# Patient Record
Sex: FEMALE | Race: WHITE | HISPANIC OR LATINO | Employment: UNEMPLOYED | URBAN - METROPOLITAN AREA
[De-identification: names, ages, dates, MRNs, and addresses within clinical notes are randomized per-mention and may not be internally consistent; named-entity substitution may affect disease eponyms.]

---

## 2019-07-17 ENCOUNTER — OFFICE VISIT (OUTPATIENT)
Dept: OBSTETRICS AND GYNECOLOGY | Facility: CLINIC | Age: 19
End: 2019-07-17

## 2019-07-17 VITALS
HEIGHT: 61 IN | WEIGHT: 121.7 LBS | BODY MASS INDEX: 22.98 KG/M2 | SYSTOLIC BLOOD PRESSURE: 100 MMHG | DIASTOLIC BLOOD PRESSURE: 74 MMHG

## 2019-07-17 DIAGNOSIS — Z98.891 PREVIOUS CESAREAN SECTION: ICD-10-CM

## 2019-07-17 DIAGNOSIS — N91.2 ABSENCE OF MENSTRUATION: Primary | ICD-10-CM

## 2019-07-17 DIAGNOSIS — Z78.9 USES SPANISH AS PRIMARY SPOKEN LANGUAGE: ICD-10-CM

## 2019-07-17 LAB
B-HCG UR QL: POSITIVE
INTERNAL NEGATIVE CONTROL: NEGATIVE
INTERNAL POSITIVE CONTROL: POSITIVE
Lab: ABNORMAL

## 2019-07-17 PROCEDURE — 99204 OFFICE O/P NEW MOD 45 MIN: CPT | Performed by: OBSTETRICS & GYNECOLOGY

## 2019-07-17 PROCEDURE — 81025 URINE PREGNANCY TEST: CPT | Performed by: OBSTETRICS & GYNECOLOGY

## 2019-07-17 RX ORDER — CEPHALEXIN 500 MG/1
CAPSULE ORAL
Refills: 0 | COMMUNITY
Start: 2019-07-04 | End: 2019-08-28

## 2019-07-17 RX ORDER — ONDANSETRON 4 MG/1
4 TABLET, FILM COATED ORAL DAILY PRN
Qty: 30 TABLET | Refills: 1 | Status: SHIPPED | OUTPATIENT
Start: 2019-07-17 | End: 2020-02-07 | Stop reason: HOSPADM

## 2019-07-17 NOTE — PROGRESS NOTES
OB CONFIRMATION APPOINTMENT    CC- Pt has had a menstrual irregularity    Chief Complaint   Patient presents with   • Amenorrhea        HISTORY OF PRESENT ILLNESS:    Amenorrhea   This is a new problem. The current episode started more than 1 month ago. The problem occurs constantly. The problem has been unchanged. Associated symptoms include nausea. Pertinent negatives include no abdominal pain or fever. Nothing aggravates the symptoms. She has tried nothing for the symptoms.       Angela Clemente is being seen today to confirm she is pregnant.    She is a 19 y.o.  Patient's last menstrual period was 2019 (exact date)..  EDC= 20.  Gestational age is 11wks 1d     Current obstetric complaints : none     Prior obstetric issues, potential pregnancy concerns: previous IOL @ 33 weeks for severe preeclampsia.    Family history of genetic issues (includes FOB): none    OFFERED GENETIC TESTING:   CfDNA: yes  Cystic fibrosis: yes  Spinal muscular atrophy: yes  Fragile X syndrome: yes    Prior infections concerning in pregnancy (Rash, fever in last 2 weeks): none    Varicella or vaccine Hx -unknown    Prior testing for Cystic Fibrosis Carrier or Sickle Cell Trait- unknown    HISTORY REVIEWED:      History reviewed. No pertinent past medical history.    History reviewed. No pertinent surgical history.      Current Outpatient Medications:   •  cephalexin (KEFLEX) 500 MG capsule, TAKE ONE CAPSULE BY MOUTH EVERY 12 HOURS FOR 7 DAYS, Disp: , Rfl: 0  •  ondansetron (ZOFRAN) 4 MG tablet, Take 1 tablet by mouth Daily As Needed for Nausea or Vomiting., Disp: 30 tablet, Rfl: 1    No Known Allergies    Social History     Socioeconomic History   • Marital status:      Spouse name: Not on file   • Number of children: Not on file   • Years of education: Not on file   • Highest education level: Not on file       History reviewed. No pertinent family history.    REVIEW OF SYSTEMS:     Review of Systems   Constitutional:  "Negative.  Negative for fever.   HENT: Negative.    Eyes: Negative.    Respiratory: Negative.    Cardiovascular: Negative.    Gastrointestinal: Positive for nausea. Negative for abdominal pain.   Endocrine: Negative.    Genitourinary: Positive for menstrual problem.   Musculoskeletal: Negative.    Skin: Negative.    Allergic/Immunologic: Negative.    Neurological: Negative.    Hematological: Negative.    Psychiatric/Behavioral: Negative.        Physical Exam     Physical Exam   Constitutional: She is oriented to person, place, and time. She appears well-developed and well-nourished.   HENT:   Head: Normocephalic and atraumatic.   Eyes: EOM are normal.   Neck: Normal range of motion.   Pulmonary/Chest: Effort normal.   Abdominal: Soft. Bowel sounds are normal. She exhibits no distension and no mass. There is no tenderness. There is no rebound and no guarding.   Musculoskeletal: Normal range of motion.   Neurological: She is alert and oriented to person, place, and time.   Skin: Skin is warm and dry.   Psychiatric: She has a normal mood and affect. Her behavior is normal. Judgment and thought content normal.   Nursing note and vitals reviewed.          Objective    /74   Ht 154.9 cm (61\")   Wt 55.2 kg (121 lb 11.2 oz)   LMP 2019 (Exact Date)   Breastfeeding? No   BMI 23.00 kg/m²     Counseling given: Not Answered          Assessment    1) Pregnancy at Unknown   Angela was seen today for amenorrhea.    Diagnoses and all orders for this visit:    Absence of menstruation  -     POC Pregnancy, Urine    Uses Togolese as primary spoken language    Previous  section:  induction of labor for preeclampsia    Other orders  -     ondansetron (ZOFRAN) 4 MG tablet; Take 1 tablet by mouth Daily As Needed for Nausea or Vomiting.         Plan    Patient is on Prenatal vitamins  Problem list reviewed and updated.  Reviewed routine prenatal care with the patient  Dave (travel restrictions/ok to use insect " repellant), not to changing cat litter, food restrictions, avoidance of alcohol, tobacco and drugs and saunas/hot tubs.   All questions answered.     Counseling was given to patient and family for the following topics: diagnostic results, instructions for management, risk factor reductions, prognosis, patient and family education, impressions, risks and benefits of treatment options, importance of treatment compliance and previous C/S  . Total time of the encounter was 45 minutes face to face and 30 minutes was spent counseling.      RTO Return in about 2 weeks (around 7/31/2019) for ob phys w/ pap.    Tomi Cooney MD    7/17/2019  9:58 AM

## 2019-07-31 ENCOUNTER — RESULTS ENCOUNTER (OUTPATIENT)
Dept: OBSTETRICS AND GYNECOLOGY | Facility: CLINIC | Age: 19
End: 2019-07-31

## 2019-07-31 ENCOUNTER — PROCEDURE VISIT (OUTPATIENT)
Dept: OBSTETRICS AND GYNECOLOGY | Facility: CLINIC | Age: 19
End: 2019-07-31

## 2019-07-31 ENCOUNTER — INITIAL PRENATAL (OUTPATIENT)
Dept: OBSTETRICS AND GYNECOLOGY | Facility: CLINIC | Age: 19
End: 2019-07-31

## 2019-07-31 VITALS — WEIGHT: 122 LBS | DIASTOLIC BLOOD PRESSURE: 76 MMHG | SYSTOLIC BLOOD PRESSURE: 120 MMHG | BODY MASS INDEX: 23.05 KG/M2

## 2019-07-31 DIAGNOSIS — Z98.891 PREVIOUS CESAREAN SECTION: ICD-10-CM

## 2019-07-31 DIAGNOSIS — Z36.87 UNSURE OF LMP (LAST MENSTRUAL PERIOD) AS REASON FOR ULTRASOUND SCAN: Primary | ICD-10-CM

## 2019-07-31 DIAGNOSIS — Z36.9 ENCOUNTER FOR ANTENATAL SCREENING, UNSPECIFIED: ICD-10-CM

## 2019-07-31 DIAGNOSIS — O09.32 INITIAL OBSTETRIC VISIT IN SECOND TRIMESTER: Primary | ICD-10-CM

## 2019-07-31 DIAGNOSIS — Z78.9 USES SPANISH AS PRIMARY SPOKEN LANGUAGE: ICD-10-CM

## 2019-07-31 LAB
C TRACH RRNA SPEC DONR QL NAA+PROBE: NEGATIVE
EXTERNAL AMPHETAMINE SCREEN URINE: NEGATIVE
EXTERNAL CYSTIC FIBROSIS: NEGATIVE
N GONORRHOEA DNA SPEC QL NAA+PROBE: NEGATIVE

## 2019-07-31 PROCEDURE — 76801 OB US < 14 WKS SINGLE FETUS: CPT | Performed by: NURSE PRACTITIONER

## 2019-07-31 PROCEDURE — 99214 OFFICE O/P EST MOD 30 MIN: CPT | Performed by: NURSE PRACTITIONER

## 2019-08-05 LAB
BACTERIA UR CULT: NO GROWTH
BACTERIA UR CULT: NORMAL
DRUGS UR: NORMAL

## 2019-08-06 PROBLEM — R87.610 ASCUS WITH POSITIVE HIGH RISK HPV CERVICAL: Status: ACTIVE | Noted: 2019-08-06

## 2019-08-06 PROBLEM — R87.810 ASCUS WITH POSITIVE HIGH RISK HPV CERVICAL: Status: ACTIVE | Noted: 2019-08-06

## 2019-08-06 LAB
C TRACH RRNA CVX QL NAA+PROBE: NEGATIVE
CONV .: ABNORMAL
CYTOLOGIST CVX/VAG CYTO: ABNORMAL
CYTOLOGY CVX/VAG DOC CYTO: ABNORMAL
CYTOLOGY CVX/VAG DOC THIN PREP: ABNORMAL
DX ICD CODE: ABNORMAL
DX ICD CODE: ABNORMAL
HIV 1 & 2 AB SER-IMP: ABNORMAL
HPV I/H RISK 1 DNA CVX QL PROBE+SIG AMP: POSITIVE
N GONORRHOEA RRNA CVX QL NAA+PROBE: NEGATIVE
OTHER STN SPEC: ABNORMAL
PATHOLOGIST CVX/VAG CYTO: ABNORMAL
STAT OF ADQ CVX/VAG CYTO-IMP: ABNORMAL
T VAGINALIS RRNA SPEC QL NAA+PROBE: NEGATIVE

## 2019-08-07 LAB
ABO GROUP BLD: (no result)
BASOPHILS # BLD AUTO: 0 X10E3/UL (ref 0–0.2)
BASOPHILS NFR BLD AUTO: 0 %
BLD GP AB SCN SERPL QL: NEGATIVE
CYSTIC FIBROSIS MUTATION 97: NORMAL
EOSINOPHIL # BLD AUTO: 0.1 X10E3/UL (ref 0–0.4)
EOSINOPHIL NFR BLD AUTO: 1 %
ERYTHROCYTE [DISTWIDTH] IN BLOOD BY AUTOMATED COUNT: 13.6 % (ref 12.3–15.4)
GENE DIS ANL CARRIER INTERP-IMP: NORMAL
HBA1C MFR BLD: 4.6 % (ref 4.8–5.6)
HBV SURFACE AG SERPL QL IA: NEGATIVE
HCT VFR BLD AUTO: 38.9 % (ref 34–46.6)
HCV AB S/CO SERPL IA: <0.1 S/CO RATIO (ref 0–0.9)
HGB A MFR BLD: 97.8 % (ref 96.4–98.8)
HGB A2 MFR BLD COLUMN CHROM: 2.2 % (ref 1.8–3.2)
HGB BLD-MCNC: 13.5 G/DL (ref 11.1–15.9)
HGB C MFR BLD: 0 %
HGB F MFR BLD: 0 % (ref 0–2)
HGB FRACT BLD-IMP: NORMAL
HGB S BLD QL SOLY: NEGATIVE
HGB S MFR BLD: 0 %
HIV 1+2 AB+HIV1 P24 AG SERPL QL IA: NON REACTIVE
IMM GRANULOCYTES # BLD AUTO: 0 X10E3/UL (ref 0–0.1)
IMM GRANULOCYTES NFR BLD AUTO: 0 %
LYMPHOCYTES # BLD AUTO: 1.5 X10E3/UL (ref 0.7–3.1)
LYMPHOCYTES NFR BLD AUTO: 21 %
MCH RBC QN AUTO: 30.7 PG (ref 26.6–33)
MCHC RBC AUTO-ENTMCNC: 34.7 G/DL (ref 31.5–35.7)
MCV RBC AUTO: 88 FL (ref 79–97)
MONOCYTES # BLD AUTO: 0.5 X10E3/UL (ref 0.1–0.9)
MONOCYTES NFR BLD AUTO: 7 %
NEUTROPHILS # BLD AUTO: 4.9 X10E3/UL (ref 1.4–7)
NEUTROPHILS NFR BLD AUTO: 71 %
PLATELET # BLD AUTO: 254 X10E3/UL (ref 150–450)
RBC # BLD AUTO: 4.4 X10E6/UL (ref 3.77–5.28)
RH BLD: POSITIVE
RPR SER QL: NON REACTIVE
RUBV IGG SERPL IA-ACNC: 1.85 INDEX
WBC # BLD AUTO: 7 X10E3/UL (ref 3.4–10.8)

## 2019-08-28 ENCOUNTER — ROUTINE PRENATAL (OUTPATIENT)
Dept: OBSTETRICS AND GYNECOLOGY | Facility: CLINIC | Age: 19
End: 2019-08-28

## 2019-08-28 VITALS — BODY MASS INDEX: 23.24 KG/M2 | DIASTOLIC BLOOD PRESSURE: 70 MMHG | SYSTOLIC BLOOD PRESSURE: 102 MMHG | WEIGHT: 123 LBS

## 2019-08-28 DIAGNOSIS — Z98.891 PREVIOUS CESAREAN SECTION: Primary | ICD-10-CM

## 2019-08-28 DIAGNOSIS — K21.9 GASTROESOPHAGEAL REFLUX DISEASE WITHOUT ESOPHAGITIS: ICD-10-CM

## 2019-08-28 DIAGNOSIS — Z78.9 USES SPANISH AS PRIMARY SPOKEN LANGUAGE: ICD-10-CM

## 2019-08-28 PROCEDURE — 99213 OFFICE O/P EST LOW 20 MIN: CPT | Performed by: OBSTETRICS & GYNECOLOGY

## 2019-08-28 RX ORDER — RANITIDINE 150 MG/1
150 CAPSULE ORAL 2 TIMES DAILY
Qty: 60 CAPSULE | Refills: 3 | Status: SHIPPED | OUTPATIENT
Start: 2019-08-28 | End: 2019-11-11

## 2019-08-28 NOTE — PROGRESS NOTES
OB follow up     Angela Clemente is a 19 y.o.  17w1d being seen today for her obstetrical visit.  Patient reports no bleeding, no contractions and no leaking. Fetal movement: normal.  Patient complains of new onset GERD.  Occurs daily.    Review of Systems  No bleeding, No cramping/contractions     /70   Wt 55.8 kg (123 lb)   LMP 2019 (Exact Date)   BMI 23.24 kg/m²     FHT: present BPM   Uterine Size: 17 cm       Assessment/Plan:    1) 19 y.o.  -pregnancy at 17w1d    2)   Encounter Diagnoses   Name Primary?   • Previous  section:  induction of labor for preeclampsia Yes   • Gastroesophageal reflux disease without esophagitis    • Uses Citizen of the [place] as primary spoken language    New prescription for Zantac sent in.  Plan 3-week ultrasound for anatomy and OB follow-up.    3) Reviewed this stage of pregnancy  4) Problem list updated     Return in about 3 weeks (around 2019) for US, Anatomy, OB Tummy.      George Hanson MD    2019  9:25 AM

## 2019-09-16 ENCOUNTER — ROUTINE PRENATAL (OUTPATIENT)
Dept: OBSTETRICS AND GYNECOLOGY | Facility: CLINIC | Age: 19
End: 2019-09-16

## 2019-09-16 ENCOUNTER — PROCEDURE VISIT (OUTPATIENT)
Dept: OBSTETRICS AND GYNECOLOGY | Facility: CLINIC | Age: 19
End: 2019-09-16

## 2019-09-16 VITALS — SYSTOLIC BLOOD PRESSURE: 102 MMHG | DIASTOLIC BLOOD PRESSURE: 68 MMHG | BODY MASS INDEX: 23.62 KG/M2 | WEIGHT: 125 LBS

## 2019-09-16 DIAGNOSIS — Z36.89 ENCOUNTER FOR FETAL ANATOMIC SURVEY: Primary | ICD-10-CM

## 2019-09-16 DIAGNOSIS — Z23 NEEDS FLU SHOT: ICD-10-CM

## 2019-09-16 DIAGNOSIS — Z34.90 PREGNANCY, UNSPECIFIED GESTATIONAL AGE: ICD-10-CM

## 2019-09-16 DIAGNOSIS — Z98.891 PREVIOUS CESAREAN SECTION: ICD-10-CM

## 2019-09-16 DIAGNOSIS — Z86.69 HISTORY OF MIGRAINE: ICD-10-CM

## 2019-09-16 DIAGNOSIS — Z36.9 ENCOUNTER FOR ANTENATAL SCREENING, UNSPECIFIED: Primary | ICD-10-CM

## 2019-09-16 DIAGNOSIS — O09.219 PREVIOUS PRETERM DELIVERY, ANTEPARTUM: ICD-10-CM

## 2019-09-16 DIAGNOSIS — Z78.9 USES SPANISH AS PRIMARY SPOKEN LANGUAGE: ICD-10-CM

## 2019-09-16 DIAGNOSIS — R87.610 ASCUS WITH POSITIVE HIGH RISK HPV CERVICAL: ICD-10-CM

## 2019-09-16 DIAGNOSIS — R87.810 ASCUS WITH POSITIVE HIGH RISK HPV CERVICAL: ICD-10-CM

## 2019-09-16 DIAGNOSIS — O09.299 HX OF PREECLAMPSIA, PRIOR PREGNANCY, CURRENTLY PREGNANT: ICD-10-CM

## 2019-09-16 PROCEDURE — 76805 OB US >/= 14 WKS SNGL FETUS: CPT | Performed by: OBSTETRICS & GYNECOLOGY

## 2019-09-16 PROCEDURE — 90471 IMMUNIZATION ADMIN: CPT | Performed by: OBSTETRICS & GYNECOLOGY

## 2019-09-16 PROCEDURE — 90686 IIV4 VACC NO PRSV 0.5 ML IM: CPT | Performed by: OBSTETRICS & GYNECOLOGY

## 2019-09-16 PROCEDURE — 99214 OFFICE O/P EST MOD 30 MIN: CPT | Performed by: OBSTETRICS & GYNECOLOGY

## 2019-09-16 NOTE — PROGRESS NOTES
OB follow up     Angela Clemente is a 19 y.o.  19w6d being seen today for her obstetrical visit.  Patient reports headache. Fetal movement: normal. She has a h/o migraines and is taking tylenol. We also discussed magnesium supplements.     Review of Systems  No bleeding, No cramping/contractions     /68   Wt 56.7 kg (125 lb)   LMP 2019 (Exact Date)   BMI 23.62 kg/m²     FHT: 146 BPM   Uterine Size: 20  cm       Assessment/Plan:    1) 19 y.o.  -pregnancy at 19w6d- normal anatomy scan within the limits of US. Anterior placenta. Good interval growth since US on 7/3/19.    2) NIPT was not done. Check Quad screen.     3) H/O severe preeclampsia at 33.4 weeks- 78 page medical records reviewed. Check CMP, CBC and baseline 24 hour urine. Not taking ASA 81mg, importance of use to prevent preeclampsia d/w pt and Rx called in.     4) Previous C/S at 33.4 weeks- IOL for severe preeclampsia and failure to progress. Plan RLTCS at 39 weeks. Last delivery was 2018.     5) ASCUS + HPV pap- pt is 18 yo, repeat pap pp    6)Patient advised to have flu vaccination to help prevent serious flu related complications for her and her unborn child.  The importance of antibodies for  immunity also discussed with patient and she does agree to vaccination today. All household contacts were advised to be vaccinated if they are of age. The patient was also encouraged to call for Tamiflu for positive exposures.     7)HA- nl BP. D/w pt reasons to come in for HA ( visual changes, prolonged HA, weakness, etc). Try Mag supplements.     8)Problem list updated     9) RTO 4 weeks OBT.       Enriqueta Ybarra MD    2019  9:19 AM

## 2019-09-17 ENCOUNTER — LAB (OUTPATIENT)
Dept: OBSTETRICS AND GYNECOLOGY | Facility: CLINIC | Age: 19
End: 2019-09-17

## 2019-09-17 DIAGNOSIS — O09.299 HX OF PREECLAMPSIA, PRIOR PREGNANCY, CURRENTLY PREGNANT: Primary | ICD-10-CM

## 2019-09-17 LAB
PROT 24H UR-MRATE: 156 MG/24HOURS (ref 0–150)
PROT UR-MCNC: 13 MG/DL

## 2019-09-18 LAB
2ND TRIMESTER 4 SCREEN SERPL-IMP: NORMAL
2ND TRIMESTER 4 SCREEN SERPL-IMP: NORMAL
AFP ADJ MOM SERPL: 1.11
AFP SERPL-MCNC: 68.6 NG/ML
AGE AT DELIVERY: 21.6 YR
ALBUMIN SERPL-MCNC: 4.1 G/DL (ref 3.5–5.2)
ALBUMIN/GLOB SERPL: 1.7 G/DL
ALP SERPL-CCNC: 72 U/L (ref 39–117)
ALT SERPL-CCNC: 9 U/L (ref 1–33)
AST SERPL-CCNC: 9 U/L (ref 1–32)
BASOPHILS # BLD AUTO: 0.02 10*3/MM3 (ref 0–0.2)
BASOPHILS NFR BLD AUTO: 0.3 % (ref 0–1.5)
BILIRUB SERPL-MCNC: 0.2 MG/DL (ref 0.2–1.2)
BUN SERPL-MCNC: 7 MG/DL (ref 6–20)
BUN/CREAT SERPL: 17.5 (ref 7–25)
CALCIUM SERPL-MCNC: 11.3 MG/DL (ref 8.6–10.5)
CHLORIDE SERPL-SCNC: 102 MMOL/L (ref 98–107)
CO2 SERPL-SCNC: 25.7 MMOL/L (ref 22–29)
CREAT SERPL-MCNC: 0.4 MG/DL (ref 0.57–1)
EOSINOPHIL # BLD AUTO: 0.06 10*3/MM3 (ref 0–0.4)
EOSINOPHIL NFR BLD AUTO: 1 % (ref 0.3–6.2)
ERYTHROCYTE [DISTWIDTH] IN BLOOD BY AUTOMATED COUNT: 12.9 % (ref 12.3–15.4)
FET TS 18 RISK FROM MAT AGE: NORMAL
FET TS 21 RISK FROM MAT AGE: 1137
GA METHOD: NORMAL
GA: 19.9 WEEKS
GLOBULIN SER CALC-MCNC: 2.4 GM/DL
GLUCOSE SERPL-MCNC: 76 MG/DL (ref 65–99)
HCG ADJ MOM SERPL: 1.52
HCG SERPL-ACNC: NORMAL MIU/ML
HCT VFR BLD AUTO: 40.3 % (ref 34–46.6)
HGB BLD-MCNC: 13.3 G/DL (ref 12–15.9)
IDDM PATIENT QL: NO
IMM GRANULOCYTES # BLD AUTO: 0.02 10*3/MM3 (ref 0–0.05)
IMM GRANULOCYTES NFR BLD AUTO: 0.3 % (ref 0–0.5)
INHIBIN A ADJ MOM SERPL: 0.98
INHIBIN A SERPL-MCNC: 219.65 PG/ML
LABORATORY COMMENT REPORT: NORMAL
LYMPHOCYTES # BLD AUTO: 1.27 10*3/MM3 (ref 0.7–3.1)
LYMPHOCYTES NFR BLD AUTO: 22.1 % (ref 19.6–45.3)
MCH RBC QN AUTO: 30.6 PG (ref 26.6–33)
MCHC RBC AUTO-ENTMCNC: 33 G/DL (ref 31.5–35.7)
MCV RBC AUTO: 92.9 FL (ref 79–97)
MONOCYTES # BLD AUTO: 0.4 10*3/MM3 (ref 0.1–0.9)
MONOCYTES NFR BLD AUTO: 7 % (ref 5–12)
MULTIPLE PREGNANCY: NO
NEURAL TUBE DEFECT RISK FETUS: 8647 %
NEUTROPHILS # BLD AUTO: 3.98 10*3/MM3 (ref 1.7–7)
NEUTROPHILS NFR BLD AUTO: 69.3 % (ref 42.7–76)
NRBC BLD AUTO-RTO: 0 /100 WBC (ref 0–0.2)
PLATELET # BLD AUTO: 223 10*3/MM3 (ref 140–450)
POTASSIUM SERPL-SCNC: 4.9 MMOL/L (ref 3.5–5.2)
PROT SERPL-MCNC: 6.5 G/DL (ref 6–8.5)
RBC # BLD AUTO: 4.34 10*6/MM3 (ref 3.77–5.28)
RESULT: NORMAL
SODIUM SERPL-SCNC: 138 MMOL/L (ref 136–145)
TS 18 RISK FETUS: NORMAL
TS 21 RISK FETUS: NORMAL
U ESTRIOL ADJ MOM SERPL: 1.36
U ESTRIOL SERPL-MCNC: 2.8 NG/ML
WBC # BLD AUTO: 5.75 10*3/MM3 (ref 3.4–10.8)

## 2019-09-18 NOTE — PROGRESS NOTES
PIP= baseline 24 hour urine is normal at 156 mg. Can we please ask the lab to list the total volume of each 24 hour urine so we know if it is an adequate specimen? thanks

## 2019-10-14 ENCOUNTER — ROUTINE PRENATAL (OUTPATIENT)
Dept: OBSTETRICS AND GYNECOLOGY | Facility: CLINIC | Age: 19
End: 2019-10-14

## 2019-10-14 VITALS — BODY MASS INDEX: 25.13 KG/M2 | WEIGHT: 133 LBS | DIASTOLIC BLOOD PRESSURE: 60 MMHG | SYSTOLIC BLOOD PRESSURE: 104 MMHG

## 2019-10-14 DIAGNOSIS — O23.42 URINARY TRACT INFECTION IN MOTHER DURING SECOND TRIMESTER OF PREGNANCY: ICD-10-CM

## 2019-10-14 DIAGNOSIS — O23.40 URINARY TRACT INFECTION IN MOTHER DURING PREGNANCY, ANTEPARTUM: Primary | ICD-10-CM

## 2019-10-14 PROBLEM — O09.32 INITIAL OBSTETRIC VISIT IN SECOND TRIMESTER: Status: RESOLVED | Noted: 2019-07-31 | Resolved: 2019-10-14

## 2019-10-14 PROBLEM — Z98.891 PREVIOUS CESAREAN SECTION: Status: RESOLVED | Noted: 2019-07-17 | Resolved: 2019-10-14

## 2019-10-14 PROCEDURE — 99214 OFFICE O/P EST MOD 30 MIN: CPT | Performed by: OBSTETRICS & GYNECOLOGY

## 2019-10-14 RX ORDER — NITROFURANTOIN 25; 75 MG/1; MG/1
100 CAPSULE ORAL 2 TIMES DAILY
Qty: 14 CAPSULE | Refills: 0 | Status: SHIPPED | OUTPATIENT
Start: 2019-10-14 | End: 2019-11-11

## 2019-10-14 RX ORDER — GUAIFENESIN, PSEUDOEPHEDRINE HYDROCHLORIDE 600; 60 MG/1; MG/1
1 TABLET, EXTENDED RELEASE ORAL EVERY 12 HOURS
Qty: 14 TABLET | Refills: 0 | Status: SHIPPED | OUTPATIENT
Start: 2019-10-14 | End: 2020-02-07 | Stop reason: HOSPADM

## 2019-10-14 NOTE — PROGRESS NOTES
OB follow up     Chief Complaint: PNC FU    Angela Clemente is a 19 y.o.  23w6d being seen today for her obstetrical visit.  Patient reports cough, HA, congestion.. Fetal movement: normal. This is my first time seeing this pt so all problems are new to me. Pt reports she has started her ASA daily. She has turned in her 24hr urine for baseline. Pt has nirtites on UA. Reports frequency. No dysuria.    Review of Systems  No bleeding, No cramping/contractions     /60   Wt 60.3 kg (133 lb)   LMP 2019 (Exact Date)   BMI 25.13 kg/m²     FHT: present   Uterine Size: 24cm       Assessment/Plan: Low risk pregnancy    1) pregnancy at 23w6d: 2hr GTT next visit    2) Quad screen neg     3) H/O severe preeclampsia at 33.4 weeks. Baseline 24hr urine 156mg protein. Pt is taking ASA daily     4) Previous C/S at 33.4 weeks- IOL for severe preeclampsia and failure to progress. Plan RLTCS at 39 weeks. Last delivery was 2018.      5) ASCUS + HPV pap- pt is 18 yo, repeat pap pp     6) s/p flu vaccine. Needs tdap next vsit    7) Congestion, cough, HA: Rx Mucinex D    8) UTI: Rx Macrobid. Check UCx.            Reviewed this stage of pregnancy  Problem list updated   No Follow-up on file.      Riana Murray DO    10/14/2019  9:10 AM

## 2019-10-16 LAB
BACTERIA UR CULT: NORMAL
BACTERIA UR CULT: NORMAL

## 2019-11-11 ENCOUNTER — ROUTINE PRENATAL (OUTPATIENT)
Dept: OBSTETRICS AND GYNECOLOGY | Facility: CLINIC | Age: 19
End: 2019-11-11

## 2019-11-11 VITALS — WEIGHT: 137 LBS | BODY MASS INDEX: 25.89 KG/M2 | SYSTOLIC BLOOD PRESSURE: 102 MMHG | DIASTOLIC BLOOD PRESSURE: 60 MMHG

## 2019-11-11 DIAGNOSIS — Z3A.27 27 WEEKS GESTATION OF PREGNANCY: Primary | ICD-10-CM

## 2019-11-11 DIAGNOSIS — Z36.9 ENCOUNTER FOR ANTENATAL SCREENING, UNSPECIFIED: ICD-10-CM

## 2019-11-11 DIAGNOSIS — O09.219 PREVIOUS PRETERM DELIVERY, ANTEPARTUM: ICD-10-CM

## 2019-11-11 DIAGNOSIS — Z98.891 HISTORY OF CESAREAN SECTION: ICD-10-CM

## 2019-11-11 LAB
GLUCOSE 1H P 75 G GLC PO SERPL-MCNC: 86 MG/DL (ref 65–179)
GLUCOSE 2H P 75 G GLC PO SERPL-MCNC: 75 MG/DL (ref 65–154)
GLUCOSE P FAST SERPL-MCNC: 64 MG/DL (ref 65–94)
HCT VFR BLD AUTO: 31.6 % (ref 34–46.6)
HGB BLD-MCNC: 10.7 G/DL (ref 12–15.9)

## 2019-11-11 PROCEDURE — 90715 TDAP VACCINE 7 YRS/> IM: CPT | Performed by: OBSTETRICS & GYNECOLOGY

## 2019-11-11 PROCEDURE — 99214 OFFICE O/P EST MOD 30 MIN: CPT | Performed by: OBSTETRICS & GYNECOLOGY

## 2019-11-11 PROCEDURE — 90471 IMMUNIZATION ADMIN: CPT | Performed by: OBSTETRICS & GYNECOLOGY

## 2019-11-11 NOTE — PROGRESS NOTES
OB follow up     Chief Complaint: C FU    Angela Clemente is a 19 y.o.  27w6d being seen today for her obstetrical visit.  Patient reports no complaints. Fetal movement: normal.  This is the first time I am seeing this patient in this pregnancy.  Patient reporting today that she may want to consider doing a .  She had a history of a prior  section at 34 weeks for severe preeclampsia.  No operative note is seen in media.  Her last  section was in 2018.  Patient has a keloid from her prior  section.    Review of Systems  No bleeding, No cramping/contractions     /60   Wt 62.1 kg (137 lb)   LMP 2019 (Exact Date)   BMI 25.89 kg/m²     FHT: present   Uterine Size: 30cm       Assessment/Plan: Low risk pregnancy    1) pregnancy at 27w6d: 2hr GTT today. Rh positive    2) Quad screen neg     3) H/O severe preeclampsia at 33.4 weeks. Baseline 24hr urine 156mg protein. Pt is taking ASA daily     4) Previous C/S at 33.4 weeks- IOL for severe preeclampsia and failure to progress. Plan RLTCS at 39 weeks. Last delivery was 2018. Pt asking today about possible .  Records in epic reviewed but no operative report seen.  Unsure if patient is even a candidate for .  Will request operative report and discussed with the patient at the next visit.  Patient has a keloid from her prior  section is apprehensive about getting another  section due to the keloid.     5) ASCUS + HPV pap- pt is 20 yo, repeat pap pp      6) s/p flu vaccine. tDap today.    7) Size greater than dates: check US at next visit.                Reviewed this stage of pregnancy  Problem list updated   No Follow-up on file.      Riana Murray DO    2019  9:17 AM

## 2019-11-13 ENCOUNTER — TELEPHONE (OUTPATIENT)
Dept: OBSTETRICS AND GYNECOLOGY | Facility: CLINIC | Age: 19
End: 2019-11-13

## 2019-11-13 PROBLEM — O99.019 ANTEPARTUM ANEMIA: Status: ACTIVE | Noted: 2019-11-13

## 2019-11-13 RX ORDER — FERROUS SULFATE 325(65) MG
325 TABLET ORAL
Qty: 90 TABLET | Refills: 1 | Status: SHIPPED | OUTPATIENT
Start: 2019-11-13 | End: 2019-12-27 | Stop reason: SDUPTHER

## 2019-11-13 NOTE — TELEPHONE ENCOUNTER
Received a fax from pharmacy patients ranitidine is on back order. Can you please send something else to pharmacy

## 2019-11-25 ENCOUNTER — PROCEDURE VISIT (OUTPATIENT)
Dept: OBSTETRICS AND GYNECOLOGY | Facility: CLINIC | Age: 19
End: 2019-11-25

## 2019-11-25 ENCOUNTER — ROUTINE PRENATAL (OUTPATIENT)
Dept: OBSTETRICS AND GYNECOLOGY | Facility: CLINIC | Age: 19
End: 2019-11-25

## 2019-11-25 VITALS — SYSTOLIC BLOOD PRESSURE: 104 MMHG | BODY MASS INDEX: 26.06 KG/M2 | DIASTOLIC BLOOD PRESSURE: 64 MMHG | WEIGHT: 137.9 LBS

## 2019-11-25 DIAGNOSIS — Z3A.30 30 WEEKS GESTATION OF PREGNANCY: Primary | ICD-10-CM

## 2019-11-25 DIAGNOSIS — O09.299 HX OF PREECLAMPSIA, PRIOR PREGNANCY, CURRENTLY PREGNANT: ICD-10-CM

## 2019-11-25 DIAGNOSIS — O26.849 UTERINE SIZE DATE DISCREPANCY PREGNANCY: Primary | ICD-10-CM

## 2019-11-25 DIAGNOSIS — O09.219 PREVIOUS PRETERM DELIVERY, ANTEPARTUM: ICD-10-CM

## 2019-11-25 PROCEDURE — 99213 OFFICE O/P EST LOW 20 MIN: CPT | Performed by: OBSTETRICS & GYNECOLOGY

## 2019-11-25 PROCEDURE — 76816 OB US FOLLOW-UP PER FETUS: CPT | Performed by: OBSTETRICS & GYNECOLOGY

## 2019-11-25 RX ORDER — PNV NO.95/FERROUS FUM/FOLIC AC 28MG-0.8MG
TABLET ORAL
COMMUNITY
End: 2020-02-24

## 2019-11-25 NOTE — PROGRESS NOTES
OB follow up     Chief Complaint: C FU    Angela Clemente is a 19 y.o.  29w6d being seen today for her obstetrical visit.  Patient reports no complaints. Fetal movement: normal. Pt asking again about  today.    Review of Systems  No bleeding, No cramping/contractions     /64   Wt 62.6 kg (137 lb 14.4 oz)   LMP 2019 (Exact Date)   BMI 26.06 kg/m²     FHT: present   Uterine Size:           Assessment/Plan: Low risk pregnancy    1) pregnancy at 29w6d: 2hr GTT normal.    2) Anemia of pregnancy: on iron daily. Check H/H at 36 weeks.    3) ) H/O severe preeclampsia at 33.4 weeks. Baseline 24hr urine 156mg protein. Pt is taking ASA daily     4) Previous C/S at 33.4 weeks- IOL for severe preeclampsia and failure to progress. Plan RLTCS at 39 weeks. Last delivery was 2018. Pt asking about possible .  Records in epic reviewed but no operative report seen.  Unsure if patient is even a candidate for . Requested operative report but have not gotten it yet. Will request operative report again.     5) ASCUS + HPV pap- pt is 18 yo, repeat pap pp      6) s/p flu vaccine. s/p tdap.     7) Size greater than Dates: US today reveals EFW 66% and SILVANO 15cm          Reviewed this stage of pregnancy  Problem list updated   No Follow-up on file.      Riana Murray DO    2019  1:03 PM

## 2019-12-09 ENCOUNTER — HOSPITAL ENCOUNTER (OUTPATIENT)
Facility: HOSPITAL | Age: 19
Discharge: HOME | End: 2019-12-09
Attending: OBSTETRICS & GYNECOLOGY | Admitting: OBSTETRICS & GYNECOLOGY

## 2019-12-09 VITALS
RESPIRATION RATE: 16 BRPM | TEMPERATURE: 98.3 F | HEART RATE: 60 BPM | DIASTOLIC BLOOD PRESSURE: 84 MMHG | SYSTOLIC BLOOD PRESSURE: 126 MMHG

## 2019-12-09 LAB
AMPHET+METHAMPHET UR QL: NEGATIVE
AMPHETAMINES UR QL: NEGATIVE
BARBITURATES UR QL SCN: NEGATIVE
BENZODIAZ UR QL SCN: NEGATIVE
BUPRENORPHINE SERPL-MCNC: NEGATIVE NG/ML
CANNABINOIDS SERPL QL: NEGATIVE
COCAINE UR QL: NEGATIVE
METHADONE UR QL SCN: NEGATIVE
OPIATES UR QL: NEGATIVE
OXYCODONE UR QL SCN: NEGATIVE
PCP UR QL SCN: NEGATIVE
PROPOXYPH UR QL: NEGATIVE
TRICYCLICS UR QL SCN: NEGATIVE

## 2019-12-09 PROCEDURE — G0463 HOSPITAL OUTPT CLINIC VISIT: HCPCS

## 2019-12-09 PROCEDURE — 80307 DRUG TEST PRSMV CHEM ANLYZR: CPT | Performed by: OBSTETRICS & GYNECOLOGY

## 2019-12-09 PROCEDURE — 59025 FETAL NON-STRESS TEST: CPT

## 2019-12-09 PROCEDURE — 59025 FETAL NON-STRESS TEST: CPT | Performed by: OBSTETRICS & GYNECOLOGY

## 2019-12-09 RX ORDER — ONDANSETRON 4 MG/1
8 TABLET, FILM COATED ORAL ONCE
Status: COMPLETED | OUTPATIENT
Start: 2019-12-09 | End: 2019-12-09

## 2019-12-09 RX ORDER — FAMOTIDINE 20 MG/1
TABLET, FILM COATED ORAL
Status: COMPLETED
Start: 2019-12-09 | End: 2019-12-09

## 2019-12-09 RX ORDER — FAMOTIDINE 20 MG/1
20 TABLET, FILM COATED ORAL 2 TIMES DAILY
COMMUNITY
End: 2020-02-07 | Stop reason: HOSPADM

## 2019-12-09 RX ORDER — ONDANSETRON 4 MG/1
TABLET, FILM COATED ORAL
Status: COMPLETED
Start: 2019-12-09 | End: 2019-12-09

## 2019-12-09 RX ORDER — FAMOTIDINE 20 MG/1
20 TABLET, FILM COATED ORAL ONCE
Status: COMPLETED | OUTPATIENT
Start: 2019-12-09 | End: 2019-12-09

## 2019-12-09 RX ADMIN — ONDANSETRON 8 MG: 4 TABLET, FILM COATED ORAL at 20:49

## 2019-12-09 RX ADMIN — ONDANSETRON HYDROCHLORIDE 8 MG: 4 TABLET, FILM COATED ORAL at 20:49

## 2019-12-09 RX ADMIN — FAMOTIDINE 20 MG: 20 TABLET ORAL at 20:49

## 2019-12-09 RX ADMIN — FAMOTIDINE 20 MG: 20 TABLET, FILM COATED ORAL at 20:49

## 2019-12-10 NOTE — DISCHARGE INSTRUCTIONS
Keep follow up appointment with [facility] 12/12. Call MD with concerns regarding self and/or fetus such as pain, contractions, vaginal bleeding, decreased/absent fetal movement, and or leaking/rupture of membranes.

## 2019-12-10 NOTE — NURSING NOTE
[id number] Fco used for discharge instructions. Pt and spouse denies questions/concerns. Left unit in no signs of distress denies pain nausea resolved.

## 2019-12-10 NOTE — NON STRESS TEST
Angela Clemente, a  at 31w6d with an CHEYENNE of 2020, by Last Menstrual Period, was seen at [facility] for a nonstress test.    Chief Complaint   Patient presents with   • Abdominal Pain       Patient Active Problem List   Diagnosis   • Uses Yi as primary spoken language   • ASCUS with positive high risk HPV cervical= Repeat pap in 1 year (Due )   • Gastroesophageal reflux disease without esophagitis   • Hx of preeclampsia, prior pregnancy, currently pregnant, on ASA 81 mg, baseline 24 hour urine= 156mg   • History of migraine   • Pregnancy   • Previous  delivery, antepartum- IOL at 33.4 weeks , severe preeclampsia   • Urinary tract infection in mother during second trimester of pregnancy   • History of  section   • Antepartum anemia       Start Time:   Stop Time:     Interpretation A  Nonstress Test Interpretation A: Reactive (19 : Zaria Kenney RN)

## 2019-12-12 ENCOUNTER — ROUTINE PRENATAL (OUTPATIENT)
Dept: OBSTETRICS AND GYNECOLOGY | Facility: CLINIC | Age: 19
End: 2019-12-12

## 2019-12-12 VITALS — SYSTOLIC BLOOD PRESSURE: 110 MMHG | WEIGHT: 138.2 LBS | BODY MASS INDEX: 26.11 KG/M2 | DIASTOLIC BLOOD PRESSURE: 64 MMHG

## 2019-12-12 DIAGNOSIS — Z3A.32 32 WEEKS GESTATION OF PREGNANCY: Primary | ICD-10-CM

## 2019-12-12 DIAGNOSIS — O09.219 PREVIOUS PRETERM DELIVERY, ANTEPARTUM: ICD-10-CM

## 2019-12-12 DIAGNOSIS — O34.219 VBAC (VAGINAL BIRTH AFTER CESAREAN): ICD-10-CM

## 2019-12-12 LAB
GLUCOSE UR STRIP-MCNC: NEGATIVE MG/DL
PROT UR STRIP-MCNC: NEGATIVE MG/DL

## 2019-12-12 PROCEDURE — 99214 OFFICE O/P EST MOD 30 MIN: CPT | Performed by: OBSTETRICS & GYNECOLOGY

## 2019-12-12 NOTE — PROGRESS NOTES
OB follow up     Chief Complaint: PNC FU    Angela Clemente is a 19 y.o.  32w2d being seen today for her obstetrical visit.  Patient reports That she still desires a .  Patient's operative report has been obtained from her prior delivery in [place].. Fetal movement: normal.    Review of Systems  No bleeding, No cramping/contractions     /64   Wt 62.7 kg (138 lb 3.2 oz)   LMP 2019 (Exact Date)   BMI 26.11 kg/m²     FHT: present   Uterine Size: 33cm       Assessment/Plan: Low risk pregnancy    1) pregnancy at 32w2d: 2hr GTT normal.     2) Anemia of pregnancy: on iron daily. Check H/H at 36 weeks.     3) ) H/O severe preeclampsia at 33.4 weeks. Baseline 24hr urine 156mg protein. Pt is taking ASA daily     4) Previous C/S at 33.4 weeks- IOL for severe preeclampsia. Patient was induced and advanced to 6 cm dilated per the patient.  The operative report was obtained and patient had a low transverse  section.  Per the operative note, the decision was made to proceed with the primary  section due to worsening preeclampsia and remote from delivery.  A  was used to review the risks of a  with the patient.  Risk of uterine rupture was reviewed with the patient.  Patient's last delivery was in 2018.  Patient will be 18 months from her last delivery at the time of the  if she makes it to term.  Reviewed with patient that a  will be determined at the end of her pregnancy based on her cervical dilation.  Patient voiced her understanding about the risks and voiced her flexibility in making the decision of a  versus a repeat  at the end of her pregnancy.    5) ASCUS + HPV pap- pt is 20 yo, repeat pap pp      6) s/p flu vaccine. s/p tdap.             Reviewed this stage of pregnancy  Problem list updated   Return in about 2 weeks (around 2019).      Riana Murray DO    2019  11:07 AM

## 2019-12-18 PROBLEM — O34.219 VBAC (VAGINAL BIRTH AFTER CESAREAN): Status: ACTIVE | Noted: 2019-12-18

## 2019-12-27 ENCOUNTER — ROUTINE PRENATAL (OUTPATIENT)
Dept: OBSTETRICS AND GYNECOLOGY | Facility: CLINIC | Age: 19
End: 2019-12-27

## 2019-12-27 VITALS — SYSTOLIC BLOOD PRESSURE: 110 MMHG | BODY MASS INDEX: 26.96 KG/M2 | WEIGHT: 142.7 LBS | DIASTOLIC BLOOD PRESSURE: 62 MMHG

## 2019-12-27 DIAGNOSIS — Z78.9 USES SPANISH AS PRIMARY SPOKEN LANGUAGE: ICD-10-CM

## 2019-12-27 DIAGNOSIS — Z34.90 PREGNANCY, UNSPECIFIED GESTATIONAL AGE: ICD-10-CM

## 2019-12-27 DIAGNOSIS — Z98.891 HISTORY OF CESAREAN SECTION: ICD-10-CM

## 2019-12-27 DIAGNOSIS — O99.019 ANTEPARTUM ANEMIA: Primary | ICD-10-CM

## 2019-12-27 DIAGNOSIS — O09.299 HX OF PREECLAMPSIA, PRIOR PREGNANCY, CURRENTLY PREGNANT: ICD-10-CM

## 2019-12-27 LAB
GLUCOSE UR STRIP-MCNC: NEGATIVE MG/DL
PROT UR STRIP-MCNC: NEGATIVE MG/DL

## 2019-12-27 PROCEDURE — 99213 OFFICE O/P EST LOW 20 MIN: CPT | Performed by: OBSTETRICS & GYNECOLOGY

## 2019-12-27 RX ORDER — LORATADINE 10 MG
TABLET ORAL
COMMUNITY
Start: 2019-12-07 | End: 2020-02-07 | Stop reason: HOSPADM

## 2019-12-27 RX ORDER — FERROUS SULFATE 325(65) MG
325 TABLET ORAL
Qty: 90 TABLET | Refills: 1 | Status: SHIPPED | OUTPATIENT
Start: 2019-12-27 | End: 2020-02-07 | Stop reason: HOSPADM

## 2019-12-27 NOTE — PROGRESS NOTES
OB follow up     Angela Clemente is a 19 y.o.  34w3d being seen today for her obstetrical visit.  Patient reports no complaints. Fetal movement: normal.    Review of Systems  No bleeding, No cramping/contractions     /62   Wt 64.7 kg (142 lb 11.2 oz)   LMP 2019 (Exact Date)   BMI 26.96 kg/m²     FHT: 145 BPM   Uterine Size: 34  cm       Assessment/Plan:    1) 19 y.o.  -pregnancy at 34w3d    2) Anemia of pregnancy: not taking iron daily. Rx called in. Check H/H in 2 weeks     3) ) H/O severe preeclampsia at 33.4 weeks. Baseline 24hr urine 156mg protein. Pt is taking ASA daily     4) Previous C/S at 33.4 weeks- IOL for severe preeclampsia. Patient was induced and advanced to 6 cm dilated per the patient.  The operative report was obtained and patient had a low transverse  section.  Per the operative note, the decision was made to proceed with the primary  section due to worsening preeclampsia and remote from delivery.  A  was used to review the risks of a  with the patient.  Risk of uterine rupture was reviewed with the patient.  Patient's last delivery was in 2018.  Patient will be 18 months from her last delivery at the time of the  if she makes it to term.  Reviewed with patient that a  will be determined at the end of her pregnancy based on her cervical dilation.  Patient voiced her understanding about the risks and voiced her flexibility in making the decision of a  versus a repeat  at the end of her pregnancy.     5) ASCUS + HPV pap- pt is 18 yo, repeat pap pp      6) s/p flu vaccine. s/p tdap.     7)Reviewed this stage of pregnancy    8)Problem list updated     9) RTO 2 weeks OB int, GBS and H&H. Schedule GB at 37 weeks      Enriqueta Ybarra MD    2019  10:16 AM

## 2020-01-11 ENCOUNTER — HOSPITAL ENCOUNTER (OUTPATIENT)
Facility: HOSPITAL | Age: 20
Discharge: HOME | End: 2020-01-11
Attending: OBSTETRICS & GYNECOLOGY | Admitting: OBSTETRICS & GYNECOLOGY

## 2020-01-11 VITALS
BODY MASS INDEX: 26.81 KG/M2 | WEIGHT: 142 LBS | SYSTOLIC BLOOD PRESSURE: 121 MMHG | DIASTOLIC BLOOD PRESSURE: 83 MMHG | HEIGHT: 61 IN | RESPIRATION RATE: 18 BRPM | TEMPERATURE: 98.8 F | HEART RATE: 81 BPM

## 2020-01-11 LAB
AMPHET+METHAMPHET UR QL: NEGATIVE
AMPHETAMINES UR QL: NEGATIVE
BACTERIA UR QL AUTO: ABNORMAL /HPF
BARBITURATES UR QL SCN: NEGATIVE
BENZODIAZ UR QL SCN: NEGATIVE
BILIRUB UR QL STRIP: NEGATIVE
BUPRENORPHINE SERPL-MCNC: NEGATIVE NG/ML
CANNABINOIDS SERPL QL: NEGATIVE
CLARITY UR: ABNORMAL
COCAINE UR QL: NEGATIVE
COLOR UR: YELLOW
GLUCOSE UR STRIP-MCNC: NEGATIVE MG/DL
HGB UR QL STRIP.AUTO: ABNORMAL
HYALINE CASTS UR QL AUTO: ABNORMAL /LPF
KETONES UR QL STRIP: NEGATIVE
LEUKOCYTE ESTERASE UR QL STRIP.AUTO: ABNORMAL
METHADONE UR QL SCN: NEGATIVE
NITRITE UR QL STRIP: NEGATIVE
OPIATES UR QL: NEGATIVE
OXYCODONE UR QL SCN: NEGATIVE
PCP UR QL SCN: NEGATIVE
PH UR STRIP.AUTO: 7 [PH] (ref 4.5–8)
PROPOXYPH UR QL: NEGATIVE
PROT UR QL STRIP: NEGATIVE
RBC # UR: ABNORMAL /HPF
REF LAB TEST METHOD: ABNORMAL
SP GR UR STRIP: 1.02 (ref 1–1.03)
SQUAMOUS #/AREA URNS HPF: ABNORMAL /HPF
TRICYCLICS UR QL SCN: NEGATIVE
UROBILINOGEN UR QL STRIP: ABNORMAL
WBC UR QL AUTO: ABNORMAL /HPF

## 2020-01-11 PROCEDURE — 80306 DRUG TEST PRSMV INSTRMNT: CPT | Performed by: OBSTETRICS & GYNECOLOGY

## 2020-01-11 PROCEDURE — 59025 FETAL NON-STRESS TEST: CPT

## 2020-01-11 PROCEDURE — 59025 FETAL NON-STRESS TEST: CPT | Performed by: OBSTETRICS & GYNECOLOGY

## 2020-01-11 PROCEDURE — G0463 HOSPITAL OUTPT CLINIC VISIT: HCPCS

## 2020-01-11 PROCEDURE — 81001 URINALYSIS AUTO W/SCOPE: CPT | Performed by: OBSTETRICS & GYNECOLOGY

## 2020-01-12 NOTE — SIGNIFICANT NOTE
Call to Dr Ybarra, informed of pt history  at 36 4/7 weeks gestation with complaints of occasional lower abdominal cramping, informed of history of c/s, pre-eclampsia with previous pregnancy, pt desire to , cervical status, contraction status, fetal heart rate status, reactive nst, pt denies vaginal leaking or bleeding, abdomen soft non tender to palpation, + fetal kicking and moving, orders recd to discharge home, keep scheduled follow up appointment.

## 2020-01-12 NOTE — NON STRESS TEST
Angela Clemente, a  at 36w4d with an CHEYENNE of 2020, by Last Menstrual Period, was seen at [facility] for a nonstress test.    Chief Complaint   Patient presents with   • Abdominal Pain       Patient Active Problem List   Diagnosis   • Uses Frisian as primary spoken language   • ASCUS with positive high risk HPV cervical= Repeat pap in 1 year (Due )   • Gastroesophageal reflux disease without esophagitis   • Hx of preeclampsia, prior pregnancy, currently pregnant, on ASA 81 mg, baseline 24 hour urine= 156mg   • History of migraine   • Pregnancy   • Previous  delivery, antepartum- IOL at 33.4 weeks , severe preeclampsia   • Urinary tract infection in mother during second trimester of pregnancy   • History of  section   • Antepartum anemia   •  (vaginal birth after )       Start Time:    Stop Time:     Interpretation A  Nonstress Test Interpretation A: Reactive (20 : Raya Triplett RN)

## 2020-01-12 NOTE — SIGNIFICANT NOTE
Discharge instructions given to pt via [facility]  Yoana #[id number], pt verbalized understanding of instructions, discharged via ambulation with family at side, condition stable.

## 2020-01-15 ENCOUNTER — ROUTINE PRENATAL (OUTPATIENT)
Dept: OBSTETRICS AND GYNECOLOGY | Facility: CLINIC | Age: 20
End: 2020-01-15

## 2020-01-15 VITALS — SYSTOLIC BLOOD PRESSURE: 120 MMHG | WEIGHT: 147.9 LBS | BODY MASS INDEX: 27.95 KG/M2 | DIASTOLIC BLOOD PRESSURE: 74 MMHG

## 2020-01-15 DIAGNOSIS — Z36.85 ANTENATAL SCREENING FOR STREPTOCOCCUS B: ICD-10-CM

## 2020-01-15 DIAGNOSIS — O99.019 ANTEPARTUM ANEMIA: ICD-10-CM

## 2020-01-15 DIAGNOSIS — O09.219 PREVIOUS PRETERM DELIVERY, ANTEPARTUM: ICD-10-CM

## 2020-01-15 DIAGNOSIS — O09.299 HX OF PREECLAMPSIA, PRIOR PREGNANCY, CURRENTLY PREGNANT: ICD-10-CM

## 2020-01-15 DIAGNOSIS — Z34.93 PRENATAL CARE IN THIRD TRIMESTER: Primary | ICD-10-CM

## 2020-01-15 LAB
GLUCOSE UR STRIP-MCNC: NEGATIVE MG/DL
PROT UR STRIP-MCNC: NEGATIVE MG/DL

## 2020-01-15 PROCEDURE — 99213 OFFICE O/P EST LOW 20 MIN: CPT | Performed by: NURSE PRACTITIONER

## 2020-01-15 NOTE — PROGRESS NOTES
OB follow up > 20 weeks    Chief Complaint   Patient presents with   • Routine Prenatal Visit     pain in lower abd, having bad headaches       Angela Clemente is a 19 y.o.  37w1d being seen today for her obstetrical visit.  Patient reports she is having occas contractions. [facility] she was 1 cm dilated.  Taking prenatal vitamins: Yes      Review of Systems  Constitutional: neg fatigue  Cardiovascular: neg edema  Gastrointestinal: neg n/v  Genitourinary: Negative for vaginal bleeding, or SROM.  + contractions  Fetal movement: normal    /74   Wt 67.1 kg (147 lb 14.4 oz)   LMP 2019 (Exact Date)   BMI 27.95 kg/m²     FHT: present BPM   Uterine Size: size greater than dates       Assessment    1) pregnancy at 37w1d     2) GBS collected today     3) S>D- Check growth US.     4) Anemia- Taking iron. Check CBC today.     5) H/O severe preeclampsia at 33.4 weeks. Baseline 24hr urine 156mg protein. Pt is taking ASA daily    6) Previous C/S at 33.4 weeks- IOL for severe preeclampsia. Patient was induced and advanced to 6 cm dilated per the patient.  The operative report was obtained and patient had a low transverse  section.  Per the operative note, the decision was made to proceed with the primary  section due to worsening preeclampsia and remote from delivery.  A  was used to review the risks of a  with the patient.  Risk of uterine rupture was reviewed with the patient.  Patient's last delivery was in 2018.  Patient will be 18 months from her last delivery at the time of the  if she makes it to term.  Reviewed with patient that a  will be determined at the end of her pregnancy based on her cervical dilation.  Patient voiced her understanding about the risks and voiced her flexibility in making the decision of a  versus a repeat  at the end of her pregnancy.    7) S/P flu and tdap    8) ASCUS + HPV pap- pt  is 18 yo, repeat pap pp          Plan    Continue prenatal vitamins  Reviewed this stage of pregnancy  Problem list updated   Follow up in 1 weeks    María Smith, SP  1/15/2020  4:56 PM

## 2020-01-16 LAB
BASOPHILS # BLD AUTO: 0.02 10*3/MM3 (ref 0–0.2)
BASOPHILS NFR BLD AUTO: 0.3 % (ref 0–1.5)
EOSINOPHIL # BLD AUTO: 0.04 10*3/MM3 (ref 0–0.4)
EOSINOPHIL NFR BLD AUTO: 0.6 % (ref 0.3–6.2)
ERYTHROCYTE [DISTWIDTH] IN BLOOD BY AUTOMATED COUNT: 12.3 % (ref 12.3–15.4)
HCT VFR BLD AUTO: 36 % (ref 34–46.6)
HGB BLD-MCNC: 12.3 G/DL (ref 12–15.9)
IMM GRANULOCYTES # BLD AUTO: 0.04 10*3/MM3 (ref 0–0.05)
IMM GRANULOCYTES NFR BLD AUTO: 0.6 % (ref 0–0.5)
LYMPHOCYTES # BLD AUTO: 1.48 10*3/MM3 (ref 0.7–3.1)
LYMPHOCYTES NFR BLD AUTO: 22.3 % (ref 19.6–45.3)
MCH RBC QN AUTO: 31.8 PG (ref 26.6–33)
MCHC RBC AUTO-ENTMCNC: 34.2 G/DL (ref 31.5–35.7)
MCV RBC AUTO: 93 FL (ref 79–97)
MONOCYTES # BLD AUTO: 0.49 10*3/MM3 (ref 0.1–0.9)
MONOCYTES NFR BLD AUTO: 7.4 % (ref 5–12)
NEUTROPHILS # BLD AUTO: 4.56 10*3/MM3 (ref 1.7–7)
NEUTROPHILS NFR BLD AUTO: 68.8 % (ref 42.7–76)
NRBC BLD AUTO-RTO: 0 /100 WBC (ref 0–0.2)
PLATELET # BLD AUTO: 138 10*3/MM3 (ref 140–450)
RBC # BLD AUTO: 3.87 10*6/MM3 (ref 3.77–5.28)
WBC # BLD AUTO: 6.63 10*3/MM3 (ref 3.4–10.8)

## 2020-01-19 LAB — B-HEM STREP SPEC QL CULT: NEGATIVE

## 2020-01-23 ENCOUNTER — PROCEDURE VISIT (OUTPATIENT)
Dept: OBSTETRICS AND GYNECOLOGY | Facility: CLINIC | Age: 20
End: 2020-01-23

## 2020-01-23 ENCOUNTER — ROUTINE PRENATAL (OUTPATIENT)
Dept: OBSTETRICS AND GYNECOLOGY | Facility: CLINIC | Age: 20
End: 2020-01-23

## 2020-01-23 VITALS — DIASTOLIC BLOOD PRESSURE: 78 MMHG | SYSTOLIC BLOOD PRESSURE: 120 MMHG | BODY MASS INDEX: 28.29 KG/M2 | WEIGHT: 149.7 LBS

## 2020-01-23 DIAGNOSIS — O99.019 ANTEPARTUM ANEMIA: ICD-10-CM

## 2020-01-23 DIAGNOSIS — Z34.93 PRENATAL CARE IN THIRD TRIMESTER: Primary | ICD-10-CM

## 2020-01-23 DIAGNOSIS — O09.299 HX OF PREECLAMPSIA, PRIOR PREGNANCY, CURRENTLY PREGNANT: ICD-10-CM

## 2020-01-23 DIAGNOSIS — O26.849 UTERINE SIZE DATE DISCREPANCY PREGNANCY: Primary | ICD-10-CM

## 2020-01-23 DIAGNOSIS — O09.219 PREVIOUS PRETERM DELIVERY, ANTEPARTUM: ICD-10-CM

## 2020-01-23 DIAGNOSIS — O34.219 VBAC (VAGINAL BIRTH AFTER CESAREAN): ICD-10-CM

## 2020-01-23 LAB
GLUCOSE UR STRIP-MCNC: NEGATIVE MG/DL
PROT UR STRIP-MCNC: NEGATIVE MG/DL

## 2020-01-23 PROCEDURE — 76816 OB US FOLLOW-UP PER FETUS: CPT | Performed by: OBSTETRICS & GYNECOLOGY

## 2020-01-23 PROCEDURE — 99214 OFFICE O/P EST MOD 30 MIN: CPT | Performed by: OBSTETRICS & GYNECOLOGY

## 2020-01-23 NOTE — PROGRESS NOTES
OB follow up     Chief Complaint: PNC FU    Angela Clemente is a 19 y.o.  38w2d being seen today for her obstetrical visit.  Patient reports occasional contractions. Fetal movement: normal. Pt reporting increased pelvic pressure and contractions. She has decided to proceed with . She has been counseled as to risk in previous visit. Pt had a growth US today revealing EFW 50%.    Review of Systems  No bleeding, No cramping/contractions     /78   Wt 67.9 kg (149 lb 11.2 oz)   LMP 2019 (Exact Date)   BMI 28.29 kg/m²     FHT: present   Uterine Size:           Assessment/Plan: Low risk pregnancy    1) pregnancy at 38w2d: GBBS negative. US today reveals EFW 50% and SILVANO 16cm    2) ASCUS + HPV pap- pt is 18 yo, repeat pap pp     3) s.p flu and tDap vaccine     4) Anemia- Taking iron. Hgb 12.3    5) H/O severe preeclampsia at 33.4 weeks. Baseline 24hr urine 156mg protein. Pt is taking ASA daily     6) Previous C/S at 33.4 weeks- IOL for severe preeclampsia. Patient was induced and advanced to 6 cm dilated per the patient.  The operative report was obtained and patient had a low transverse  section.  Per the operative note, the decision was made to proceed with the primary  section due to worsening preeclampsia and remote from delivery.  A  was used to review the risks of a  with the patient at previous visit. SVE is favorable today at 3cm dilated. Will plan for IOL next week           Reviewed this stage of pregnancy  Problem list updated   No follow-ups on file.      Riana Murray DO    2020  11:36 AM

## 2020-01-26 ENCOUNTER — HOSPITAL ENCOUNTER (OUTPATIENT)
Facility: HOSPITAL | Age: 20
Discharge: HOME | End: 2020-01-27
Attending: OBSTETRICS & GYNECOLOGY | Admitting: OBSTETRICS & GYNECOLOGY

## 2020-01-26 PROCEDURE — G0463 HOSPITAL OUTPT CLINIC VISIT: HCPCS

## 2020-01-26 PROCEDURE — 80306 DRUG TEST PRSMV INSTRMNT: CPT | Performed by: OBSTETRICS & GYNECOLOGY

## 2020-01-27 VITALS
HEART RATE: 65 BPM | TEMPERATURE: 98.2 F | DIASTOLIC BLOOD PRESSURE: 78 MMHG | RESPIRATION RATE: 18 BRPM | SYSTOLIC BLOOD PRESSURE: 125 MMHG

## 2020-01-27 PROCEDURE — 59025 FETAL NON-STRESS TEST: CPT

## 2020-01-27 NOTE — DISCHARGE INSTRUCTIONS
Keep scheduled appointment with Bee BHATTI. Call the office to speak with the on-call doctor any time, day or night about any question, concerns, or if you think you are in labor by dialing [phone], then press 8 when prompted to reach the on-call doctor. Please drink at least 64 ounces of water every day.

## 2020-01-27 NOTE — NON STRESS TEST
Angela Clemente, a  at 38w6d with an CHEYENNE of 2020, by Last Menstrual Period, was seen at [facility] for a nonstress test.    Chief Complaint   Patient presents with   • Contractions     pt states she started feeling ctx around 1100 2020       Patient Active Problem List   Diagnosis   • Uses Rwandan as primary spoken language   • ASCUS with positive high risk HPV cervical= Repeat pap in 1 year (Due )   • Gastroesophageal reflux disease without esophagitis   • Hx of preeclampsia, prior pregnancy, currently pregnant, on ASA 81 mg, baseline 24 hour urine= 156mg   • History of migraine   • Pregnancy   • Previous  delivery, antepartum- IOL at 33.4 weeks , severe preeclampsia   • Urinary tract infection in mother during second trimester of pregnancy   • History of  section   • Antepartum anemia   •  (vaginal birth after )   • Prenatal care in third trimester       Start Time:    Stop Time: 302    Interpretation A  Nonstress Test Interpretation A: Reactive (20 : Raya Triplett RN)

## 2020-01-28 ENCOUNTER — TELEPHONE (OUTPATIENT)
Dept: OBSTETRICS AND GYNECOLOGY | Facility: CLINIC | Age: 20
End: 2020-01-28

## 2020-01-29 ENCOUNTER — ANESTHESIA (OUTPATIENT)
Dept: OBSTETRICS AND GYNECOLOGY | Facility: HOSPITAL | Age: 20
End: 2020-01-29

## 2020-01-29 ENCOUNTER — ANESTHESIA EVENT (OUTPATIENT)
Dept: PERIOP | Facility: HOSPITAL | Age: 20
End: 2020-01-29

## 2020-01-29 ENCOUNTER — HOSPITAL ENCOUNTER (INPATIENT)
Facility: HOSPITAL | Age: 20
LOS: 9 days | Discharge: HOME | End: 2020-02-07
Attending: OBSTETRICS & GYNECOLOGY | Admitting: OBSTETRICS & GYNECOLOGY

## 2020-01-29 ENCOUNTER — PREP FOR SURGERY (OUTPATIENT)
Dept: OTHER | Facility: HOSPITAL | Age: 20
End: 2020-01-29

## 2020-01-29 ENCOUNTER — ANESTHESIA EVENT (OUTPATIENT)
Dept: OBSTETRICS AND GYNECOLOGY | Facility: HOSPITAL | Age: 20
End: 2020-01-29

## 2020-01-29 ENCOUNTER — HOSPITAL ENCOUNTER (INPATIENT)
Dept: LABOR AND DELIVERY | Facility: HOSPITAL | Age: 20
Discharge: HOME | End: 2020-01-29

## 2020-01-29 ENCOUNTER — ANESTHESIA (OUTPATIENT)
Dept: PERIOP | Facility: HOSPITAL | Age: 20
End: 2020-01-29

## 2020-01-29 DIAGNOSIS — Z90.711 S/P ABDOMINAL SUPRACERVICAL SUBTOTAL HYSTERECTOMY: Primary | ICD-10-CM

## 2020-01-29 DIAGNOSIS — T14.8XXA HEMATOMA: ICD-10-CM

## 2020-01-29 PROBLEM — Z34.93 PRENATAL CARE IN THIRD TRIMESTER: Status: RESOLVED | Noted: 2020-01-15 | Resolved: 2020-01-29

## 2020-01-29 PROBLEM — O34.219 DESIRES VBAC (VAGINAL BIRTH AFTER CESAREAN) TRIAL: Status: ACTIVE | Noted: 2020-01-29

## 2020-01-29 LAB
ABO GROUP BLD: NORMAL
ABO GROUP BLD: NORMAL
AMPHET+METHAMPHET UR QL: NEGATIVE
AMPHETAMINES UR QL: NEGATIVE
BARBITURATES UR QL SCN: NEGATIVE
BENZODIAZ UR QL SCN: NEGATIVE
BLD GP AB SCN SERPL QL: NEGATIVE
BUPRENORPHINE SERPL-MCNC: NEGATIVE NG/ML
CANNABINOIDS SERPL QL: NEGATIVE
COCAINE UR QL: NEGATIVE
DEPRECATED RDW RBC AUTO: 44.2 FL (ref 37–54)
DEPRECATED RDW RBC AUTO: 44.3 FL (ref 37–54)
ERYTHROCYTE [DISTWIDTH] IN BLOOD BY AUTOMATED COUNT: 12.7 % (ref 12.3–15.4)
ERYTHROCYTE [DISTWIDTH] IN BLOOD BY AUTOMATED COUNT: 13.4 % (ref 12.3–15.4)
HCT VFR BLD AUTO: 25.6 % (ref 34–46.6)
HCT VFR BLD AUTO: 27.6 % (ref 34–46.6)
HCT VFR BLD AUTO: 36.2 % (ref 34–46.6)
HGB BLD-MCNC: 12.3 G/DL (ref 12–15.9)
HGB BLD-MCNC: 8.9 G/DL (ref 12–15.9)
HGB BLD-MCNC: 9.5 G/DL (ref 12–15.9)
MCH RBC QN AUTO: 31.4 PG (ref 26.6–33)
MCH RBC QN AUTO: 32.2 PG (ref 26.6–33)
MCHC RBC AUTO-ENTMCNC: 34 G/DL (ref 31.5–35.7)
MCHC RBC AUTO-ENTMCNC: 34.8 G/DL (ref 31.5–35.7)
MCV RBC AUTO: 90.5 FL (ref 79–97)
MCV RBC AUTO: 94.8 FL (ref 79–97)
METHADONE UR QL SCN: NEGATIVE
OPIATES UR QL: NEGATIVE
OXYCODONE UR QL SCN: NEGATIVE
PCP UR QL SCN: NEGATIVE
PLATELET # BLD AUTO: 102 10*3/MM3 (ref 140–450)
PLATELET # BLD AUTO: 138 10*3/MM3 (ref 140–450)
PMV BLD AUTO: 12.4 FL (ref 6–12)
PMV BLD AUTO: 12.6 FL (ref 6–12)
PROPOXYPH UR QL: NEGATIVE
RBC # BLD AUTO: 2.83 10*6/MM3 (ref 3.77–5.28)
RBC # BLD AUTO: 3.82 10*6/MM3 (ref 3.77–5.28)
RH BLD: POSITIVE
RH BLD: POSITIVE
T&S EXPIRATION DATE: NORMAL
TRICYCLICS UR QL SCN: NEGATIVE
WBC NRBC COR # BLD: 12.36 10*3/MM3 (ref 3.4–10.8)
WBC NRBC COR # BLD: 6.07 10*3/MM3 (ref 3.4–10.8)

## 2020-01-29 PROCEDURE — 86927 PLASMA FRESH FROZEN: CPT

## 2020-01-29 PROCEDURE — 25010000002 PHENYLEPHRINE PER 1 ML: Performed by: NURSE ANESTHETIST, CERTIFIED REGISTERED

## 2020-01-29 PROCEDURE — 25010000002 KETOROLAC TROMETHAMINE PER 15 MG: Performed by: NURSE ANESTHETIST, CERTIFIED REGISTERED

## 2020-01-29 PROCEDURE — 86923 COMPATIBILITY TEST ELECTRIC: CPT

## 2020-01-29 PROCEDURE — 58180 PARTIAL HYSTERECTOMY: CPT | Performed by: OBSTETRICS & GYNECOLOGY

## 2020-01-29 PROCEDURE — 25010000003 MORPHINE PER 10 MG: Performed by: NURSE ANESTHETIST, CERTIFIED REGISTERED

## 2020-01-29 PROCEDURE — 25010000002 HYDROMORPHONE PER 4 MG: Performed by: NURSE ANESTHETIST, CERTIFIED REGISTERED

## 2020-01-29 PROCEDURE — 0UC97ZZ EXTIRPATION OF MATTER FROM UTERUS, VIA NATURAL OR ARTIFICIAL OPENING: ICD-10-PCS | Performed by: OBSTETRICS & GYNECOLOGY

## 2020-01-29 PROCEDURE — 25010000002 SUCCINYLCHOLINE PER 20 MG: Performed by: NURSE ANESTHETIST, CERTIFIED REGISTERED

## 2020-01-29 PROCEDURE — 86900 BLOOD TYPING SEROLOGIC ABO: CPT

## 2020-01-29 PROCEDURE — S0260 H&P FOR SURGERY: HCPCS | Performed by: OBSTETRICS & GYNECOLOGY

## 2020-01-29 PROCEDURE — 85027 COMPLETE CBC AUTOMATED: CPT | Performed by: OBSTETRICS & GYNECOLOGY

## 2020-01-29 PROCEDURE — 25010000002 FENTANYL CITRATE (PF) 100 MCG/2ML SOLUTION: Performed by: NURSE ANESTHETIST, CERTIFIED REGISTERED

## 2020-01-29 PROCEDURE — C1755 CATHETER, INTRASPINAL: HCPCS | Performed by: NURSE ANESTHETIST, CERTIFIED REGISTERED

## 2020-01-29 PROCEDURE — 88307 TISSUE EXAM BY PATHOLOGIST: CPT | Performed by: OBSTETRICS & GYNECOLOGY

## 2020-01-29 PROCEDURE — 0HB7XZZ EXCISION OF ABDOMEN SKIN, EXTERNAL APPROACH: ICD-10-PCS | Performed by: OBSTETRICS & GYNECOLOGY

## 2020-01-29 PROCEDURE — 25010000002 METHYLERGONOVINE MALEATE PER 0.2 MG

## 2020-01-29 PROCEDURE — 0KQM0ZZ REPAIR PERINEUM MUSCLE, OPEN APPROACH: ICD-10-PCS | Performed by: OBSTETRICS & GYNECOLOGY

## 2020-01-29 PROCEDURE — 25010000002 ONDANSETRON PER 1 MG: Performed by: NURSE ANESTHETIST, CERTIFIED REGISTERED

## 2020-01-29 PROCEDURE — 0UT90ZL RESECTION OF UTERUS, SUPRACERVICAL, OPEN APPROACH: ICD-10-PCS | Performed by: OBSTETRICS & GYNECOLOGY

## 2020-01-29 PROCEDURE — 85014 HEMATOCRIT: CPT | Performed by: OBSTETRICS & GYNECOLOGY

## 2020-01-29 PROCEDURE — 25010000002 NEOSTIGMINE 10 MG/10ML SOLUTION 10 ML VIAL: Performed by: NURSE ANESTHETIST, CERTIFIED REGISTERED

## 2020-01-29 PROCEDURE — 10907ZC DRAINAGE OF AMNIOTIC FLUID, THERAPEUTIC FROM PRODUCTS OF CONCEPTION, VIA NATURAL OR ARTIFICIAL OPENING: ICD-10-PCS | Performed by: OBSTETRICS & GYNECOLOGY

## 2020-01-29 PROCEDURE — 85018 HEMOGLOBIN: CPT | Performed by: OBSTETRICS & GYNECOLOGY

## 2020-01-29 PROCEDURE — 86901 BLOOD TYPING SEROLOGIC RH(D): CPT | Performed by: OBSTETRICS & GYNECOLOGY

## 2020-01-29 PROCEDURE — 3E033VJ INTRODUCTION OF OTHER HORMONE INTO PERIPHERAL VEIN, PERCUTANEOUS APPROACH: ICD-10-PCS | Performed by: OBSTETRICS & GYNECOLOGY

## 2020-01-29 PROCEDURE — P9016 RBC LEUKOCYTES REDUCED: HCPCS

## 2020-01-29 PROCEDURE — 25010000002 PROPOFOL 10 MG/ML EMULSION: Performed by: NURSE ANESTHETIST, CERTIFIED REGISTERED

## 2020-01-29 PROCEDURE — 86850 RBC ANTIBODY SCREEN: CPT | Performed by: OBSTETRICS & GYNECOLOGY

## 2020-01-29 PROCEDURE — 25010000003 CEFAZOLIN SODIUM-DEXTROSE 2-3 GM-%(50ML) RECONSTITUTED SOLUTION: Performed by: NURSE ANESTHETIST, CERTIFIED REGISTERED

## 2020-01-29 PROCEDURE — 36430 TRANSFUSION BLD/BLD COMPNT: CPT

## 2020-01-29 PROCEDURE — P9017 PLASMA 1 DONOR FRZ W/IN 8 HR: HCPCS

## 2020-01-29 PROCEDURE — 86900 BLOOD TYPING SEROLOGIC ABO: CPT | Performed by: OBSTETRICS & GYNECOLOGY

## 2020-01-29 PROCEDURE — 86901 BLOOD TYPING SEROLOGIC RH(D): CPT

## 2020-01-29 PROCEDURE — 80306 DRUG TEST PRSMV INSTRMNT: CPT | Performed by: OBSTETRICS & GYNECOLOGY

## 2020-01-29 RX ORDER — HYDROCODONE BITARTRATE AND ACETAMINOPHEN 7.5; 325 MG/1; MG/1
1 TABLET ORAL EVERY 4 HOURS PRN
Status: DISPENSED | OUTPATIENT
Start: 2020-01-29 | End: 2020-01-30

## 2020-01-29 RX ORDER — MAGNESIUM HYDROXIDE 1200 MG/15ML
LIQUID ORAL AS NEEDED
Status: DISCONTINUED | OUTPATIENT
Start: 2020-01-29 | End: 2020-01-29 | Stop reason: HOSPADM

## 2020-01-29 RX ORDER — OXYTOCIN/0.9 % SODIUM CHLORIDE 30/500 ML
PLASTIC BAG, INJECTION (ML) INTRAVENOUS
Status: COMPLETED
Start: 2020-01-29 | End: 2020-01-29

## 2020-01-29 RX ORDER — MINERAL OIL
OIL (ML) MISCELLANEOUS AS NEEDED
Status: DISCONTINUED | OUTPATIENT
Start: 2020-01-29 | End: 2020-01-29 | Stop reason: HOSPADM

## 2020-01-29 RX ORDER — FAMOTIDINE 20 MG/1
20 TABLET, FILM COATED ORAL 2 TIMES DAILY
Status: DISCONTINUED | OUTPATIENT
Start: 2020-01-29 | End: 2020-02-05

## 2020-01-29 RX ORDER — MISOPROSTOL 200 UG/1
800 TABLET ORAL AS NEEDED
Status: DISCONTINUED | OUTPATIENT
Start: 2020-01-29 | End: 2020-01-29 | Stop reason: HOSPADM

## 2020-01-29 RX ORDER — ONDANSETRON 2 MG/ML
4 INJECTION INTRAMUSCULAR; INTRAVENOUS ONCE AS NEEDED
Status: DISCONTINUED | OUTPATIENT
Start: 2020-01-29 | End: 2020-01-29 | Stop reason: HOSPADM

## 2020-01-29 RX ORDER — LIDOCAINE HYDROCHLORIDE AND EPINEPHRINE 15; 5 MG/ML; UG/ML
INJECTION, SOLUTION EPIDURAL AS NEEDED
Status: DISCONTINUED | OUTPATIENT
Start: 2020-01-29 | End: 2020-01-29 | Stop reason: SURG

## 2020-01-29 RX ORDER — SODIUM CHLORIDE 0.9 % (FLUSH) 0.9 %
10 SYRINGE (ML) INJECTION AS NEEDED
Status: DISCONTINUED | OUTPATIENT
Start: 2020-01-29 | End: 2020-01-29 | Stop reason: HOSPADM

## 2020-01-29 RX ORDER — KETOROLAC TROMETHAMINE 30 MG/ML
30 INJECTION, SOLUTION INTRAMUSCULAR; INTRAVENOUS EVERY 6 HOURS
Status: DISPENSED | OUTPATIENT
Start: 2020-01-30 | End: 2020-01-30

## 2020-01-29 RX ORDER — ROCURONIUM BROMIDE 10 MG/ML
INJECTION, SOLUTION INTRAVENOUS AS NEEDED
Status: DISCONTINUED | OUTPATIENT
Start: 2020-01-29 | End: 2020-01-29 | Stop reason: SURG

## 2020-01-29 RX ORDER — SODIUM CHLORIDE 9 MG/ML
40 INJECTION, SOLUTION INTRAVENOUS AS NEEDED
Status: DISCONTINUED | OUTPATIENT
Start: 2020-01-29 | End: 2020-01-29 | Stop reason: HOSPADM

## 2020-01-29 RX ORDER — ONDANSETRON 2 MG/ML
4 INJECTION INTRAMUSCULAR; INTRAVENOUS ONCE AS NEEDED
Status: DISPENSED | OUTPATIENT
Start: 2020-01-29 | End: 2020-01-30

## 2020-01-29 RX ORDER — SODIUM CHLORIDE, SODIUM LACTATE, POTASSIUM CHLORIDE, CALCIUM CHLORIDE 600; 310; 30; 20 MG/100ML; MG/100ML; MG/100ML; MG/100ML
150 INJECTION, SOLUTION INTRAVENOUS CONTINUOUS
Status: DISCONTINUED | OUTPATIENT
Start: 2020-01-29 | End: 2020-02-02

## 2020-01-29 RX ORDER — SUFENTANIL CITRATE 50 UG/ML
INJECTION EPIDURAL; INTRAVENOUS
Status: DISPENSED
Start: 2020-01-29 | End: 2020-01-29

## 2020-01-29 RX ORDER — GLYCOPYRROLATE 0.2 MG/ML
INJECTION INTRAMUSCULAR; INTRAVENOUS AS NEEDED
Status: DISCONTINUED | OUTPATIENT
Start: 2020-01-29 | End: 2020-01-29 | Stop reason: SURG

## 2020-01-29 RX ORDER — SUCCINYLCHOLINE CHLORIDE 20 MG/ML
INJECTION INTRAMUSCULAR; INTRAVENOUS AS NEEDED
Status: DISCONTINUED | OUTPATIENT
Start: 2020-01-29 | End: 2020-01-29 | Stop reason: SURG

## 2020-01-29 RX ORDER — DIPHENHYDRAMINE HCL 25 MG
25 CAPSULE ORAL EVERY 4 HOURS PRN
Status: DISCONTINUED | OUTPATIENT
Start: 2020-01-29 | End: 2020-02-07 | Stop reason: HOSPADM

## 2020-01-29 RX ORDER — KETOROLAC TROMETHAMINE 30 MG/ML
INJECTION, SOLUTION INTRAMUSCULAR; INTRAVENOUS AS NEEDED
Status: DISCONTINUED | OUTPATIENT
Start: 2020-01-29 | End: 2020-01-29 | Stop reason: SURG

## 2020-01-29 RX ORDER — SODIUM CHLORIDE 0.9 % (FLUSH) 0.9 %
1-10 SYRINGE (ML) INJECTION AS NEEDED
Status: DISCONTINUED | OUTPATIENT
Start: 2020-01-29 | End: 2020-01-29 | Stop reason: HOSPADM

## 2020-01-29 RX ORDER — SIMETHICONE 80 MG
80 TABLET,CHEWABLE ORAL 4 TIMES DAILY PRN
Status: DISCONTINUED | OUTPATIENT
Start: 2020-01-29 | End: 2020-02-07 | Stop reason: HOSPADM

## 2020-01-29 RX ORDER — ONDANSETRON 4 MG/1
4 TABLET, FILM COATED ORAL EVERY 6 HOURS PRN
Status: DISCONTINUED | OUTPATIENT
Start: 2020-01-29 | End: 2020-01-29 | Stop reason: HOSPADM

## 2020-01-29 RX ORDER — METHYLERGONOVINE MALEATE 0.2 MG/ML
INJECTION INTRAVENOUS
Status: COMPLETED
Start: 2020-01-29 | End: 2020-01-29

## 2020-01-29 RX ORDER — CEFAZOLIN SODIUM 2 G/50ML
2 SOLUTION INTRAVENOUS ONCE
Status: CANCELLED | OUTPATIENT
Start: 2020-01-29 | End: 2020-01-29

## 2020-01-29 RX ORDER — NEOSTIGMINE METHYLSULFATE 1 MG/ML
INJECTION, SOLUTION INTRAVENOUS AS NEEDED
Status: DISCONTINUED | OUTPATIENT
Start: 2020-01-29 | End: 2020-01-29 | Stop reason: SURG

## 2020-01-29 RX ORDER — OXYCODONE HYDROCHLORIDE AND ACETAMINOPHEN 5; 325 MG/1; MG/1
1 TABLET ORAL EVERY 4 HOURS PRN
Status: DISPENSED | OUTPATIENT
Start: 2020-01-29 | End: 2020-01-30

## 2020-01-29 RX ORDER — EPHEDRINE SULFATE 50 MG/ML
5 INJECTION, SOLUTION INTRAVENOUS
Status: DISCONTINUED | OUTPATIENT
Start: 2020-01-29 | End: 2020-01-29 | Stop reason: HOSPADM

## 2020-01-29 RX ORDER — SODIUM CHLORIDE 0.9 % (FLUSH) 0.9 %
1-10 SYRINGE (ML) INJECTION AS NEEDED
Status: CANCELLED | OUTPATIENT
Start: 2020-01-29

## 2020-01-29 RX ORDER — SODIUM CHLORIDE, SODIUM LACTATE, POTASSIUM CHLORIDE, CALCIUM CHLORIDE 600; 310; 30; 20 MG/100ML; MG/100ML; MG/100ML; MG/100ML
100 INJECTION, SOLUTION INTRAVENOUS CONTINUOUS
Status: DISCONTINUED | OUTPATIENT
Start: 2020-01-29 | End: 2020-01-29

## 2020-01-29 RX ORDER — HYDROMORPHONE HYDROCHLORIDE 1 MG/ML
INJECTION, SOLUTION INTRAMUSCULAR; INTRAVENOUS; SUBCUTANEOUS AS NEEDED
Status: DISCONTINUED | OUTPATIENT
Start: 2020-01-29 | End: 2020-01-29 | Stop reason: SURG

## 2020-01-29 RX ORDER — FERROUS GLUCONATE 324(37.5)
324 TABLET ORAL
Status: DISCONTINUED | OUTPATIENT
Start: 2020-01-30 | End: 2020-02-07 | Stop reason: HOSPADM

## 2020-01-29 RX ORDER — HYDROMORPHONE HYDROCHLORIDE 1 MG/ML
0.5 INJECTION, SOLUTION INTRAMUSCULAR; INTRAVENOUS; SUBCUTANEOUS
Status: DISPENSED | OUTPATIENT
Start: 2020-01-29 | End: 2020-01-30

## 2020-01-29 RX ORDER — CEFAZOLIN SODIUM 2 G/50ML
SOLUTION INTRAVENOUS AS NEEDED
Status: DISCONTINUED | OUTPATIENT
Start: 2020-01-29 | End: 2020-01-29 | Stop reason: SURG

## 2020-01-29 RX ORDER — FENTANYL CITRATE 50 UG/ML
INJECTION, SOLUTION INTRAMUSCULAR; INTRAVENOUS AS NEEDED
Status: DISCONTINUED | OUTPATIENT
Start: 2020-01-29 | End: 2020-01-29 | Stop reason: SURG

## 2020-01-29 RX ORDER — CARBOPROST TROMETHAMINE 250 UG/ML
250 INJECTION, SOLUTION INTRAMUSCULAR AS NEEDED
Status: DISCONTINUED | OUTPATIENT
Start: 2020-01-29 | End: 2020-01-29 | Stop reason: HOSPADM

## 2020-01-29 RX ORDER — LIDOCAINE HYDROCHLORIDE 20 MG/ML
INJECTION, SOLUTION INFILTRATION; PERINEURAL AS NEEDED
Status: DISCONTINUED | OUTPATIENT
Start: 2020-01-29 | End: 2020-01-29 | Stop reason: SURG

## 2020-01-29 RX ORDER — OXYTOCIN/0.9 % SODIUM CHLORIDE 30/500 ML
650 PLASTIC BAG, INJECTION (ML) INTRAVENOUS ONCE
Status: COMPLETED | OUTPATIENT
Start: 2020-01-29 | End: 2020-01-29

## 2020-01-29 RX ORDER — METHYLERGONOVINE MALEATE 0.2 MG/ML
200 INJECTION INTRAVENOUS ONCE AS NEEDED
Status: DISCONTINUED | OUTPATIENT
Start: 2020-01-29 | End: 2020-01-29 | Stop reason: HOSPADM

## 2020-01-29 RX ORDER — LIDOCAINE HYDROCHLORIDE 10 MG/ML
INJECTION, SOLUTION EPIDURAL; INFILTRATION; INTRACAUDAL; PERINEURAL
Status: COMPLETED
Start: 2020-01-29 | End: 2020-01-29

## 2020-01-29 RX ORDER — DIPHENHYDRAMINE HYDROCHLORIDE 50 MG/ML
25 INJECTION INTRAMUSCULAR; INTRAVENOUS EVERY 4 HOURS PRN
Status: DISCONTINUED | OUTPATIENT
Start: 2020-01-29 | End: 2020-02-07 | Stop reason: HOSPADM

## 2020-01-29 RX ORDER — OXYTOCIN/0.9 % SODIUM CHLORIDE 30/500 ML
85 PLASTIC BAG, INJECTION (ML) INTRAVENOUS ONCE
Status: DISCONTINUED | OUTPATIENT
Start: 2020-01-29 | End: 2020-01-29 | Stop reason: HOSPADM

## 2020-01-29 RX ORDER — OXYCODONE AND ACETAMINOPHEN 10; 325 MG/1; MG/1
1 TABLET ORAL EVERY 4 HOURS PRN
Status: DISCONTINUED | OUTPATIENT
Start: 2020-01-29 | End: 2020-02-07 | Stop reason: HOSPADM

## 2020-01-29 RX ORDER — OXYTOCIN/0.9 % SODIUM CHLORIDE 30/500 ML
PLASTIC BAG, INJECTION (ML) INTRAVENOUS
Status: DISCONTINUED
Start: 2020-01-29 | End: 2020-01-30 | Stop reason: HOSPADM

## 2020-01-29 RX ORDER — ONDANSETRON 2 MG/ML
4 INJECTION INTRAMUSCULAR; INTRAVENOUS EVERY 6 HOURS PRN
Status: DISCONTINUED | OUTPATIENT
Start: 2020-01-29 | End: 2020-01-29 | Stop reason: HOSPADM

## 2020-01-29 RX ORDER — SODIUM CHLORIDE 0.9 % (FLUSH) 0.9 %
3 SYRINGE (ML) INJECTION EVERY 12 HOURS SCHEDULED
Status: CANCELLED | OUTPATIENT
Start: 2020-01-29

## 2020-01-29 RX ORDER — OXYCODONE HYDROCHLORIDE AND ACETAMINOPHEN 5; 325 MG/1; MG/1
1 TABLET ORAL EVERY 4 HOURS PRN
Status: DISCONTINUED | OUTPATIENT
Start: 2020-01-29 | End: 2020-02-07 | Stop reason: HOSPADM

## 2020-01-29 RX ORDER — SODIUM CHLORIDE 0.9 % (FLUSH) 0.9 %
3 SYRINGE (ML) INJECTION EVERY 12 HOURS SCHEDULED
Status: DISCONTINUED | OUTPATIENT
Start: 2020-01-29 | End: 2020-01-29 | Stop reason: HOSPADM

## 2020-01-29 RX ORDER — HYDROMORPHONE HYDROCHLORIDE 1 MG/ML
0.5 INJECTION, SOLUTION INTRAMUSCULAR; INTRAVENOUS; SUBCUTANEOUS AS NEEDED
Status: DISCONTINUED | OUTPATIENT
Start: 2020-01-29 | End: 2020-01-29 | Stop reason: HOSPADM

## 2020-01-29 RX ORDER — IBUPROFEN 800 MG/1
800 TABLET ORAL 3 TIMES DAILY PRN
Status: DISCONTINUED | OUTPATIENT
Start: 2020-01-29 | End: 2020-02-01

## 2020-01-29 RX ORDER — DOCUSATE SODIUM 100 MG/1
100 CAPSULE, LIQUID FILLED ORAL 2 TIMES DAILY PRN
Status: DISCONTINUED | OUTPATIENT
Start: 2020-01-29 | End: 2020-02-07 | Stop reason: HOSPADM

## 2020-01-29 RX ORDER — PROPOFOL 10 MG/ML
VIAL (ML) INTRAVENOUS AS NEEDED
Status: DISCONTINUED | OUTPATIENT
Start: 2020-01-29 | End: 2020-01-29 | Stop reason: SURG

## 2020-01-29 RX ORDER — OXYTOCIN/0.9 % SODIUM CHLORIDE 30/500 ML
2-20 PLASTIC BAG, INJECTION (ML) INTRAVENOUS
Status: DISCONTINUED | OUTPATIENT
Start: 2020-01-29 | End: 2020-01-29

## 2020-01-29 RX ORDER — MORPHINE SULFATE 0.5 MG/ML
INJECTION, SOLUTION EPIDURAL; INTRATHECAL; INTRAVENOUS
Status: COMPLETED
Start: 2020-01-29 | End: 2020-01-29

## 2020-01-29 RX ORDER — OXYTOCIN/0.9 % SODIUM CHLORIDE 30/500 ML
85 PLASTIC BAG, INJECTION (ML) INTRAVENOUS ONCE
Status: COMPLETED | OUTPATIENT
Start: 2020-01-29 | End: 2020-01-29

## 2020-01-29 RX ORDER — CEFAZOLIN SODIUM 2 G/50ML
2 SOLUTION INTRAVENOUS ONCE
Status: DISCONTINUED | OUTPATIENT
Start: 2020-01-29 | End: 2020-01-29 | Stop reason: HOSPADM

## 2020-01-29 RX ORDER — SODIUM CHLORIDE, SODIUM LACTATE, POTASSIUM CHLORIDE, CALCIUM CHLORIDE 600; 310; 30; 20 MG/100ML; MG/100ML; MG/100ML; MG/100ML
125 INJECTION, SOLUTION INTRAVENOUS CONTINUOUS
Status: DISCONTINUED | OUTPATIENT
Start: 2020-01-29 | End: 2020-01-29

## 2020-01-29 RX ORDER — EPHEDRINE SULFATE 50 MG/ML
INJECTION, SOLUTION INTRAVENOUS AS NEEDED
Status: DISCONTINUED | OUTPATIENT
Start: 2020-01-29 | End: 2020-01-29 | Stop reason: SURG

## 2020-01-29 RX ORDER — MORPHINE SULFATE 0.5 MG/ML
INJECTION, SOLUTION EPIDURAL; INTRATHECAL; INTRAVENOUS AS NEEDED
Status: DISCONTINUED | OUTPATIENT
Start: 2020-01-29 | End: 2020-01-29 | Stop reason: SURG

## 2020-01-29 RX ORDER — SODIUM CHLORIDE 9 MG/ML
40 INJECTION, SOLUTION INTRAVENOUS AS NEEDED
Status: CANCELLED | OUTPATIENT
Start: 2020-01-29

## 2020-01-29 RX ORDER — OXYTOCIN/0.9 % SODIUM CHLORIDE 30/500 ML
650 PLASTIC BAG, INJECTION (ML) INTRAVENOUS ONCE
Status: DISCONTINUED | OUTPATIENT
Start: 2020-01-29 | End: 2020-01-29 | Stop reason: HOSPADM

## 2020-01-29 RX ADMIN — OXYTOCIN-SODIUM CHLORIDE 0.9% IV SOLN 30 UNIT/500ML 650 ML/HR: 30-0.9/5 SOLUTION at 15:09

## 2020-01-29 RX ADMIN — HYDROMORPHONE HYDROCHLORIDE 0.5 MG: 1 INJECTION, SOLUTION INTRAMUSCULAR; INTRAVENOUS; SUBCUTANEOUS at 18:20

## 2020-01-29 RX ADMIN — MORPHINE SULFATE 3 MG: 0.5 INJECTION EPIDURAL; INTRATHECAL; INTRAVENOUS at 17:54

## 2020-01-29 RX ADMIN — GLYCOPYRROLATE 0.4 MG: 0.2 INJECTION INTRAMUSCULAR; INTRAVENOUS at 17:32

## 2020-01-29 RX ADMIN — HYDROMORPHONE HYDROCHLORIDE 0.5 MG: 1 INJECTION, SOLUTION INTRAMUSCULAR; INTRAVENOUS; SUBCUTANEOUS at 22:53

## 2020-01-29 RX ADMIN — MORPHINE SULFATE 0.2 MG: 0.5 INJECTION EPIDURAL; INTRATHECAL; INTRAVENOUS at 17:43

## 2020-01-29 RX ADMIN — SUGAMMADEX 136 MG: 100 INJECTION, SOLUTION INTRAVENOUS at 17:41

## 2020-01-29 RX ADMIN — KETOROLAC TROMETHAMINE 30 MG: 30 INJECTION INTRAMUSCULAR; INTRAVENOUS at 17:56

## 2020-01-29 RX ADMIN — SODIUM CHLORIDE, POTASSIUM CHLORIDE, SODIUM LACTATE AND CALCIUM CHLORIDE 125 ML/HR: 600; 310; 30; 20 INJECTION, SOLUTION INTRAVENOUS at 05:45

## 2020-01-29 RX ADMIN — FENTANYL CITRATE 50 MCG: 50 INJECTION, SOLUTION INTRAMUSCULAR; INTRAVENOUS at 17:20

## 2020-01-29 RX ADMIN — CEFAZOLIN SODIUM 2 G: 2 SOLUTION INTRAVENOUS at 16:23

## 2020-01-29 RX ADMIN — OXYTOCIN-SODIUM CHLORIDE 0.9% IV SOLN 30 UNIT/500ML 2 MILLI-UNITS/MIN: 30-0.9/5 SOLUTION at 06:07

## 2020-01-29 RX ADMIN — ROCURONIUM BROMIDE 30 MG: 10 INJECTION, SOLUTION INTRAVENOUS at 16:25

## 2020-01-29 RX ADMIN — LIDOCAINE HYDROCHLORIDE AND EPINEPHRINE 3 ML: 15; 5 INJECTION, SOLUTION EPIDURAL at 08:51

## 2020-01-29 RX ADMIN — KETOROLAC TROMETHAMINE 30 MG: 30 INJECTION, SOLUTION INTRAMUSCULAR at 23:57

## 2020-01-29 RX ADMIN — ONDANSETRON 4 MG: 2 INJECTION, SOLUTION INTRAMUSCULAR; INTRAVENOUS at 20:32

## 2020-01-29 RX ADMIN — LIDOCAINE HYDROCHLORIDE: 10 INJECTION, SOLUTION EPIDURAL; INFILTRATION; INTRACAUDAL; PERINEURAL at 15:22

## 2020-01-29 RX ADMIN — SODIUM CHLORIDE, POTASSIUM CHLORIDE, SODIUM LACTATE AND CALCIUM CHLORIDE 125 ML/HR: 600; 310; 30; 20 INJECTION, SOLUTION INTRAVENOUS at 20:35

## 2020-01-29 RX ADMIN — LIDOCAINE HYDROCHLORIDE 100 MG: 20 INJECTION, SOLUTION INFILTRATION; PERINEURAL at 16:22

## 2020-01-29 RX ADMIN — METHYLERGONOVINE MALEATE 200 MCG: 0.2 INJECTION, SOLUTION INTRAMUSCULAR; INTRAVENOUS at 15:52

## 2020-01-29 RX ADMIN — SUCCINYLCHOLINE CHLORIDE 100 MG: 20 INJECTION, SOLUTION INTRAMUSCULAR; INTRAVENOUS at 16:22

## 2020-01-29 RX ADMIN — SUFENTANIL CITRATE 2 ML/HR: 50 INJECTION EPIDURAL; INTRAVENOUS at 08:59

## 2020-01-29 RX ADMIN — PHENYLEPHRINE HYDROCHLORIDE 100 MCG: 10 INJECTION INTRAVENOUS at 16:53

## 2020-01-29 RX ADMIN — FENTANYL CITRATE 50 MCG: 50 INJECTION, SOLUTION INTRAMUSCULAR; INTRAVENOUS at 16:36

## 2020-01-29 RX ADMIN — PROPOFOL 120 MG: 10 INJECTION, EMULSION INTRAVENOUS at 16:22

## 2020-01-29 RX ADMIN — HYDROMORPHONE HYDROCHLORIDE 0.5 MG: 1 INJECTION, SOLUTION INTRAMUSCULAR; INTRAVENOUS; SUBCUTANEOUS at 17:58

## 2020-01-29 RX ADMIN — HYDROMORPHONE HYDROCHLORIDE 0.5 MG: 1 INJECTION, SOLUTION INTRAMUSCULAR; INTRAVENOUS; SUBCUTANEOUS at 18:08

## 2020-01-29 RX ADMIN — SODIUM CHLORIDE, POTASSIUM CHLORIDE, SODIUM LACTATE AND CALCIUM CHLORIDE 113 ML/HR: 600; 310; 30; 20 INJECTION, SOLUTION INTRAVENOUS at 10:11

## 2020-01-29 RX ADMIN — EPHEDRINE SULFATE 10 MG: 50 INJECTION, SOLUTION INTRAVENOUS at 09:02

## 2020-01-29 RX ADMIN — NEOSTIGMINE METHYLSULFATE 4 MG: 1 INJECTION INTRAVENOUS at 17:32

## 2020-01-29 RX ADMIN — FENTANYL CITRATE 50 MCG: 50 INJECTION, SOLUTION INTRAMUSCULAR; INTRAVENOUS at 17:45

## 2020-01-29 RX ADMIN — OXYTOCIN-SODIUM CHLORIDE 0.9% IV SOLN 30 UNIT/500ML 85 ML/HR: 30-0.9/5 SOLUTION at 15:41

## 2020-01-29 RX ADMIN — FENTANYL CITRATE 50 MCG: 50 INJECTION, SOLUTION INTRAMUSCULAR; INTRAVENOUS at 16:43

## 2020-01-29 RX ADMIN — OXYTOCIN-SODIUM CHLORIDE 0.9% IV SOLN 30 UNIT/500ML 2 UNITS: 30-0.9/5 SOLUTION at 06:07

## 2020-01-29 NOTE — ANESTHESIA PREPROCEDURE EVALUATION
Anesthesia Evaluation                  Airway   Mallampati: II  TM distance: >3 FB  Neck ROM: full  No difficulty expected  Dental - normal exam     Pulmonary     breath sounds clear to auscultation  Cardiovascular   Exercise tolerance: good (4-7 METS)    Rhythm: regular  Rate: normal    (+) hypertension,       Neuro/Psych  GI/Hepatic/Renal/Endo    (+)  GERD,      Musculoskeletal     Abdominal    Substance History      OB/GYN    (+) Pregnant,         Other                Anesthesia Evaluation     Patient summary reviewed and Nursing notes reviewed   no history of anesthetic complications:  NPO Solid Status: > 8 hours  NPO Liquid Status: > 8 hours           Airway   Mallampati: II  TM distance: >3 FB  Neck ROM: full  Possible difficult intubation  Dental - normal exam     Pulmonary - negative pulmonary ROS and normal exam   Cardiovascular - normal exam    Rhythm: regular  Rate: normal    (+) hypertension well controlled,       Neuro/Psych- negative ROS  GI/Hepatic/Renal/Endo    (+)  GERD,      Musculoskeletal     Abdominal   (+) obese,    Substance History - negative use     OB/GYN    (+) Pregnant, Preeclampsia,         Other - negative ROS                       Anesthesia Plan    ASA 3     epidural       Anesthetic plan, all risks, benefits, and alternatives have been provided, discussed and informed consent has been obtained with: patient.  Use of blood products discussed with patient  Consented to blood products.    Anesthesia Evaluation     Patient summary reviewed and Nursing notes reviewed   no history of anesthetic complications:  NPO Solid Status: > 8 hours  NPO Liquid Status: > 8 hours           Airway   Mallampati: II  TM distance: >3 FB  Neck ROM: full  Possible difficult intubation  Dental - normal exam     Pulmonary - negative pulmonary ROS and normal exam   Cardiovascular - normal exam    Rhythm: regular  Rate: normal    (+) hypertension well controlled,       Neuro/Psych- negative ROS   GI/Hepatic/Renal/Endo    (+)  GERD,      Musculoskeletal     Abdominal   (+) obese,    Substance History - negative use     OB/GYN    (+) Pregnant, Preeclampsia,         Other - negative ROS                       Anesthesia Plan    ASA 3     epidural       Anesthetic plan, all risks, benefits, and alternatives have been provided, discussed and informed consent has been obtained with: patient.  Use of blood products discussed with patient  Consented to blood products.            Anesthesia Plan    ASA 2 - emergent     general   (STAT hysterectomy for uterine rupture after .   Preop anesthesia eval. Reviewed from when epidural was placed.)  intravenous induction     Anesthetic plan, all risks, benefits, and alternatives have been provided, discussed and informed consent has been obtained with: patient.  Use of blood products discussed with patient  Consented to blood products.   Plan discussed with CRNA.

## 2020-01-29 NOTE — ANESTHESIA PROCEDURE NOTES
Labor Epidural      Patient location during procedure: OB  Performed By  CRNA: Wong Hopkins CRNA  Preanesthetic Checklist  Completed: patient identified, surgical consent, pre-op evaluation, timeout performed, IV checked, risks and benefits discussed and monitors and equipment checked  Additional Notes  No csf back through epidural needle but+ aspiration after catheter threaded. Adm. 3cc 1.5% lido with epi and achieved t-10 level. Decided to leave in place and use intrathecal catheter for labor pain. Plan to inj. PF NS after delivery before epidural catheter removal.   Prep:  Pt Position:sitting  Sterile Tech:cap, gloves, mask and gown  Prep:povidone-iodine 7.5% surgical scrub  Monitoring:blood pressure monitoring, continuous pulse oximetry and EKG  Epidural Block Procedure:  Approach:midline  Guidance:landmark technique and palpation technique  Location:L3-L4  Needle Type:Tuohy  Needle Gauge:17  Loss of Resistance Medium: saline  Loss of Resistance: 6cm  Cath Depth at skin:5 cm  Paresthesia: none  Aspiration:positive  Test Dose:positive  Number of Attempts: 1  Post Assessment:  Dressing:occlusive dressing applied  Complications:yes

## 2020-01-29 NOTE — ANESTHESIA PREPROCEDURE EVALUATION
Anesthesia Evaluation     Patient summary reviewed and Nursing notes reviewed   no history of anesthetic complications:  NPO Solid Status: > 8 hours  NPO Liquid Status: > 8 hours           Airway   Mallampati: II  TM distance: >3 FB  Neck ROM: full  Possible difficult intubation  Dental - normal exam     Pulmonary - negative pulmonary ROS and normal exam   Cardiovascular - normal exam    Rhythm: regular  Rate: normal    (+) hypertension well controlled,       Neuro/Psych- negative ROS  GI/Hepatic/Renal/Endo    (+)  GERD,      Musculoskeletal     Abdominal   (+) obese,    Substance History - negative use     OB/GYN    (+) Pregnant, Preeclampsia,         Other - negative ROS                       Anesthesia Plan    ASA 3     epidural       Anesthetic plan, all risks, benefits, and alternatives have been provided, discussed and informed consent has been obtained with: patient.  Use of blood products discussed with patient  Consented to blood products.

## 2020-01-30 LAB
ABO + RH BLD: NORMAL
ALBUMIN SERPL-MCNC: 2.3 G/DL (ref 3.5–5.2)
ALBUMIN/GLOB SERPL: 1.3 G/DL
ALP SERPL-CCNC: 65 U/L (ref 39–117)
ALT SERPL W P-5'-P-CCNC: 10 U/L (ref 1–33)
ANION GAP SERPL CALCULATED.3IONS-SCNC: 5.3 MMOL/L (ref 5–15)
AST SERPL-CCNC: 26 U/L (ref 1–32)
BASOPHILS # BLD AUTO: 0.01 10*3/MM3 (ref 0–0.2)
BASOPHILS NFR BLD AUTO: 0.1 % (ref 0–1.5)
BH BB BLOOD EXPIRATION DATE: NORMAL
BH BB BLOOD TYPE BARCODE: 5100
BH BB BLOOD TYPE BARCODE: 9500
BH BB BLOOD TYPE BARCODE: 9500
BH BB DISPENSE STATUS: NORMAL
BH BB PRODUCT CODE: NORMAL
BH BB UNIT NUMBER: NORMAL
BILIRUB SERPL-MCNC: 0.4 MG/DL (ref 0.2–1.2)
BILIRUB UR QL STRIP: NEGATIVE
BUN BLD-MCNC: 10 MG/DL (ref 6–20)
BUN/CREAT SERPL: 20 (ref 7–25)
CALCIUM SPEC-SCNC: 8.7 MG/DL (ref 8.6–10.5)
CHLORIDE SERPL-SCNC: 100 MMOL/L (ref 98–107)
CLARITY UR: CLEAR
CO2 SERPL-SCNC: 24.7 MMOL/L (ref 22–29)
COLOR UR: YELLOW
CREAT BLD-MCNC: 0.5 MG/DL (ref 0.57–1)
DEPRECATED RDW RBC AUTO: 46.3 FL (ref 37–54)
DEPRECATED RDW RBC AUTO: 46.8 FL (ref 37–54)
EOSINOPHIL # BLD AUTO: 0.03 10*3/MM3 (ref 0–0.4)
EOSINOPHIL NFR BLD AUTO: 0.3 % (ref 0.3–6.2)
ERYTHROCYTE [DISTWIDTH] IN BLOOD BY AUTOMATED COUNT: 13.8 % (ref 12.3–15.4)
ERYTHROCYTE [DISTWIDTH] IN BLOOD BY AUTOMATED COUNT: 14.3 % (ref 12.3–15.4)
GFR SERPL CREATININE-BSD FRML MDRD: >150 ML/MIN/1.73
GLOBULIN UR ELPH-MCNC: 1.8 GM/DL
GLUCOSE BLD-MCNC: 82 MG/DL (ref 65–99)
GLUCOSE UR STRIP-MCNC: NEGATIVE MG/DL
HCT VFR BLD AUTO: 18.9 % (ref 34–46.6)
HCT VFR BLD AUTO: 22.9 % (ref 34–46.6)
HGB BLD-MCNC: 6.3 G/DL (ref 12–15.9)
HGB BLD-MCNC: 8 G/DL (ref 12–15.9)
HGB UR QL STRIP.AUTO: NEGATIVE
IMM GRANULOCYTES # BLD AUTO: 0.07 10*3/MM3 (ref 0–0.05)
IMM GRANULOCYTES NFR BLD AUTO: 0.7 % (ref 0–0.5)
KETONES UR QL STRIP: NEGATIVE
LEUKOCYTE ESTERASE UR QL STRIP.AUTO: NEGATIVE
LYMPHOCYTES # BLD AUTO: 1.55 10*3/MM3 (ref 0.7–3.1)
LYMPHOCYTES NFR BLD AUTO: 16.2 % (ref 19.6–45.3)
MCH RBC QN AUTO: 30.7 PG (ref 26.6–33)
MCH RBC QN AUTO: 31.3 PG (ref 26.6–33)
MCHC RBC AUTO-ENTMCNC: 33.3 G/DL (ref 31.5–35.7)
MCHC RBC AUTO-ENTMCNC: 34.9 G/DL (ref 31.5–35.7)
MCV RBC AUTO: 89.5 FL (ref 79–97)
MCV RBC AUTO: 92.2 FL (ref 79–97)
MONOCYTES # BLD AUTO: 0.7 10*3/MM3 (ref 0.1–0.9)
MONOCYTES NFR BLD AUTO: 7.3 % (ref 5–12)
NEUTROPHILS # BLD AUTO: 7.22 10*3/MM3 (ref 1.7–7)
NEUTROPHILS NFR BLD AUTO: 75.4 % (ref 42.7–76)
NITRITE UR QL STRIP: NEGATIVE
NRBC BLD AUTO-RTO: 0 /100 WBC (ref 0–0.2)
PH UR STRIP.AUTO: 6 [PH] (ref 4.5–8)
PLATELET # BLD AUTO: 86 10*3/MM3 (ref 140–450)
PLATELET # BLD AUTO: 94 10*3/MM3 (ref 140–450)
PMV BLD AUTO: 11.7 FL (ref 6–12)
PMV BLD AUTO: 12.3 FL (ref 6–12)
POTASSIUM BLD-SCNC: 4.2 MMOL/L (ref 3.5–5.2)
PROT SERPL-MCNC: 4.1 G/DL (ref 6–8.5)
PROT UR QL STRIP: NEGATIVE
RBC # BLD AUTO: 2.05 10*6/MM3 (ref 3.77–5.28)
RBC # BLD AUTO: 2.56 10*6/MM3 (ref 3.77–5.28)
SODIUM BLD-SCNC: 130 MMOL/L (ref 136–145)
SP GR UR STRIP: 1.01 (ref 1–1.03)
UNIT  ABO: NORMAL
UNIT  RH: NORMAL
UROBILINOGEN UR QL STRIP: NORMAL
WBC NRBC COR # BLD: 9.58 10*3/MM3 (ref 3.4–10.8)
WBC NRBC COR # BLD: 9.62 10*3/MM3 (ref 3.4–10.8)

## 2020-01-30 PROCEDURE — P9016 RBC LEUKOCYTES REDUCED: HCPCS

## 2020-01-30 PROCEDURE — 80053 COMPREHEN METABOLIC PANEL: CPT | Performed by: OBSTETRICS & GYNECOLOGY

## 2020-01-30 PROCEDURE — 25010000002 HYDROMORPHONE PER 4 MG: Performed by: NURSE ANESTHETIST, CERTIFIED REGISTERED

## 2020-01-30 PROCEDURE — 25010000002 KETOROLAC TROMETHAMINE PER 15 MG: Performed by: NURSE ANESTHETIST, CERTIFIED REGISTERED

## 2020-01-30 PROCEDURE — 86900 BLOOD TYPING SEROLOGIC ABO: CPT

## 2020-01-30 PROCEDURE — 81003 URINALYSIS AUTO W/O SCOPE: CPT | Performed by: OBSTETRICS & GYNECOLOGY

## 2020-01-30 PROCEDURE — P9017 PLASMA 1 DONOR FRZ W/IN 8 HR: HCPCS

## 2020-01-30 PROCEDURE — 36430 TRANSFUSION BLD/BLD COMPNT: CPT

## 2020-01-30 PROCEDURE — 99024 POSTOP FOLLOW-UP VISIT: CPT | Performed by: NURSE PRACTITIONER

## 2020-01-30 PROCEDURE — 85025 COMPLETE CBC W/AUTO DIFF WBC: CPT | Performed by: OBSTETRICS & GYNECOLOGY

## 2020-01-30 PROCEDURE — 85027 COMPLETE CBC AUTOMATED: CPT | Performed by: OBSTETRICS & GYNECOLOGY

## 2020-01-30 PROCEDURE — 86927 PLASMA FRESH FROZEN: CPT

## 2020-01-30 RX ORDER — SODIUM CHLORIDE 9 MG/ML
INJECTION, SOLUTION INTRAVENOUS
Status: DISPENSED
Start: 2020-01-30 | End: 2020-01-30

## 2020-01-30 RX ORDER — SODIUM CHLORIDE 9 MG/ML
50 INJECTION, SOLUTION INTRAVENOUS CONTINUOUS
Status: DISCONTINUED | OUTPATIENT
Start: 2020-01-30 | End: 2020-02-05

## 2020-01-30 RX ADMIN — KETOROLAC TROMETHAMINE 30 MG: 30 INJECTION, SOLUTION INTRAMUSCULAR at 05:59

## 2020-01-30 RX ADMIN — DOCUSATE SODIUM 100 MG: 100 CAPSULE, LIQUID FILLED ORAL at 09:58

## 2020-01-30 RX ADMIN — SODIUM CHLORIDE, POTASSIUM CHLORIDE, SODIUM LACTATE AND CALCIUM CHLORIDE 150 ML/HR: 600; 310; 30; 20 INJECTION, SOLUTION INTRAVENOUS at 03:50

## 2020-01-30 RX ADMIN — OXYCODONE HYDROCHLORIDE AND ACETAMINOPHEN 1 TABLET: 10; 325 TABLET ORAL at 21:37

## 2020-01-30 RX ADMIN — IBUPROFEN 800 MG: 800 TABLET ORAL at 20:30

## 2020-01-30 RX ADMIN — BENZOCAINE AND LEVOMENTHOL: 200; 5 SPRAY TOPICAL at 06:17

## 2020-01-30 RX ADMIN — FAMOTIDINE 20 MG: 20 TABLET ORAL at 20:30

## 2020-01-30 RX ADMIN — FERROUS GLUCONATE TAB 324 MG (37.5 MG ELEMENTAL IRON) 324 MG: 324 (37.5 FE) TAB at 09:57

## 2020-01-30 RX ADMIN — SIMETHICONE CHEW TAB 80 MG 80 MG: 80 TABLET ORAL at 06:16

## 2020-01-30 RX ADMIN — HYDROMORPHONE HYDROCHLORIDE 0.5 MG: 1 INJECTION, SOLUTION INTRAMUSCULAR; INTRAVENOUS; SUBCUTANEOUS at 01:47

## 2020-01-30 RX ADMIN — OXYCODONE HYDROCHLORIDE AND ACETAMINOPHEN 1 TABLET: 5; 325 TABLET ORAL at 10:42

## 2020-01-30 RX ADMIN — KETOROLAC TROMETHAMINE 30 MG: 30 INJECTION, SOLUTION INTRAMUSCULAR at 13:22

## 2020-01-30 RX ADMIN — HYDROCODONE BITARTRATE AND ACETAMINOPHEN 1 TABLET: 7.5; 325 TABLET ORAL at 03:57

## 2020-01-30 RX ADMIN — SIMETHICONE CHEW TAB 80 MG 80 MG: 80 TABLET ORAL at 21:50

## 2020-01-30 RX ADMIN — FAMOTIDINE 20 MG: 20 TABLET ORAL at 09:58

## 2020-01-30 RX ADMIN — OXYCODONE HYDROCHLORIDE AND ACETAMINOPHEN 1 TABLET: 5; 325 TABLET ORAL at 17:38

## 2020-01-30 NOTE — ANESTHESIA POSTPROCEDURE EVALUATION
Patient: Angela Clemente    Procedure Summary     Date:  01/29/20 Room / Location:   [facility] /  [facility]    Anesthesia Start:  1605 Anesthesia Stop:  1759    Procedure:  DIAGNOSTIC LAPAROSCOPY WITH TOTAL ABDOMINAL HYSTERECTOMY (N/A Abdomen) Diagnosis:       Postpartum hemorrhage, unspecified type      (Postpartum hemorrhage, unspecified type [O72.1])    Surgeon:  Riana Murray DO Provider:  Lizzy Villalobos CRNA    Anesthesia Type:  general ASA Status:  2 - Emergent          Anesthesia Type: No value filed.    Vitals  Vitals Value Taken Time   /90 1/29/2020  6:50 PM   Temp 98 °F (36.7 °C) 1/29/2020  5:53 PM   Pulse 69 1/29/2020  6:53 PM   Resp 18 1/29/2020  6:40 PM   SpO2 100 % 1/29/2020  6:53 PM   Vitals shown include unvalidated device data.        Post Anesthesia Care and Evaluation    Patient location during evaluation: bedside  Patient participation: complete - patient participated  Level of consciousness: awake  Pain score: 1  Pain management: adequate  Airway patency: patent  Anesthetic complications: No anesthetic complications  PONV Status: none  Cardiovascular status: acceptable  Respiratory status: acceptable  Hydration status: acceptable      
Patient: Angela Clemente    Procedure Summary     Date:  01/29/20 Room / Location:   [facility] /  [facility]    Anesthesia Start:  1605 Anesthesia Stop:  1759    Procedure:  DIAGNOSTIC LAPAROSCOPY WITH TOTAL ABDOMINAL HYSTERECTOMY (N/A Abdomen) Diagnosis:       Postpartum hemorrhage, unspecified type      (Postpartum hemorrhage, unspecified type [O72.1])    Surgeon:  Riana Murray DO Provider:  Lizzy Villalobos CRNA    Anesthesia Type:  general ASA Status:  2 - Emergent          Anesthesia Type: general    Vitals  Vitals Value Taken Time   /90 1/29/2020  6:50 PM   Temp 98 °F (36.7 °C) 1/29/2020  5:53 PM   Pulse 69 1/29/2020  6:53 PM   Resp 18 1/29/2020  6:40 PM   SpO2 100 % 1/29/2020  6:53 PM   Vitals shown include unvalidated device data.        Post Anesthesia Care and Evaluation    Patient location during evaluation: bedside  Patient participation: complete - patient participated  Level of consciousness: awake  Pain score: 1  Pain management: adequate  Airway patency: patent  Anesthetic complications: No anesthetic complications  PONV Status: none  Cardiovascular status: acceptable  Respiratory status: acceptable  Hydration status: acceptable      
no

## 2020-01-31 LAB
ABO + RH BLD: NORMAL
ALBUMIN SERPL-MCNC: 2.6 G/DL (ref 3.5–5.2)
ALBUMIN/GLOB SERPL: 1 G/DL
ALP SERPL-CCNC: 82 U/L (ref 39–117)
ALT SERPL W P-5'-P-CCNC: 11 U/L (ref 1–33)
ANION GAP SERPL CALCULATED.3IONS-SCNC: 5.6 MMOL/L (ref 5–15)
AST SERPL-CCNC: 25 U/L (ref 1–32)
BASOPHILS # BLD AUTO: 0.02 10*3/MM3 (ref 0–0.2)
BASOPHILS NFR BLD AUTO: 0.2 % (ref 0–1.5)
BH BB BLOOD EXPIRATION DATE: NORMAL
BH BB BLOOD TYPE BARCODE: 5100
BH BB BLOOD TYPE BARCODE: 5100
BH BB BLOOD TYPE BARCODE: 600
BH BB BLOOD TYPE BARCODE: 6200
BH BB DISPENSE STATUS: NORMAL
BH BB PRODUCT CODE: NORMAL
BH BB UNIT NUMBER: NORMAL
BILIRUB SERPL-MCNC: 0.3 MG/DL (ref 0.2–1.2)
BUN BLD-MCNC: 5 MG/DL (ref 6–20)
BUN/CREAT SERPL: 13.2 (ref 7–25)
CALCIUM SPEC-SCNC: 9.7 MG/DL (ref 8.6–10.5)
CHLORIDE SERPL-SCNC: 108 MMOL/L (ref 98–107)
CO2 SERPL-SCNC: 24.4 MMOL/L (ref 22–29)
CREAT BLD-MCNC: 0.38 MG/DL (ref 0.57–1)
CYTO UR: NORMAL
DEPRECATED RDW RBC AUTO: 46.3 FL (ref 37–54)
EOSINOPHIL # BLD AUTO: 0.04 10*3/MM3 (ref 0–0.4)
EOSINOPHIL NFR BLD AUTO: 0.4 % (ref 0.3–6.2)
ERYTHROCYTE [DISTWIDTH] IN BLOOD BY AUTOMATED COUNT: 14.4 % (ref 12.3–15.4)
GFR SERPL CREATININE-BSD FRML MDRD: >150 ML/MIN/1.73
GLOBULIN UR ELPH-MCNC: 2.5 GM/DL
GLUCOSE BLD-MCNC: 87 MG/DL (ref 65–99)
HCT VFR BLD AUTO: 23.2 % (ref 34–46.6)
HGB BLD-MCNC: 8.1 G/DL (ref 12–15.9)
IMM GRANULOCYTES # BLD AUTO: 0.07 10*3/MM3 (ref 0–0.05)
IMM GRANULOCYTES NFR BLD AUTO: 0.7 % (ref 0–0.5)
LAB AP CASE REPORT: NORMAL
LYMPHOCYTES # BLD AUTO: 0.83 10*3/MM3 (ref 0.7–3.1)
LYMPHOCYTES NFR BLD AUTO: 8.7 % (ref 19.6–45.3)
MCH RBC QN AUTO: 31.3 PG (ref 26.6–33)
MCHC RBC AUTO-ENTMCNC: 34.9 G/DL (ref 31.5–35.7)
MCV RBC AUTO: 89.6 FL (ref 79–97)
MONOCYTES # BLD AUTO: 0.62 10*3/MM3 (ref 0.1–0.9)
MONOCYTES NFR BLD AUTO: 6.5 % (ref 5–12)
NEUTROPHILS # BLD AUTO: 7.95 10*3/MM3 (ref 1.7–7)
NEUTROPHILS NFR BLD AUTO: 83.5 % (ref 42.7–76)
NRBC BLD AUTO-RTO: 0 /100 WBC (ref 0–0.2)
PATH REPORT.FINAL DX SPEC: NORMAL
PATH REPORT.GROSS SPEC: NORMAL
PLATELET # BLD AUTO: 84 10*3/MM3 (ref 140–450)
PMV BLD AUTO: 11.7 FL (ref 6–12)
POTASSIUM BLD-SCNC: 3.6 MMOL/L (ref 3.5–5.2)
PROT SERPL-MCNC: 5.1 G/DL (ref 6–8.5)
RBC # BLD AUTO: 2.59 10*6/MM3 (ref 3.77–5.28)
SODIUM BLD-SCNC: 138 MMOL/L (ref 136–145)
UNIT  ABO: NORMAL
UNIT  RH: NORMAL
WBC NRBC COR # BLD: 9.53 10*3/MM3 (ref 3.4–10.8)

## 2020-01-31 PROCEDURE — 99024 POSTOP FOLLOW-UP VISIT: CPT | Performed by: OBSTETRICS & GYNECOLOGY

## 2020-01-31 PROCEDURE — 80053 COMPREHEN METABOLIC PANEL: CPT | Performed by: NURSE PRACTITIONER

## 2020-01-31 PROCEDURE — 85025 COMPLETE CBC W/AUTO DIFF WBC: CPT | Performed by: NURSE PRACTITIONER

## 2020-01-31 RX ADMIN — FAMOTIDINE 20 MG: 20 TABLET ORAL at 20:56

## 2020-01-31 RX ADMIN — DOCUSATE SODIUM 100 MG: 100 CAPSULE, LIQUID FILLED ORAL at 08:30

## 2020-01-31 RX ADMIN — SIMETHICONE CHEW TAB 80 MG 80 MG: 80 TABLET ORAL at 15:53

## 2020-01-31 RX ADMIN — FERROUS GLUCONATE TAB 324 MG (37.5 MG ELEMENTAL IRON) 324 MG: 324 (37.5 FE) TAB at 08:30

## 2020-01-31 RX ADMIN — FAMOTIDINE 20 MG: 20 TABLET ORAL at 08:30

## 2020-01-31 RX ADMIN — OXYCODONE HYDROCHLORIDE AND ACETAMINOPHEN 1 TABLET: 10; 325 TABLET ORAL at 03:53

## 2020-01-31 RX ADMIN — OXYCODONE HYDROCHLORIDE AND ACETAMINOPHEN 1 TABLET: 10; 325 TABLET ORAL at 15:17

## 2020-01-31 RX ADMIN — OXYCODONE HYDROCHLORIDE AND ACETAMINOPHEN 1 TABLET: 10; 325 TABLET ORAL at 08:35

## 2020-01-31 RX ADMIN — IBUPROFEN 800 MG: 800 TABLET ORAL at 15:53

## 2020-01-31 RX ADMIN — OXYCODONE HYDROCHLORIDE AND ACETAMINOPHEN 1 TABLET: 10; 325 TABLET ORAL at 20:56

## 2020-01-31 RX ADMIN — IBUPROFEN 800 MG: 800 TABLET ORAL at 06:37

## 2020-01-31 RX ADMIN — SIMETHICONE CHEW TAB 80 MG 80 MG: 80 TABLET ORAL at 03:56

## 2020-02-01 ENCOUNTER — APPOINTMENT (OUTPATIENT)
Dept: CT IMAGING | Facility: HOSPITAL | Age: 20
End: 2020-02-01

## 2020-02-01 LAB
ALBUMIN SERPL-MCNC: 2.4 G/DL (ref 3.5–5.2)
ALBUMIN/GLOB SERPL: 0.9 G/DL
ALP SERPL-CCNC: 85 U/L (ref 39–117)
ALT SERPL W P-5'-P-CCNC: 10 U/L (ref 1–33)
ANION GAP SERPL CALCULATED.3IONS-SCNC: 8.1 MMOL/L (ref 5–15)
APTT PPP: 27 SECONDS (ref 24.3–38.1)
AST SERPL-CCNC: 18 U/L (ref 1–32)
BASOPHILS # BLD AUTO: 0.02 10*3/MM3 (ref 0–0.2)
BASOPHILS NFR BLD AUTO: 0.2 % (ref 0–1.5)
BILIRUB SERPL-MCNC: 0.2 MG/DL (ref 0.2–1.2)
BUN BLD-MCNC: 5 MG/DL (ref 6–20)
BUN/CREAT SERPL: 12.5 (ref 7–25)
CALCIUM SPEC-SCNC: 10.1 MG/DL (ref 8.6–10.5)
CHLORIDE SERPL-SCNC: 107 MMOL/L (ref 98–107)
CO2 SERPL-SCNC: 24.9 MMOL/L (ref 22–29)
CREAT BLD-MCNC: 0.4 MG/DL (ref 0.57–1)
DEPRECATED RDW RBC AUTO: 44.2 FL (ref 37–54)
DEPRECATED RDW RBC AUTO: 44.7 FL (ref 37–54)
DEPRECATED RDW RBC AUTO: 45.5 FL (ref 37–54)
EOSINOPHIL # BLD AUTO: 0.07 10*3/MM3 (ref 0–0.4)
EOSINOPHIL NFR BLD AUTO: 0.8 % (ref 0.3–6.2)
ERYTHROCYTE [DISTWIDTH] IN BLOOD BY AUTOMATED COUNT: 13.9 % (ref 12.3–15.4)
ERYTHROCYTE [DISTWIDTH] IN BLOOD BY AUTOMATED COUNT: 14 % (ref 12.3–15.4)
ERYTHROCYTE [DISTWIDTH] IN BLOOD BY AUTOMATED COUNT: 14 % (ref 12.3–15.4)
FIBRINOGEN PPP-MCNC: 600 MG/DL (ref 200–400)
GFR SERPL CREATININE-BSD FRML MDRD: >150 ML/MIN/1.73
GLOBULIN UR ELPH-MCNC: 2.6 GM/DL
GLUCOSE BLD-MCNC: 89 MG/DL (ref 65–99)
HCT VFR BLD AUTO: 22 % (ref 34–46.6)
HCT VFR BLD AUTO: 25.1 % (ref 34–46.6)
HCT VFR BLD AUTO: 26.4 % (ref 34–46.6)
HCT VFR BLD AUTO: 26.4 % (ref 34–46.6)
HGB BLD-MCNC: 7.7 G/DL (ref 12–15.9)
HGB BLD-MCNC: 8.7 G/DL (ref 12–15.9)
HGB BLD-MCNC: 9 G/DL (ref 12–15.9)
HGB BLD-MCNC: 9 G/DL (ref 12–15.9)
IMM GRANULOCYTES # BLD AUTO: 0.06 10*3/MM3 (ref 0–0.05)
IMM GRANULOCYTES NFR BLD AUTO: 0.7 % (ref 0–0.5)
INR PPP: 0.95 (ref 0.9–1.1)
LYMPHOCYTES # BLD AUTO: 0.77 10*3/MM3 (ref 0.7–3.1)
LYMPHOCYTES NFR BLD AUTO: 9.1 % (ref 19.6–45.3)
MAGNESIUM SERPL-MCNC: 4.2 MG/DL (ref 1.6–2.6)
MAGNESIUM SERPL-MCNC: 4.4 MG/DL (ref 1.6–2.6)
MCH RBC QN AUTO: 30.2 PG (ref 26.6–33)
MCH RBC QN AUTO: 30.9 PG (ref 26.6–33)
MCH RBC QN AUTO: 31.4 PG (ref 26.6–33)
MCHC RBC AUTO-ENTMCNC: 34.1 G/DL (ref 31.5–35.7)
MCHC RBC AUTO-ENTMCNC: 34.7 G/DL (ref 31.5–35.7)
MCHC RBC AUTO-ENTMCNC: 35 G/DL (ref 31.5–35.7)
MCV RBC AUTO: 88.6 FL (ref 79–97)
MCV RBC AUTO: 89 FL (ref 79–97)
MCV RBC AUTO: 89.8 FL (ref 79–97)
MONOCYTES # BLD AUTO: 0.5 10*3/MM3 (ref 0.1–0.9)
MONOCYTES NFR BLD AUTO: 5.9 % (ref 5–12)
NEUTROPHILS # BLD AUTO: 7.06 10*3/MM3 (ref 1.7–7)
NEUTROPHILS NFR BLD AUTO: 83.3 % (ref 42.7–76)
NRBC BLD AUTO-RTO: 0 /100 WBC (ref 0–0.2)
PLATELET # BLD AUTO: 114 10*3/MM3 (ref 140–450)
PLATELET # BLD AUTO: 158 10*3/MM3 (ref 140–450)
PLATELET # BLD AUTO: 202 10*3/MM3 (ref 140–450)
PMV BLD AUTO: 10.7 FL (ref 6–12)
PMV BLD AUTO: 10.9 FL (ref 6–12)
PMV BLD AUTO: 10.9 FL (ref 6–12)
POTASSIUM BLD-SCNC: 3.5 MMOL/L (ref 3.5–5.2)
PROT SERPL-MCNC: 5 G/DL (ref 6–8.5)
PROTHROMBIN TIME: 12.4 SECONDS (ref 12.1–15)
RBC # BLD AUTO: 2.45 10*6/MM3 (ref 3.77–5.28)
RBC # BLD AUTO: 2.82 10*6/MM3 (ref 3.77–5.28)
RBC # BLD AUTO: 2.98 10*6/MM3 (ref 3.77–5.28)
SODIUM BLD-SCNC: 140 MMOL/L (ref 136–145)
WBC NRBC COR # BLD: 8.02 10*3/MM3 (ref 3.4–10.8)
WBC NRBC COR # BLD: 8.19 10*3/MM3 (ref 3.4–10.8)
WBC NRBC COR # BLD: 8.48 10*3/MM3 (ref 3.4–10.8)

## 2020-02-01 PROCEDURE — 74176 CT ABD & PELVIS W/O CONTRAST: CPT

## 2020-02-01 PROCEDURE — 85027 COMPLETE CBC AUTOMATED: CPT | Performed by: OBSTETRICS & GYNECOLOGY

## 2020-02-01 PROCEDURE — 83735 ASSAY OF MAGNESIUM: CPT | Performed by: OBSTETRICS & GYNECOLOGY

## 2020-02-01 PROCEDURE — 25010000002 HYDRALAZINE PER 20 MG

## 2020-02-01 PROCEDURE — 85610 PROTHROMBIN TIME: CPT | Performed by: OBSTETRICS & GYNECOLOGY

## 2020-02-01 PROCEDURE — 25010000002 LEVOFLOXACIN PER 250 MG: Performed by: OBSTETRICS & GYNECOLOGY

## 2020-02-01 PROCEDURE — 99024 POSTOP FOLLOW-UP VISIT: CPT | Performed by: OBSTETRICS & GYNECOLOGY

## 2020-02-01 PROCEDURE — 85730 THROMBOPLASTIN TIME PARTIAL: CPT | Performed by: OBSTETRICS & GYNECOLOGY

## 2020-02-01 PROCEDURE — 85014 HEMATOCRIT: CPT | Performed by: OBSTETRICS & GYNECOLOGY

## 2020-02-01 PROCEDURE — 80053 COMPREHEN METABOLIC PANEL: CPT | Performed by: OBSTETRICS & GYNECOLOGY

## 2020-02-01 PROCEDURE — 85384 FIBRINOGEN ACTIVITY: CPT | Performed by: OBSTETRICS & GYNECOLOGY

## 2020-02-01 PROCEDURE — 85018 HEMOGLOBIN: CPT | Performed by: OBSTETRICS & GYNECOLOGY

## 2020-02-01 PROCEDURE — 85025 COMPLETE CBC W/AUTO DIFF WBC: CPT | Performed by: OBSTETRICS & GYNECOLOGY

## 2020-02-01 RX ORDER — HYDRALAZINE HYDROCHLORIDE 20 MG/ML
5 INJECTION INTRAMUSCULAR; INTRAVENOUS ONCE
Status: COMPLETED | OUTPATIENT
Start: 2020-02-01 | End: 2020-02-01

## 2020-02-01 RX ORDER — LEVOFLOXACIN 5 MG/ML
500 INJECTION, SOLUTION INTRAVENOUS EVERY 24 HOURS
Status: COMPLETED | OUTPATIENT
Start: 2020-02-01 | End: 2020-02-02

## 2020-02-01 RX ORDER — HYDRALAZINE HYDROCHLORIDE 20 MG/ML
INJECTION INTRAMUSCULAR; INTRAVENOUS
Status: COMPLETED
Start: 2020-02-01 | End: 2020-02-01

## 2020-02-01 RX ORDER — LABETALOL 100 MG/1
100 TABLET, FILM COATED ORAL EVERY 12 HOURS SCHEDULED
Status: DISCONTINUED | OUTPATIENT
Start: 2020-02-01 | End: 2020-02-03

## 2020-02-01 RX ORDER — MAGNESIUM SULFATE HEPTAHYDRATE 40 MG/ML
2 INJECTION, SOLUTION INTRAVENOUS CONTINUOUS
Status: DISPENSED | OUTPATIENT
Start: 2020-02-01 | End: 2020-02-04

## 2020-02-01 RX ORDER — ACETAMINOPHEN 500 MG
1000 TABLET ORAL ONCE
Status: COMPLETED | OUTPATIENT
Start: 2020-02-01 | End: 2020-02-01

## 2020-02-01 RX ADMIN — OXYCODONE HYDROCHLORIDE AND ACETAMINOPHEN 1 TABLET: 5; 325 TABLET ORAL at 02:21

## 2020-02-01 RX ADMIN — METRONIDAZOLE 500 MG: 500 INJECTION, SOLUTION INTRAVENOUS at 20:20

## 2020-02-01 RX ADMIN — LABETALOL HYDROCHLORIDE 100 MG: 100 TABLET, FILM COATED ORAL at 21:09

## 2020-02-01 RX ADMIN — DOCUSATE SODIUM 100 MG: 100 CAPSULE, LIQUID FILLED ORAL at 02:23

## 2020-02-01 RX ADMIN — HYDRALAZINE HYDROCHLORIDE 5 MG: 20 INJECTION INTRAMUSCULAR; INTRAVENOUS at 17:44

## 2020-02-01 RX ADMIN — FERROUS GLUCONATE TAB 324 MG (37.5 MG ELEMENTAL IRON) 324 MG: 324 (37.5 FE) TAB at 10:26

## 2020-02-01 RX ADMIN — ACETAMINOPHEN 1000 MG: 500 TABLET, FILM COATED ORAL at 22:42

## 2020-02-01 RX ADMIN — MAGNESIUM SULFATE HEPTAHYDRATE 2 G/HR: 40 INJECTION, SOLUTION INTRAVENOUS at 18:39

## 2020-02-01 RX ADMIN — IBUPROFEN 800 MG: 800 TABLET ORAL at 02:21

## 2020-02-01 RX ADMIN — FAMOTIDINE 20 MG: 20 TABLET ORAL at 10:26

## 2020-02-01 RX ADMIN — OXYCODONE HYDROCHLORIDE AND ACETAMINOPHEN 1 TABLET: 5; 325 TABLET ORAL at 14:51

## 2020-02-01 RX ADMIN — LEVOFLOXACIN 500 MG: 500 INJECTION, SOLUTION INTRAVENOUS at 19:17

## 2020-02-01 RX ADMIN — SODIUM CHLORIDE, POTASSIUM CHLORIDE, SODIUM LACTATE AND CALCIUM CHLORIDE 75 ML/HR: 600; 310; 30; 20 INJECTION, SOLUTION INTRAVENOUS at 18:04

## 2020-02-01 NOTE — NURSING NOTE
"Spoke with Dr Murray regarding patient status.  Patient c/o feeling feverish.  Temp 99.0 F.  Patient also c/o abdominal \"shaking\" approximately 3 inches above her incision site.  Upon visualization of abdomen, RN noticed bruising to the right abdomen that was firm, tender, and hot to the touch.  "

## 2020-02-01 NOTE — PROGRESS NOTES
"Patient Care Team:  System, Provider Not In as PCP - General        Chief Complaint:  Pelvic hematoma, elevated BP, fever    Subjective  19-year-old status post  that was complicated by uterine rupture and followed by diagnostic laparoscopy with exploratory laparotomy and supracervical hysterectomy- POD#3.  Patient called nursing staff into her room complaining of feeling hot.  Patient's temperature was found to be 102.5.  On physical exam the nursing staff noted some firmness superior to the patient's incision as well as some bruising.  I was called about this change in status and I ordered a stat CBC which revealed a hemoglobin that was stable at 8.7 and platelets 158.  A stat CT scan was ordered and reveals a large hematoma within the pelvic cul-de-sac extending into the adnexal regions measuring 14.4 cm x 11 cm x 5.9 cm.  Hematoma also infiltrates the lower anterior abdominal wall musculature measuring 12.6 cm x 2 cm x 13.8 cm.  Additionally, patient's blood pressures were noted to be very elevated in the 150s to 160s over 100s.  Additionally heart rate tachycardic in the 110s to 120s.  With this change in status I presented to evaluate the patient myself.  Patient reports that she feels good.  She denies any abdominal pain or tenderness.  She reports flatus and she reports bowel movements.  She denies any nausea or vomiting.  She denies any headache or visual changes.      Objective    Vital Signs  Temp:  [98 °F (36.7 °C)-102.5 °F (39.2 °C)] 99.6 °F (37.6 °C)  Heart Rate:  [] 103  Resp:  [18] 18  BP: (130-167)/() 139/93    Flowsheet Rows      First Filed Value   Admission Height  154.9 cm (61\") Documented at 2020 0525   Admission Weight  67.9 kg (149 lb 11.2 oz) Documented at 2020 0525          Physical Exam:  Physical Exam   Constitutional: She is oriented to person, place, and time. She appears well-developed and well-nourished. No distress.   Cardiovascular: Regular rhythm and " normal heart sounds.   tachycardic   Pulmonary/Chest: Breath sounds normal. No stridor. No respiratory distress.   Abdominal: Soft. She exhibits no distension. There is no tenderness. There is no guarding.   Abdomen soft except for 5-6cm superior to abdominal incision where firmness and ecchymosis is noted. +BS. No acute abdomen. Incision well healing.   Musculoskeletal: Normal range of motion. She exhibits no edema or tenderness.   +2DTRs of UE bilaterally.   Neurological: She is alert and oriented to person, place, and time. No cranial nerve deficit. Coordination normal.   Skin: She is not diaphoretic.   Psychiatric: She has a normal mood and affect. Her behavior is normal. Judgment and thought content normal.   Vitals reviewed.      Results Review:     I reviewed the patient's new clinical results.    Lab Results (last 24 hours)     Procedure Component Value Units Date/Time    CBC & Differential [[id number]] Collected:  02/01/20 1523    Specimen:  Blood Updated:  02/01/20 1543    Narrative:       The following orders were created for panel order CBC & Differential.  Procedure                               Abnormality         Status                     ---------                               -----------         ------                     CBC Auto Differential[[id number]]        Abnormal            Final result                 Please view results for these tests on the individual orders.    CBC Auto Differential [[id number]]  (Abnormal) Collected:  02/01/20 1523    Specimen:  Blood Updated:  02/01/20 1543     WBC 8.48 10*3/mm3      RBC 2.82 10*6/mm3      Hemoglobin 8.7 g/dL      Hematocrit 25.1 %      MCV 89.0 fL      MCH 30.9 pg      MCHC 34.7 g/dL      RDW 14.0 %      RDW-SD 44.7 fl      MPV 10.9 fL      Platelets 158 10*3/mm3      Neutrophil % 83.3 %      Lymphocyte % 9.1 %      Monocyte % 5.9 %      Eosinophil % 0.8 %      Basophil % 0.2 %      Immature Grans % 0.7 %      Neutrophils, Absolute 7.06 10*3/mm3       Lymphocytes, Absolute 0.77 10*3/mm3      Monocytes, Absolute 0.50 10*3/mm3      Eosinophils, Absolute 0.07 10*3/mm3      Basophils, Absolute 0.02 10*3/mm3      Immature Grans, Absolute 0.06 10*3/mm3      nRBC 0.0 /100 WBC     Comprehensive Metabolic Panel [[id number]]  (Abnormal) Collected:  02/01/20 0558    Specimen:  Blood Updated:  02/01/20 0645     Glucose 89 mg/dL      BUN 5 mg/dL      Creatinine 0.40 mg/dL      Sodium 140 mmol/L      Potassium 3.5 mmol/L      Chloride 107 mmol/L      CO2 24.9 mmol/L      Calcium 10.1 mg/dL      Total Protein 5.0 g/dL      Albumin 2.40 g/dL      ALT (SGPT) 10 U/L      AST (SGOT) 18 U/L      Alkaline Phosphatase 85 U/L      Total Bilirubin 0.2 mg/dL      eGFR Non African Amer >150 mL/min/1.73      Globulin 2.6 gm/dL      A/G Ratio 0.9 g/dL      BUN/Creatinine Ratio 12.5     Anion Gap 8.1 mmol/L     Narrative:       GFR Normal >60  Chronic Kidney Disease <60  Kidney Failure <15      CBC (No Diff) [[id number]]  (Abnormal) Collected:  02/01/20 0558    Specimen:  Blood Updated:  02/01/20 0611     WBC 8.02 10*3/mm3      RBC 2.45 10*6/mm3      Hemoglobin 7.7 g/dL      Hematocrit 22.0 %      MCV 89.8 fL      MCH 31.4 pg      MCHC 35.0 g/dL      RDW 14.0 %      RDW-SD 45.5 fl      MPV 10.9 fL      Platelets 114 10*3/mm3           Imaging Results (Last 24 Hours)     Procedure Component Value Units Date/Time    CT Abdomen Pelvis Without Contrast [[id number]] Collected:  02/01/20 1626     Updated:  02/01/20 1628    Narrative:       CT Abdomen Pelvis WO 2/1/2020    INDICATION:   3 days postpartum with right lower quadrant abdominal pain and bruising. Uterine rupture, postpartum hemorrhage followed by hysterectomy.    TECHNIQUE:   CT of the abdomen and pelvis without IV contrast. Coronal and sagittal reconstructions were obtained.  Radiation dose reduction techniques included automated exposure control or exposure modulation based on body size. Count of known CT and cardiac nuc  med studies  performed in previous 12 months: 0.     COMPARISON:   None available.    FINDINGS:  Abdomen: The liver, spleen, pancreas, gallbladder and biliary tree and adrenal glands are normal. There are nonobstructing renal stones bilaterally.    Pelvis: The gut is normal in appearance. The appendix is not definitely visualized. There are postoperative changes of recent hysterectomy with hyperdense stranding in the subcutaneous fat of the lower anterior abdominal and pelvic wall characteristic of  hematoma. There is gas seen within the subcutaneous fat of the anterior pelvic wall and in the peritoneal cavity in the prevesical fat from recent surgery. There is a large hematoma within the pelvis measuring approximately 14.4 cm x 5.9 cm x 11 cm  located along the posterior aspect of the urinary bladder and running anterior to the distal colon and rectum. The hematoma extends into the adnexal regions bilaterally. There is mass effect on the urinary bladder posteriorly. Hematoma also infiltrates  the lower anterior abdominal wall musculature measuring approximately 12.6 cm x 2 cm by approximately 13.8 cm. The findings were called to the patient's nurse at 4:20 PM on 2/1/2020. Images of the lung bases demonstrate small bilateral pleural effusions  with by basilar atelectasis. There is a small hiatal hernia.      Impression:         1. Abnormal examination demonstrating large hematoma within the pelvic cul-de-sac extending into the adnexal regions bilaterally measuring 14.4 cm x 11 cm x 5.9 cm. Hematoma also infiltrates the lower anterior abdominal wall musculature measuring 12.6 cm  x 2 cm x 13.8 cm.  2. Postoperative changes of recent abdominal surgery with hematoma stranding in the subcutaneous fat of the lower anterior abdominal and pelvic wall as well as gas in the subcutaneous fat of the lower anterior and pelvic abdominal wall as well as in the  prevesical fat.  3. Nonobstructing renal stones bilaterally.  4. Small bilateral  pleural effusions with bibasilar atelectasis.          Signer Name: Trey Fuentes MD   Signed: 2/1/2020 4:26 PM   Workstation Name: [id number]    [facility]          ECG/EMG Results (most recent)     None          Medication Review:   I have reviewed the patient's current medication list    Current Facility-Administered Medications:   •  diphenhydrAMINE (BENADRYL) capsule 25 mg, 25 mg, Oral, Q4H PRN **OR** diphenhydrAMINE (BENADRYL) injection 25 mg, 25 mg, Intravenous, Q4H PRN **OR** diphenhydrAMINE (BENADRYL) injection 25 mg, 25 mg, Intramuscular, Q4H PRN, Lizzy Villalobos CRNA  •  docusate sodium (COLACE) capsule 100 mg, 100 mg, Oral, BID PRN, Riana Murray DO, 100 mg at 02/01/20 0223  •  famotidine (PEPCID) tablet 20 mg, 20 mg, Oral, BID, Riana Murray DO, 20 mg at 02/01/20 1026  •  ferrous gluconate (FERGON) tablet 324 mg, 324 mg, Oral, Daily With Breakfast, Riana Murray DO, 324 mg at 02/01/20 1026  •  labetalol (NORMODYNE) tablet 100 mg, 100 mg, Oral, Q12H, Riana Murray DO  •  lactated ringers infusion, 150 mL/hr, Intravenous, Continuous, George Hanson MD, Last Rate: 150 mL/hr at 02/01/20 1804, 150 mL/hr at 02/01/20 1804  •  levoFLOXacin (LEVAQUIN) 500 mg/100 mL D5W (premix) (LEVAQUIN) 500 mg, 500 mg, Intravenous, Q24H, Riana Murray DO  •  magnesium sulfate bolus from bag 0.04 g/mL 6 g, 6 g, Intravenous, Once **FOLLOWED BY** magnesium sulfate 20 GM/500ML infusion, 2 g/hr, Intravenous, Continuous, Riana Murray DO, Last Rate: 50 mL/hr at 02/01/20 1806, 2 g/hr at 02/01/20 1806  •  metroNIDAZOLE (FLAGYL) 500 mg/100mL IVPB, 500 mg, Intravenous, Q8H, Riana Murray, DO  •  oxyCODONE-acetaminophen (PERCOCET)  MG per tablet 1 tablet, 1 tablet, Oral, Q4H PRN, Riana Murray, , 1 tablet at 01/31/20 2056  •  oxyCODONE-acetaminophen (PERCOCET) 5-325 MG per tablet 1 tablet, 1 tablet, Oral, Q4H PRN, Riana Murray DO, 1 tablet at 02/01/20 1451  •   simethicone (MYLICON) chewable tablet 80 mg, 80 mg, Oral, 4x Daily PRN, Riana Murray, DO, 80 mg at 20 1553  •  sodium chloride 0.9 % infusion, 50 mL/hr, Intravenous, Continuous, George Hanson MD  •  witch hazel-glycerin (TUCKS) pad, , Topical, PRN, George Hanson MD      Assessment/Plan     19-year-old -1-0-2 status post  followed by uterine rupture and subsequent diagnostic laparoscopy with exploratory laparotomy and supracervical hysterectomy now with elevated BP, fever and large pelvic hematoma     1) PPD#3/POD#3:  She is ambulating and tolerating a general diet.  Pt is voiding freely today. Her pain is controlled.    2) Elevated blood pressure: She has blood pressure severely elevated in the 150s to 160s over 100s.  Patient has a history of severe preeclampsia with her prior pregnancy.  Due to this abrupt change in blood pressure will start magnesium sulfate with a presumptive diagnosis of severe preeclampsia.  Andrea catheter will be inserted to monitor I's and O's and a 24-hour urine collection will be started.  Magnesium 6 g bolus followed by 2 g/h.  Will check magnesium level 4 hours after administration.  Patient was given a dose of hydralazine 5 mg and her blood pressures came down well to the 130s over 80s.  Will start labetalol 100 mg p.o. twice daily.    3) fever: Etiology of fever is unknown but likely due to the hematoma.  Will start IV Flagyl and Levaquin.  White blood cell count is normal at 8K.    4) pelvic hematoma: Large pelvic hematoma noted on CT scan today.  Patient is asymptomatic with a hematoma.  She denies any pain.  It is unknown at this time if the hematoma is expanding in size.  We will follow serial CBCs to monitor her hemoglobin.  Currently patient's hemoglobin has improved from 7.7 this morning to 8.7 now.  We will check PT PTT and fibrinogen level.  Will discontinue ibuprofen.  If hematoma appears to be expanding or patient's blood counts  begin to decrease then will consider exploratory laparotomy with evacuation of hematoma.     5) Acute blood loss anemia: In total patient lost approximately 4 L of blood from delivery and the supracervical hysterectomy.  She has received 6 units of packed red blood cells and 4 units of FFP.  Hemoglobin has improved to 8.7.    6) thrombocytopenia: Improving. Pt's platelets are now 158.    The plan for the patient was thoroughly explained to patient and father of the baby via .  All questions were answered.  Patient will be brought over to the labor and delivery unit so she can be closely monitored.    Riana Murray DO  02/01/20  6:17 PM

## 2020-02-01 NOTE — PROGRESS NOTES
"Patient Care Team:  System, Provider Not In as PCP - General        Chief Complaint:  PPD/POD#3    Subjective    19-year-old status post  that was complicated by uterine rupture and followed by diagnostic laparoscopy with exploratory laparotomy and supracervical hysterectomy.  Pt reports that she feels well.  She does not have any lightheadedness or dizziness.  She denies any nausea or vomiting.  Her hemoglobin is noted to be 7.7 today.  Her pain is well controlled.  She is ambulating without difficulty. She is voiding freely. She is tolerating a general diet.  Patient is breast-feeding.     Objective    Vital Signs  Temp:  [98 °F (36.7 °C)-99.6 °F (37.6 °C)] 99.6 °F (37.6 °C)  Heart Rate:  [] 105  Resp:  [18] 18  BP: (130-148)/() 136/92    Flowsheet Rows      First Filed Value   Admission Height  154.9 cm (61\") Documented at 2020   Admission Weight  67.9 kg (149 lb 11.2 oz) Documented at 2020 05          Physical Exam:  Physical Exam   Constitutional: She is oriented to person, place, and time. She appears well-developed and well-nourished. No distress.   Cardiovascular: Normal rate, regular rhythm and normal heart sounds.   Pulmonary/Chest: Effort normal and breath sounds normal. No stridor. No respiratory distress. She has no wheezes.   Abdominal: Soft. She exhibits no distension and no mass. There is no tenderness. There is no guarding.   Incision healing well.   Neurological: She is alert and oriented to person, place, and time. No cranial nerve deficit. Coordination normal.   Skin: She is not diaphoretic.   Psychiatric: She has a normal mood and affect. Her behavior is normal. Judgment and thought content normal.   Vitals reviewed.      Results Review:     I reviewed the patient's new clinical results.    Lab Results (last 24 hours)     Procedure Component Value Units Date/Time    Comprehensive Metabolic Panel [[id number]]  (Abnormal) Collected:  20 0558    Specimen:  " Blood Updated:  02/01/20 0645     Glucose 89 mg/dL      BUN 5 mg/dL      Creatinine 0.40 mg/dL      Sodium 140 mmol/L      Potassium 3.5 mmol/L      Chloride 107 mmol/L      CO2 24.9 mmol/L      Calcium 10.1 mg/dL      Total Protein 5.0 g/dL      Albumin 2.40 g/dL      ALT (SGPT) 10 U/L      AST (SGOT) 18 U/L      Alkaline Phosphatase 85 U/L      Total Bilirubin 0.2 mg/dL      eGFR Non African Amer >150 mL/min/1.73      Globulin 2.6 gm/dL      A/G Ratio 0.9 g/dL      BUN/Creatinine Ratio 12.5     Anion Gap 8.1 mmol/L     Narrative:       GFR Normal >60  Chronic Kidney Disease <60  Kidney Failure <15      CBC (No Diff) [[id number]]  (Abnormal) Collected:  02/01/20 0558    Specimen:  Blood Updated:  02/01/20 0611     WBC 8.02 10*3/mm3      RBC 2.45 10*6/mm3      Hemoglobin 7.7 g/dL      Hematocrit 22.0 %      MCV 89.8 fL      MCH 31.4 pg      MCHC 35.0 g/dL      RDW 14.0 %      RDW-SD 45.5 fl      MPV 10.9 fL      Platelets 114 10*3/mm3     Tissue Pathology Exam [[id number]] Collected:  01/29/20 1722    Specimen:  Tissue from Uterus Updated:  01/31/20 1454     Case Report --     Surgical Pathology Report                         Case: [id number]                                  Authorizing Provider:  Riana Murray DO       Collected:           01/29/2020 05:22 PM          Ordering Location:     [facility]   Received:            01/29/2020 06:17 PM                                 OR                                                                           Pathologist:           Angelo Teixeira MD                                                         Specimen:    Uterus                                                                                      Final Diagnosis --     1. Uterus and Endocervix, and Fetal Membranes, Hysterectomy:   A. Endocervix:    1. Focal mucosal erosion and decidual change.    2. No dysplasia nor malignancy identified.    B. Endometrium:     1. Predominantly slough denuded  "hemorrhagic endometrial stromal with diffuse decidual effect.    2. No atypical endometrial hyperplasia, malignancy nor definitive retained products of conception         identified.    C. Myometrium:    1. Within normal limits.    2. No malignancy identified.   D. Serosa:     1. Within  Normal limits.    2. No endometriosis nor malignancy identified.   E. Separately submitted fetal membranes:    1. Scattered mild to moderate meconium staining.    2. No significant active inflammation nor viral inclusions identified.   F. Blood clot:     1. Organizng blood clot with retained fragments of myometrial and parametrial tissue, 7.0 x 5.5 x 3.0 cm.    2. No associated active vasculitis nor arterial dissection identified.    jat/марина        Gross Description --     1. Received in formalin labeled \"uterus\" is a 1,017 gram, 16.0 x 15.0 x 8.5 cm uterus and cervix.  There is minimal visible ectocervix which appears to be on the anterior aspect which measures 4.0 x 1.6 cm.  The os is ovoid and patent measuring 7 cm in diameter.  The endocervical canal measures 5.5 cm in length and 6 cm in circumference and has a marked amount of adherent clotted blood.  On the right lateral aspect surrounding the parametria is a 7.0 x 5.5 x 3.0 cm focus of hemorrhage and adherent clotted blood which is adjacent to the parametrial vessels.  There appears to be no placental tissue within the cervix or this area within the parametria.  The endometrial cavity is triangular measuring 9.5 cm from cornu to cornu and 10 cm in length.  The endometrium is hemorrhagic and averages 0.4 cm in thickness.  No residual placenta is grossly identified within the endometrial cavity as well.  The myometrium is tan pink and trabeculated, averaging 4.8 cm in thickness.  There are no discrete lesions or masses throughout the uterus and cervix.  Also received in the same container is a 7.0 x 5.0 x 3.0 cm aggregate of tan green placental membranes which is " semitransparent.  No placental tissue is grossly identified within the specimen.  Representative sections are submitted as follows:   1A-1B - anterior endo-ectocervix.   1C-1D - posterior cervix with no grossly visible ectocervix.  1E-1F - anterior endomyometrium.  1G-1H - posterior endomyometrium.    II-1J - placental membrane received separately in the same container.   1K-1P - sections from the hemorrhage in the right lateral parametrial area.     JAP/USO/JAB/brb         Microscopic Description --     Performed, incorporated in diagnosis.            Imaging Results (Last 24 Hours)     ** No results found for the last 24 hours. **          ECG/EMG Results (most recent)     None          Medication Review:   I have reviewed the patient's current medication list    Current Facility-Administered Medications:   •  diphenhydrAMINE (BENADRYL) capsule 25 mg, 25 mg, Oral, Q4H PRN **OR** diphenhydrAMINE (BENADRYL) injection 25 mg, 25 mg, Intravenous, Q4H PRN **OR** diphenhydrAMINE (BENADRYL) injection 25 mg, 25 mg, Intramuscular, Q4H PRN, Lizzy Villalobos, CRNA  •  docusate sodium (COLACE) capsule 100 mg, 100 mg, Oral, BID PRN, Riana Murray DO, 100 mg at 02/01/20 0223  •  famotidine (PEPCID) tablet 20 mg, 20 mg, Oral, BID, Riana Murray DO, 20 mg at 02/01/20 1026  •  ferrous gluconate (FERGON) tablet 324 mg, 324 mg, Oral, Daily With Breakfast, Riana Murray DO, 324 mg at 02/01/20 1026  •  ibuprofen (ADVIL,MOTRIN) tablet 800 mg, 800 mg, Oral, TID PRN, Riana Murray DO, 800 mg at 02/01/20 0221  •  lactated ringers infusion, 150 mL/hr, Intravenous, Continuous, George Hanson MD, Stopped at 01/30/20 1730  •  oxyCODONE-acetaminophen (PERCOCET)  MG per tablet 1 tablet, 1 tablet, Oral, Q4H PRN, Riana Murray DO, 1 tablet at 01/31/20 2056  •  oxyCODONE-acetaminophen (PERCOCET) 5-325 MG per tablet 1 tablet, 1 tablet, Oral, Q4H PRN, Riana Murray DO, 1 tablet at 02/01/20 0221  •  simethicone  (MYLICON) chewable tablet 80 mg, 80 mg, Oral, 4x Daily PRN, Riana Murray, , 80 mg at 20 1553  •  sodium chloride 0.9 % infusion, 50 mL/hr, Intravenous, Continuous, George Hanson MD  •  witch hazel-glycerin (TUCKS) pad, , Topical, PRN, George Hanson MD      Assessment/Plan     19-year-old -1-0-2 status post  followed by uterine rupture and subsequent diagnostic laparoscopy with exploratory laparotomy and supracervical hysterectomy.     1) PPD#3/POD#3: Pt is stable and progressing.  She is ambulating and tolerating a general diet.  Pt is voiding freely today. Her pain is controlled.     2) Acute blood loss anemia: In total patient lost approximately 4 L of blood from delivery and the supracervical hysterectomy.  She has received 6 units of packed red blood cells and 4 units of FFP. Her hemoglobin is 7.7 this am which is essentially stable from yesterday.  Patient denies any dizziness or lightheadedness.  Patient will need to be maintained on iron the next 3 months.  We will check another CBC in the a.m.     3) thrombocytopenia: Improving. Pt's platelets improved from 84 to 114. Will repeat in am.    4) Elevated BP: Pt had a couple elevated BP's overnight. She has a hx of severe pre eclampsia. Her BP's are only mildly elevated. Will continue to monitor overnight.    5) Dispo: Suspect pt will be discharged tomorrow if CBC is stable and BP's are not elevating. Plan reviewed with pt and FOB.      Riana Murray DO  20  12:17 PM

## 2020-02-02 LAB
CLOSURE TME COLL+ADP BLD: 100 SECONDS (ref 52–122)
CLOSURE TME COLL+EPINEP BLD: 208 SECONDS (ref 68–148)
HCT VFR BLD AUTO: 23.7 % (ref 34–46.6)
HCT VFR BLD AUTO: 24.5 % (ref 34–46.6)
HGB BLD-MCNC: 8.1 G/DL (ref 12–15.9)
HGB BLD-MCNC: 8.5 G/DL (ref 12–15.9)
MAGNESIUM SERPL-MCNC: 5.2 MG/DL (ref 1.6–2.6)
MAGNESIUM SERPL-MCNC: 5.4 MG/DL (ref 1.6–2.6)
PROT 24H UR-MRATE: 385 MG/24HOURS (ref 28–141)

## 2020-02-02 PROCEDURE — 85018 HEMOGLOBIN: CPT | Performed by: OBSTETRICS & GYNECOLOGY

## 2020-02-02 PROCEDURE — 99024 POSTOP FOLLOW-UP VISIT: CPT | Performed by: OBSTETRICS & GYNECOLOGY

## 2020-02-02 PROCEDURE — 84156 ASSAY OF PROTEIN URINE: CPT | Performed by: OBSTETRICS & GYNECOLOGY

## 2020-02-02 PROCEDURE — 85576 BLOOD PLATELET AGGREGATION: CPT | Performed by: OBSTETRICS & GYNECOLOGY

## 2020-02-02 PROCEDURE — 81050 URINALYSIS VOLUME MEASURE: CPT | Performed by: OBSTETRICS & GYNECOLOGY

## 2020-02-02 PROCEDURE — 83735 ASSAY OF MAGNESIUM: CPT | Performed by: OBSTETRICS & GYNECOLOGY

## 2020-02-02 PROCEDURE — 25010000002 MAGNESIUM SULFATE IN D5W 1G/100ML (PREMIX) 1-5 GM/100ML-% SOLUTION: Performed by: OBSTETRICS & GYNECOLOGY

## 2020-02-02 PROCEDURE — 25010000002 LEVOFLOXACIN PER 250 MG: Performed by: OBSTETRICS & GYNECOLOGY

## 2020-02-02 PROCEDURE — 85014 HEMATOCRIT: CPT | Performed by: OBSTETRICS & GYNECOLOGY

## 2020-02-02 RX ORDER — SODIUM CHLORIDE, SODIUM LACTATE, POTASSIUM CHLORIDE, CALCIUM CHLORIDE 600; 310; 30; 20 MG/100ML; MG/100ML; MG/100ML; MG/100ML
75 INJECTION, SOLUTION INTRAVENOUS CONTINUOUS
Status: DISCONTINUED | OUTPATIENT
Start: 2020-02-02 | End: 2020-02-06

## 2020-02-02 RX ORDER — MAGNESIUM SULFATE 1 G/100ML
1 INJECTION INTRAVENOUS ONCE
Status: COMPLETED | OUTPATIENT
Start: 2020-02-02 | End: 2020-02-02

## 2020-02-02 RX ADMIN — SODIUM CHLORIDE, POTASSIUM CHLORIDE, SODIUM LACTATE AND CALCIUM CHLORIDE 75 ML/HR: 600; 310; 30; 20 INJECTION, SOLUTION INTRAVENOUS at 19:53

## 2020-02-02 RX ADMIN — METRONIDAZOLE 500 MG: 500 INJECTION, SOLUTION INTRAVENOUS at 04:08

## 2020-02-02 RX ADMIN — MAGNESIUM SULFATE HEPTAHYDRATE 2 G/HR: 40 INJECTION, SOLUTION INTRAVENOUS at 12:19

## 2020-02-02 RX ADMIN — MAGNESIUM SULFATE HEPTAHYDRATE 2 G/HR: 40 INJECTION, SOLUTION INTRAVENOUS at 01:34

## 2020-02-02 RX ADMIN — FAMOTIDINE 20 MG: 20 TABLET ORAL at 08:05

## 2020-02-02 RX ADMIN — FERROUS GLUCONATE TAB 324 MG (37.5 MG ELEMENTAL IRON) 324 MG: 324 (37.5 FE) TAB at 08:05

## 2020-02-02 RX ADMIN — OXYCODONE HYDROCHLORIDE AND ACETAMINOPHEN 1 TABLET: 10; 325 TABLET ORAL at 07:08

## 2020-02-02 RX ADMIN — OXYCODONE HYDROCHLORIDE AND ACETAMINOPHEN 1 TABLET: 10; 325 TABLET ORAL at 11:29

## 2020-02-02 RX ADMIN — SODIUM CHLORIDE, POTASSIUM CHLORIDE, SODIUM LACTATE AND CALCIUM CHLORIDE 75 ML/HR: 600; 310; 30; 20 INJECTION, SOLUTION INTRAVENOUS at 10:40

## 2020-02-02 RX ADMIN — LABETALOL HYDROCHLORIDE 100 MG: 100 TABLET, FILM COATED ORAL at 20:58

## 2020-02-02 RX ADMIN — LABETALOL HYDROCHLORIDE 100 MG: 100 TABLET, FILM COATED ORAL at 08:05

## 2020-02-02 RX ADMIN — METRONIDAZOLE 500 MG: 500 INJECTION, SOLUTION INTRAVENOUS at 11:24

## 2020-02-02 RX ADMIN — MAGNESIUM SULFATE IN DEXTROSE 1 G: 10 INJECTION, SOLUTION INTRAVENOUS at 01:15

## 2020-02-02 RX ADMIN — METRONIDAZOLE 500 MG: 500 INJECTION, SOLUTION INTRAVENOUS at 21:13

## 2020-02-02 RX ADMIN — LEVOFLOXACIN 500 MG: 500 INJECTION, SOLUTION INTRAVENOUS at 19:53

## 2020-02-02 NOTE — PROGRESS NOTES
"Patient Care Team:  System, Provider Not In as PCP - General        Chief Complaint:  PPD/POD#4    Subjective  Patient reports that she is feeling better today.  She has been having a headache but she describes it as not too painful.  She reports that she had a similar headache when she was on magnesium sulfate after delivery of her first child.  Otherwise, patient has no complaints.  She is not reporting any abdominal discomfort.  She has not required any pain meds overnight.  She is tolerating a general diet.  She was reporting flatus.  Patient is currently on bedrest with a Andrea catheter in place with SCD boots on.  Patient is breast-feeding.      Objective    Vital Signs  Temp:  [98.1 °F (36.7 °C)-102.5 °F (39.2 °C)] 98.1 °F (36.7 °C)  Heart Rate:  [] 84  Resp:  [18] 18  BP: (117-167)/() 137/91    Flowsheet Rows      First Filed Value   Admission Height  154.9 cm (61\") Documented at 01/29/2020 0525   Admission Weight  67.9 kg (149 lb 11.2 oz) Documented at 01/29/2020 0525          Physical Exam:  Physical Exam   Constitutional: She is oriented to person, place, and time. She appears well-developed and well-nourished. No distress.   Cardiovascular: Normal rate, regular rhythm and normal heart sounds.   Pulmonary/Chest: Effort normal and breath sounds normal. No stridor. No respiratory distress. She has no wheezes. She has no rales.   Abdominal: Soft. She exhibits no distension. There is no tenderness. There is no guarding.   Area where hematoma was marked is stable. The area is not as firm to palpation as yesterday. Abdominal exam is benign- pt denies tenderness. Incision healing well.   Musculoskeletal: Normal range of motion. She exhibits no edema or tenderness.   Neurological: She is alert and oriented to person, place, and time. No cranial nerve deficit. Coordination normal.   Skin: Skin is warm. She is not diaphoretic. No erythema. No pallor.   Psychiatric: She has a normal mood and affect. Her " behavior is normal. Judgment and thought content normal.   Vitals reviewed.      Results Review:     I reviewed the patient's new clinical results.    Lab Results (last 24 hours)     Procedure Component Value Units Date/Time    Magnesium [[id number]]  (Abnormal) Collected:  02/02/20 0757    Specimen:  Blood Updated:  02/02/20 0830     Magnesium 5.4 mg/dL     Hemoglobin & Hematocrit, Blood [[id number]]  (Abnormal) Collected:  02/02/20 0757    Specimen:  Blood Updated:  02/02/20 0812     Hemoglobin 8.5 g/dL      Hematocrit 24.5 %     Platelet Function Test [[id number]]  (Abnormal) Collected:  02/02/20 0358    Specimen:  Blood Updated:  02/02/20 0611     Collagen/Epinephrine 208 Seconds      Collagen/ Seconds     Narrative:       PFA Interpretation    Norm ADP / Norm EPI: Normal Plt Function                                                                                                                  Norm ADP / Abn EPI: Drug Induced Platelet Dysfunction, most commonly seen with Aspirin                              Abn ADP / Abn EPI: Abnormal Platelet Function, recommend further evaluation    Magnesium [[id number]]  (Abnormal) Collected:  02/02/20 0358    Specimen:  Blood Updated:  02/02/20 0503     Magnesium 5.2 mg/dL     Hemoglobin & Hematocrit, Blood [[id number]]  (Abnormal) Collected:  02/02/20 0358    Specimen:  Blood Updated:  02/02/20 0418     Hemoglobin 8.1 g/dL      Hematocrit 23.7 %     Magnesium [[id number]]  (Abnormal) Collected:  02/01/20 2230    Specimen:  Blood Updated:  02/01/20 2256     Magnesium 4.2 mg/dL     Fibrinogen [[id number]]  (Abnormal) Collected:  02/01/20 2230    Specimen:  Blood Updated:  02/01/20 2253     Fibrinogen 600 mg/dL     CBC (No Diff) [[id number]]  (Abnormal) Collected:  02/01/20 2230    Specimen:  Blood Updated:  02/01/20 2242     WBC 8.19 10*3/mm3      RBC 2.98 10*6/mm3      Hemoglobin 9.0 g/dL      Hematocrit 26.4 %      MCV 88.6 fL      MCH 30.2 pg      MCHC 34.1 g/dL      RDW  13.9 %      RDW-SD 44.2 fl      MPV 10.7 fL      Platelets 202 10*3/mm3     Hemoglobin & Hematocrit, Blood [[id number]]  (Abnormal) Collected:  02/01/20 2230    Specimen:  Blood Updated:  02/01/20 2242     Hemoglobin 9.0 g/dL      Hematocrit 26.4 %     Magnesium [[id number]]  (Abnormal) Collected:  02/01/20 1849    Specimen:  Blood Updated:  02/01/20 1919     Magnesium 4.4 mg/dL     aPTT [[id number]]  (Normal) Collected:  02/01/20 1849    Specimen:  Blood Updated:  02/01/20 1918     PTT 27.0 seconds     Narrative:       PTT = The equivalent PTT values for the therapeutic range of heparin levels at 0.1 to 0.7 U/ml are 53 to 110 seconds.      Protime-INR [[id number]]  (Normal) Collected:  02/01/20 1849    Specimen:  Blood Updated:  02/01/20 1918     Protime 12.4 Seconds      INR 0.95    Narrative:       Therapeutic Ranges for INR: 2.0-3.0 (PT 20-30)                              2.5-3.5 (PT 25-34)    CBC & Differential [[id number]] Collected:  02/01/20 1523    Specimen:  Blood Updated:  02/01/20 1543    Narrative:       The following orders were created for panel order CBC & Differential.  Procedure                               Abnormality         Status                     ---------                               -----------         ------                     CBC Auto Differential[[id number]]        Abnormal            Final result                 Please view results for these tests on the individual orders.    CBC Auto Differential [[id number]]  (Abnormal) Collected:  02/01/20 1523    Specimen:  Blood Updated:  02/01/20 1543     WBC 8.48 10*3/mm3      RBC 2.82 10*6/mm3      Hemoglobin 8.7 g/dL      Hematocrit 25.1 %      MCV 89.0 fL      MCH 30.9 pg      MCHC 34.7 g/dL      RDW 14.0 %      RDW-SD 44.7 fl      MPV 10.9 fL      Platelets 158 10*3/mm3      Neutrophil % 83.3 %      Lymphocyte % 9.1 %      Monocyte % 5.9 %      Eosinophil % 0.8 %      Basophil % 0.2 %      Immature Grans % 0.7 %      Neutrophils, Absolute 7.06  10*3/mm3      Lymphocytes, Absolute 0.77 10*3/mm3      Monocytes, Absolute 0.50 10*3/mm3      Eosinophils, Absolute 0.07 10*3/mm3      Basophils, Absolute 0.02 10*3/mm3      Immature Grans, Absolute 0.06 10*3/mm3      nRBC 0.0 /100 WBC           Imaging Results (Last 24 Hours)     Procedure Component Value Units Date/Time    CT Abdomen Pelvis Without Contrast [[id number]] Collected:  02/01/20 1626     Updated:  02/01/20 1628    Narrative:       CT Abdomen Pelvis WO 2/1/2020    INDICATION:   3 days postpartum with right lower quadrant abdominal pain and bruising. Uterine rupture, postpartum hemorrhage followed by hysterectomy.    TECHNIQUE:   CT of the abdomen and pelvis without IV contrast. Coronal and sagittal reconstructions were obtained.  Radiation dose reduction techniques included automated exposure control or exposure modulation based on body size. Count of known CT and cardiac nuc  med studies performed in previous 12 months: 0.     COMPARISON:   None available.    FINDINGS:  Abdomen: The liver, spleen, pancreas, gallbladder and biliary tree and adrenal glands are normal. There are nonobstructing renal stones bilaterally.    Pelvis: The gut is normal in appearance. The appendix is not definitely visualized. There are postoperative changes of recent hysterectomy with hyperdense stranding in the subcutaneous fat of the lower anterior abdominal and pelvic wall characteristic of  hematoma. There is gas seen within the subcutaneous fat of the anterior pelvic wall and in the peritoneal cavity in the prevesical fat from recent surgery. There is a large hematoma within the pelvis measuring approximately 14.4 cm x 5.9 cm x 11 cm  located along the posterior aspect of the urinary bladder and running anterior to the distal colon and rectum. The hematoma extends into the adnexal regions bilaterally. There is mass effect on the urinary bladder posteriorly. Hematoma also infiltrates  the lower anterior abdominal wall  musculature measuring approximately 12.6 cm x 2 cm by approximately 13.8 cm. The findings were called to the patient's nurse at 4:20 PM on 2/1/2020. Images of the lung bases demonstrate small bilateral pleural effusions  with by basilar atelectasis. There is a small hiatal hernia.      Impression:         1. Abnormal examination demonstrating large hematoma within the pelvic cul-de-sac extending into the adnexal regions bilaterally measuring 14.4 cm x 11 cm x 5.9 cm. Hematoma also infiltrates the lower anterior abdominal wall musculature measuring 12.6 cm  x 2 cm x 13.8 cm.  2. Postoperative changes of recent abdominal surgery with hematoma stranding in the subcutaneous fat of the lower anterior abdominal and pelvic wall as well as gas in the subcutaneous fat of the lower anterior and pelvic abdominal wall as well as in the  prevesical fat.  3. Nonobstructing renal stones bilaterally.  4. Small bilateral pleural effusions with bibasilar atelectasis.          Signer Name: Trey Fuentes MD   Signed: 2/1/2020 4:26 PM   Workstation Name: [email or web address]    [facility]          ECG/EMG Results (most recent)     None          Medication Review:   I have reviewed the patient's current medication list    Current Facility-Administered Medications:   •  diphenhydrAMINE (BENADRYL) capsule 25 mg, 25 mg, Oral, Q4H PRN **OR** diphenhydrAMINE (BENADRYL) injection 25 mg, 25 mg, Intravenous, Q4H PRN **OR** diphenhydrAMINE (BENADRYL) injection 25 mg, 25 mg, Intramuscular, Q4H PRN, Lizzy Villalobos, CRNA  •  docusate sodium (COLACE) capsule 100 mg, 100 mg, Oral, BID PRN, Riana Murray, , 100 mg at 02/01/20 0223  •  famotidine (PEPCID) tablet 20 mg, 20 mg, Oral, BID, Riana Murray DO, 20 mg at 02/02/20 0805  •  ferrous gluconate (FERGON) tablet 324 mg, 324 mg, Oral, Daily With Breakfast, Riana Murray, DO, 324 mg at 02/02/20 0805  •  labetalol (NORMODYNE) tablet 100 mg, 100 mg, Oral, Q12H, Riana Murray,  DO, 100 mg at 20 0805  •  lactated ringers infusion, 75 mL/hr, Intravenous, Continuous, Riana Murray DO, Last Rate: 75 mL/hr at 20 1040, 75 mL/hr at 20 1040  •  levoFLOXacin (LEVAQUIN) 500 mg/100 mL D5W (premix) (LEVAQUIN) 500 mg, 500 mg, Intravenous, Q24H, Riana Murray DO, Stopped at 20 2016  •  [COMPLETED] magnesium sulfate bolus from bag 0.04 g/mL 6 g, 6 g, Intravenous, Once, Last Rate: 300 mL/hr at 20 1805, 6 g at 20 1805 **FOLLOWED BY** magnesium sulfate 20 GM/500ML infusion, 2 g/hr, Intravenous, Continuous, Riana Murray DO, Last Rate: 50 mL/hr at 20 0134, 2 g/hr at 20 0134  •  metroNIDAZOLE (FLAGYL) 500 mg/100mL IVPB, 500 mg, Intravenous, Q8H, Riana Murray DO, 500 mg at 20 1124  •  oxyCODONE-acetaminophen (PERCOCET)  MG per tablet 1 tablet, 1 tablet, Oral, Q4H PRN, Riana Murray DO, 1 tablet at 20 1129  •  oxyCODONE-acetaminophen (PERCOCET) 5-325 MG per tablet 1 tablet, 1 tablet, Oral, Q4H PRN, Riana Murray DO, 1 tablet at 20 1451  •  simethicone (MYLICON) chewable tablet 80 mg, 80 mg, Oral, 4x Daily PRN, Riana Murray DO, 80 mg at 20 1553  •  sodium chloride 0.9 % infusion, 50 mL/hr, Intravenous, Continuous, George Hanson MD  •  witch hazel-glycerin (TUCKS) pad, , Topical, PRN, George Hanson MD      Assessment/Plan     19-year-old -1-0-2 status post  followed by uterine rupture and subsequent diagnostic laparoscopy with exploratory laparotomy and supracervical hysterectomy now with elevated BP, fever and large pelvic hematoma     1) PPD#4/POD#4:  Tolerating a general diet. Her pain is controlled. Andrea catheter in place. Bed rest with SCDs     2) Elevated blood pressure/Suspected severe preeclampsia:  Patient's blood pressures are much improved after being started on magnesium sulfate.  Patient's magnesium sulfate will continue until 6 PM tonight which is 24 hours  of magnesium sulfate therapy.  Her magnesium is therapeutic at a level of 5.4.  Patient has been started on labetalol 100 mg p.o. twice daily and that will be continued even after the magnesium is stopped.  24-hour urine will be pending to confirm diagnosis.     3) fever: Etiology of fever is unknown but likely due to the hematoma.    IV Flagyl and Levaquin have been started.  Patient's last fever was 100.8 at 23:34 2/1/20. WBC 8K.    4) pelvic hematoma: Large pelvic hematoma noted on CT scan today.  Patient is asymptomatic with the hematoma.  She denies any pain.  Serial H&H's were performed throughout the night and her hemoglobin has remained stable.  Hematology was consulted last night and they recommended a PFA-100 test to evaluate the patient's platelet function.  That test has returned and is mildly abnormal but not abnormal enough for patient to need a platelet transfusion.  Ibuprofen has been discontinued.  Fibrinogen, PT/PTT are within normal limits.  Patient's platelets last night were 202.  On physical exam today the hematoma does not appear to be expanding.  The hematoma appears to not be as firm as yesterday and the area is softening to palpation.  The patient denies any pain.     5) Acute blood loss anemia: In total patient lost approximately 4 L of blood from delivery and the supracervical hysterectomy.  She has received 6 units of packed red blood cells and 4 units of FFP.  Hemoglobin has improved to 9.0     6) thrombocytopenia: Improving. Pt's platelets are now 202.     The plan for the patient was thoroughly explained to patient and father of the baby via  again this am. I explained that pt is improving compared to yesterday and that at this point we will continue with magnesium sulfate for 24 hrs and then discontinue. Pt will stay on IV antibiotics until at least 24hrs afebrile.  All questions were answered.       Riana Murray DO  02/02/20  11:41 AM

## 2020-02-02 NOTE — PLAN OF CARE
Problem: Patient Care Overview  Goal: Plan of Care Review  Outcome: Ongoing (interventions implemented as appropriate)  Flowsheets  Taken 2/2/2020 0425  Progress: improving  Outcome Summary: VSS, blood pressures controlled, magnesium infusing, 24 hr urine continued, hematoma slight increase, pt given tylenol for headache, lab results improving  Taken 2/1/2020 2052  Plan of Care Reviewed With: patient;spouse

## 2020-02-02 NOTE — NURSING NOTE
This RN spoke to MD (Dr. Murray) about pt blood pressures being elevated to 140s/100s. Other vital signs stable. No blurred vision, headaches or dizziness. MD stated to call if BP's reached 160s over 100s.

## 2020-02-02 NOTE — NURSING NOTE
This RN spoke to Dr. Murray via phone call. This RN informed MD of elevated pt blood pressures (140s/100s) and hemoglobin lab results being 7.7. MD stated she was not worried at this time and to call if blood pressures went over 160/100's.

## 2020-02-02 NOTE — NURSING NOTE
RN spoke to MD about pt temperature being elevated at 102.1 and hematoma being slightly larger than several hours ago. See Orders for tylenol. Lab at bedside for blood draw.

## 2020-02-02 NOTE — NURSING NOTE
IV magnesium and antibiotics were infusing into left side IV. Due to multiple unsuccessful lab sticks on right arm, infusions were switched over to right side IV to allow for lab sticks on left side at a later time.

## 2020-02-03 LAB
ABO + RH BLD: NORMAL
ABO + RH BLD: NORMAL
BACTERIA UR QL AUTO: ABNORMAL /HPF
BH BB BLOOD EXPIRATION DATE: NORMAL
BH BB BLOOD EXPIRATION DATE: NORMAL
BH BB BLOOD TYPE BARCODE: 5100
BH BB BLOOD TYPE BARCODE: 5100
BH BB DISPENSE STATUS: NORMAL
BH BB DISPENSE STATUS: NORMAL
BH BB PRODUCT CODE: NORMAL
BH BB PRODUCT CODE: NORMAL
BH BB UNIT NUMBER: NORMAL
BH BB UNIT NUMBER: NORMAL
BILIRUB UR QL STRIP: NEGATIVE
CLARITY UR: ABNORMAL
COLOR UR: ABNORMAL
DEPRECATED RDW RBC AUTO: 45.8 FL (ref 37–54)
ERYTHROCYTE [DISTWIDTH] IN BLOOD BY AUTOMATED COUNT: 14.3 % (ref 12.3–15.4)
GLUCOSE UR STRIP-MCNC: NEGATIVE MG/DL
HCT VFR BLD AUTO: 22.3 % (ref 34–46.6)
HGB BLD-MCNC: 7.6 G/DL (ref 12–15.9)
HGB UR QL STRIP.AUTO: ABNORMAL
HYALINE CASTS UR QL AUTO: ABNORMAL /LPF
KETONES UR QL STRIP: NEGATIVE
LEUKOCYTE ESTERASE UR QL STRIP.AUTO: ABNORMAL
MCH RBC QN AUTO: 30.6 PG (ref 26.6–33)
MCHC RBC AUTO-ENTMCNC: 34.1 G/DL (ref 31.5–35.7)
MCV RBC AUTO: 89.9 FL (ref 79–97)
NITRITE UR QL STRIP: POSITIVE
PH UR STRIP.AUTO: 8.5 [PH] (ref 4.5–8)
PLATELET # BLD AUTO: 221 10*3/MM3 (ref 140–450)
PMV BLD AUTO: 10.1 FL (ref 6–12)
PROT UR QL STRIP: ABNORMAL
RBC # BLD AUTO: 2.48 10*6/MM3 (ref 3.77–5.28)
RBC # UR: ABNORMAL /HPF
REF LAB TEST METHOD: ABNORMAL
SP GR UR STRIP: 1.02 (ref 1–1.03)
SQUAMOUS #/AREA URNS HPF: ABNORMAL /HPF
UNIT  ABO: NORMAL
UNIT  ABO: NORMAL
UNIT  RH: NORMAL
UNIT  RH: NORMAL
UROBILINOGEN UR QL STRIP: ABNORMAL
WBC NRBC COR # BLD: 6.46 10*3/MM3 (ref 3.4–10.8)
WBC UR QL AUTO: ABNORMAL /HPF

## 2020-02-03 PROCEDURE — 85027 COMPLETE CBC AUTOMATED: CPT | Performed by: OBSTETRICS & GYNECOLOGY

## 2020-02-03 PROCEDURE — 25010000002 LEVOFLOXACIN PER 250 MG: Performed by: OBSTETRICS & GYNECOLOGY

## 2020-02-03 PROCEDURE — 99024 POSTOP FOLLOW-UP VISIT: CPT | Performed by: NURSE PRACTITIONER

## 2020-02-03 PROCEDURE — 87086 URINE CULTURE/COLONY COUNT: CPT | Performed by: OBSTETRICS & GYNECOLOGY

## 2020-02-03 PROCEDURE — 81001 URINALYSIS AUTO W/SCOPE: CPT | Performed by: OBSTETRICS & GYNECOLOGY

## 2020-02-03 PROCEDURE — 25010000002 HYDRALAZINE PER 20 MG

## 2020-02-03 RX ORDER — LEVOFLOXACIN 5 MG/ML
500 INJECTION, SOLUTION INTRAVENOUS EVERY 24 HOURS
Status: DISCONTINUED | OUTPATIENT
Start: 2020-02-03 | End: 2020-02-05

## 2020-02-03 RX ORDER — LABETALOL 100 MG/1
100 TABLET, FILM COATED ORAL ONCE
Status: COMPLETED | OUTPATIENT
Start: 2020-02-03 | End: 2020-02-03

## 2020-02-03 RX ORDER — NITROFURANTOIN 25; 75 MG/1; MG/1
100 CAPSULE ORAL EVERY 12 HOURS SCHEDULED
Status: DISCONTINUED | OUTPATIENT
Start: 2020-02-03 | End: 2020-02-03

## 2020-02-03 RX ORDER — ACETAMINOPHEN 500 MG
500 TABLET ORAL ONCE
Status: COMPLETED | OUTPATIENT
Start: 2020-02-03 | End: 2020-02-03

## 2020-02-03 RX ORDER — LABETALOL 100 MG/1
TABLET, FILM COATED ORAL
Status: COMPLETED
Start: 2020-02-03 | End: 2020-02-03

## 2020-02-03 RX ORDER — L.ACID,PARA/B.BIFIDUM/S.THERM 8B CELL
1 CAPSULE ORAL DAILY
Status: DISCONTINUED | OUTPATIENT
Start: 2020-02-03 | End: 2020-02-07 | Stop reason: HOSPADM

## 2020-02-03 RX ORDER — HYDRALAZINE HYDROCHLORIDE 20 MG/ML
INJECTION INTRAMUSCULAR; INTRAVENOUS
Status: COMPLETED
Start: 2020-02-03 | End: 2020-02-03

## 2020-02-03 RX ORDER — HYDRALAZINE HYDROCHLORIDE 20 MG/ML
5 INJECTION INTRAMUSCULAR; INTRAVENOUS ONCE
Status: COMPLETED | OUTPATIENT
Start: 2020-02-03 | End: 2020-02-03

## 2020-02-03 RX ORDER — LABETALOL 100 MG/1
200 TABLET, FILM COATED ORAL EVERY 12 HOURS SCHEDULED
Status: DISCONTINUED | OUTPATIENT
Start: 2020-02-03 | End: 2020-02-07 | Stop reason: HOSPADM

## 2020-02-03 RX ADMIN — FERROUS GLUCONATE TAB 324 MG (37.5 MG ELEMENTAL IRON) 324 MG: 324 (37.5 FE) TAB at 08:50

## 2020-02-03 RX ADMIN — LABETALOL HYDROCHLORIDE 200 MG: 100 TABLET, FILM COATED ORAL at 08:50

## 2020-02-03 RX ADMIN — METRONIDAZOLE 500 MG: 500 INJECTION, SOLUTION INTRAVENOUS at 20:59

## 2020-02-03 RX ADMIN — OXYCODONE HYDROCHLORIDE AND ACETAMINOPHEN 1 TABLET: 5; 325 TABLET ORAL at 00:32

## 2020-02-03 RX ADMIN — FAMOTIDINE 20 MG: 20 TABLET ORAL at 08:50

## 2020-02-03 RX ADMIN — HYDRALAZINE HYDROCHLORIDE 5 MG: 20 INJECTION INTRAMUSCULAR; INTRAVENOUS at 01:03

## 2020-02-03 RX ADMIN — LEVOFLOXACIN 500 MG: 500 INJECTION, SOLUTION INTRAVENOUS at 19:54

## 2020-02-03 RX ADMIN — ACETAMINOPHEN 500 MG: 500 TABLET, FILM COATED ORAL at 01:07

## 2020-02-03 RX ADMIN — LABETALOL HYDROCHLORIDE 200 MG: 100 TABLET, FILM COATED ORAL at 20:59

## 2020-02-03 RX ADMIN — Medication 1 CAPSULE: at 08:50

## 2020-02-03 RX ADMIN — METRONIDAZOLE 500 MG: 500 INJECTION, SOLUTION INTRAVENOUS at 12:12

## 2020-02-03 RX ADMIN — NITROFURANTOIN MONOHYDRATE/MACROCRYSTALLINE 100 MG: 25; 75 CAPSULE ORAL at 14:27

## 2020-02-03 RX ADMIN — FAMOTIDINE 20 MG: 20 TABLET ORAL at 20:59

## 2020-02-03 RX ADMIN — LABETALOL HYDROCHLORIDE 100 MG: 100 TABLET, FILM COATED ORAL at 01:03

## 2020-02-03 RX ADMIN — OXYCODONE HYDROCHLORIDE AND ACETAMINOPHEN 1 TABLET: 10; 325 TABLET ORAL at 11:33

## 2020-02-03 RX ADMIN — LABETALOL 100 MG: 100 TABLET, FILM COATED ORAL at 01:03

## 2020-02-03 RX ADMIN — METRONIDAZOLE 500 MG: 500 INJECTION, SOLUTION INTRAVENOUS at 04:25

## 2020-02-03 NOTE — PLAN OF CARE
"  Problem: Patient Care Overview  Goal: Plan of Care Review  Outcome: Ongoing (interventions implemented as appropriate)  Flowsheets  Taken 2/3/2020 1706  Progress: improving  Outcome Summary: Pt VSS stable, pt started on macrobid today for UTI.  Pt up ab amparo and showered today. IV C/D/I and flushing well.  Taken 2/3/2020 0900  Plan of Care Reviewed With: patient;spouse  Goal: Individualization and Mutuality  Outcome: Ongoing (interventions implemented as appropriate)  Flowsheets (Taken 2/3/2020 1706)  How to Address Anxieties/Fears: \"support\"  How Would You and/or Your Support Person Like to Participate in Your Care?: spouse  What Anxieties, Fears, Concerns, or Questions Do You Have About Your Care?: \"I  just want to feel better\"  Patient Specific Preferences: Pt up ambulating in room, using incentive spirometer every hour x 10 while awake, pt showered today  Goal: Discharge Needs Assessment  Outcome: Ongoing (interventions implemented as appropriate)  Flowsheets  Taken 1/31/2020 1841 by Deepthi Peña, RN  Equipment Needed After Discharge: none  Anticipated Changes Related to Illness: none  Readmission Within the Last 30 Days: no previous admission in last 30 days  Taken 1/29/2020 0526 by Zaria Kenney, TOBY  Equipment Currently Used at Home: none  Transportation Anticipated: car, drives self;family or friend will provide  Transportation Concerns: car, none  Patient/Family Anticipated Services at Transition: none  Patient/Family Anticipates Transition to: home with family  Taken 2/1/2020 1834 by Pallavi Mac, RN  Concerns to be Addressed: no discharge needs identified  Goal: Interprofessional Rounds/Family Conf  Outcome: Ongoing (interventions implemented as appropriate)     Problem: Postpartum (Vaginal Delivery) (Adult,Obstetrics,Pediatric)  Goal: Signs and Symptoms of Listed Potential Problems Will be Absent, Minimized or Managed (Postpartum)  Outcome: Ongoing (interventions implemented as " "appropriate)  Flowsheets (Taken 2/1/2020 1834 by Pallavi Mac, RN)  Problems Assessed (Postpartum Vaginal Delivery): all  Problems Present (Postpartum Vag Deliv): none     Problem: Hysterectomy (Adult)  Goal: Signs and Symptoms of Listed Potential Problems Will be Absent, Minimized or Managed (Hysterectomy)  Outcome: Ongoing (interventions implemented as appropriate)  Goal: Anesthesia/Sedation Recovery  Outcome: Ongoing (interventions implemented as appropriate)     Problem: Skin Injury Risk (Adult)  Goal: Identify Related Risk Factors and Signs and Symptoms  Outcome: Ongoing (interventions implemented as appropriate)  Goal: Skin Health and Integrity  Outcome: Ongoing (interventions implemented as appropriate)  Flowsheets (Taken 2/3/2020 1706)  Skin Health and Integrity: making progress toward outcome     Problem: Fall Risk (Adult)  Goal: Identify Related Risk Factors and Signs and Symptoms  Outcome: Ongoing (interventions implemented as appropriate)  Goal: Absence of Fall  Outcome: Ongoing (interventions implemented as appropriate)     Problem: Hypertensive Disorders in Pregnancy (Adult,Obstetrics,Pediatric)  Goal: Signs and Symptoms of Listed Potential Problems Will be Absent, Minimized or Managed (Hypertensive Disorders in Pregnancy)  Outcome: Ongoing (interventions implemented as appropriate)     Problem: Depression (Adult,Obstetrics,Pediatric)  Goal: Identify Related Risk Factors and Signs and Symptoms  Outcome: Ongoing (interventions implemented as appropriate)  Flowsheets (Taken 2/3/2020 1706)  Related Risk Factors (Depression): length of hospitalization; pregnancy/postpartum  Signs and Symptoms (Depression): other (see comments)  Note:   Pt tearful at times, states \" I just want to feel better to take care of my children\", [place] scale completed and scored a 6. PPD discussed today with  services, pt and spouse asked questions and all answered. Pt encouraged to discuss needs with spouse and " to discuss with nursing staff or MD. Pt states understanding.  Goal: Establish/Maintain Self-Care  Outcome: Ongoing (interventions implemented as appropriate)  Flowsheets (Taken 2/3/2020 1706)  Establish/Maintain Self-Care: making progress toward outcome  Goal: Improved/Stable Mood  Outcome: Ongoing (interventions implemented as appropriate)  Flowsheets (Taken 2/3/2020 1706)  Improved/Stable Mood: making progress toward outcome

## 2020-02-03 NOTE — NURSING NOTE
Pt notified that she will need to pump and dump for 24 hours after receiving Flagyl antibiotic. Follow up pediatrician phoned to notify and waiting for a return call. Pt informed using interpretur services. All questions answered and pt and spouse given reassurance. Pt denies needs at this time. Will cont to assess.

## 2020-02-03 NOTE — NURSING NOTE
"Pt. Tearful, states \" I just want to feel better, I am not sad\", via interpretor ipad.  Pt hematoma above LTI, slightly larger than previous night, LONI Wellington and  at bedside and state understanding. Hematoma is marked at this time. No bleeding noted on andrea pad at this time.  Small amount of yellow discharge noted on peripad, no foul ordor noted.  Pt denies pain during urination.  Pt denies pain, pt denies needs at this time. Pt states she would like to take shower at lunch time today.  Pt asked to report increase of discharge or change in color or smell, headache, increase abdominal pain, chills or body aches. Pt states understanding. Will cont to assess.   "

## 2020-02-03 NOTE — NURSING NOTE
"LONI Wellington asks this nurse to do in depth PPD teaching. [place] PPD scale in [place]. Use of interpretor service at this time. Pt reports \" i'm not sad, I just want to feel better\". Pt given encouragement and support and notified to let this nurse know after she and her  discuss use of medication and will notify LONI Wellington. Pt states understanding. Will cont to assess.    "

## 2020-02-03 NOTE — PLAN OF CARE
Problem: Patient Care Overview  Goal: Plan of Care Review  Outcome: Ongoing (interventions implemented as appropriate)  Flowsheets  Taken 2/3/2020 9594  Progress: improving  Outcome Summary: Pt VSS after adjusting labetolol and hydralazine doses, labs normal, tylenol for slight fever, pt up ad amparo, recieving antibiotics  Taken 2/3/2020 0113  Plan of Care Reviewed With: patient;spouse

## 2020-02-03 NOTE — NURSING NOTE
Phoned  in regard to pt. Orders received for antibiotics, see order and MAR. Dr. Ybarra notified about blood pressure, no new orders received. Will cont to assess.

## 2020-02-03 NOTE — NURSING NOTE
Phoned  regarding pt bp. Dr. Hanson states her bp is good and meds are working. No new orders given.  Pt denies epigastric pain, ha or right sided pain.Will cont to assess.

## 2020-02-03 NOTE — NURSING NOTE
Breast pump given to pt and education for use and set up. Pt states understanding. Breast pump set up and ready for use, pt states she will use the breast pump and call out prn with questions.  Will cont to assess.

## 2020-02-03 NOTE — NURSING NOTE
Pt given ice pack for abdomen.  Pt given new incentive spirometer, hers was thrown away in a room move. Pt states understanding for use and states she will complete every hour 10x while awake. Pt denies needs at this time. Will cont to assess.

## 2020-02-04 LAB
BASOPHILS # BLD AUTO: 0.01 10*3/MM3 (ref 0–0.2)
BASOPHILS NFR BLD AUTO: 0.1 % (ref 0–1.5)
DEPRECATED RDW RBC AUTO: 47.2 FL (ref 37–54)
EOSINOPHIL # BLD AUTO: 0.06 10*3/MM3 (ref 0–0.4)
EOSINOPHIL NFR BLD AUTO: 0.9 % (ref 0.3–6.2)
ERYTHROCYTE [DISTWIDTH] IN BLOOD BY AUTOMATED COUNT: 14.6 % (ref 12.3–15.4)
HCT VFR BLD AUTO: 24.9 % (ref 34–46.6)
HGB BLD-MCNC: 8.4 G/DL (ref 12–15.9)
IMM GRANULOCYTES # BLD AUTO: 0.08 10*3/MM3 (ref 0–0.05)
IMM GRANULOCYTES NFR BLD AUTO: 1.2 % (ref 0–0.5)
LYMPHOCYTES # BLD AUTO: 1.24 10*3/MM3 (ref 0.7–3.1)
LYMPHOCYTES NFR BLD AUTO: 17.9 % (ref 19.6–45.3)
MCH RBC QN AUTO: 30.4 PG (ref 26.6–33)
MCHC RBC AUTO-ENTMCNC: 33.7 G/DL (ref 31.5–35.7)
MCV RBC AUTO: 90.2 FL (ref 79–97)
MONOCYTES # BLD AUTO: 0.78 10*3/MM3 (ref 0.1–0.9)
MONOCYTES NFR BLD AUTO: 11.3 % (ref 5–12)
NEUTROPHILS # BLD AUTO: 4.76 10*3/MM3 (ref 1.7–7)
NEUTROPHILS NFR BLD AUTO: 68.6 % (ref 42.7–76)
NRBC BLD AUTO-RTO: 0 /100 WBC (ref 0–0.2)
PLATELET # BLD AUTO: 270 10*3/MM3 (ref 140–450)
PMV BLD AUTO: 9.6 FL (ref 6–12)
RBC # BLD AUTO: 2.76 10*6/MM3 (ref 3.77–5.28)
WBC NRBC COR # BLD: 6.93 10*3/MM3 (ref 3.4–10.8)

## 2020-02-04 PROCEDURE — 85025 COMPLETE CBC W/AUTO DIFF WBC: CPT | Performed by: NURSE PRACTITIONER

## 2020-02-04 PROCEDURE — 25010000002 LEVOFLOXACIN PER 250 MG: Performed by: OBSTETRICS & GYNECOLOGY

## 2020-02-04 PROCEDURE — 99024 POSTOP FOLLOW-UP VISIT: CPT | Performed by: NURSE PRACTITIONER

## 2020-02-04 RX ORDER — FERROUS GLUCONATE 324(37.5)
324 TABLET ORAL
Status: DISCONTINUED | OUTPATIENT
Start: 2020-02-05 | End: 2020-02-05 | Stop reason: SDUPTHER

## 2020-02-04 RX ADMIN — METRONIDAZOLE 500 MG: 500 INJECTION, SOLUTION INTRAVENOUS at 05:37

## 2020-02-04 RX ADMIN — Medication 1 CAPSULE: at 08:27

## 2020-02-04 RX ADMIN — FERROUS GLUCONATE TAB 324 MG (37.5 MG ELEMENTAL IRON) 324 MG: 324 (37.5 FE) TAB at 08:27

## 2020-02-04 RX ADMIN — LABETALOL HYDROCHLORIDE 200 MG: 100 TABLET, FILM COATED ORAL at 08:27

## 2020-02-04 RX ADMIN — LEVOFLOXACIN 500 MG: 500 INJECTION, SOLUTION INTRAVENOUS at 20:03

## 2020-02-04 RX ADMIN — OXYCODONE HYDROCHLORIDE AND ACETAMINOPHEN 1 TABLET: 5; 325 TABLET ORAL at 16:21

## 2020-02-04 RX ADMIN — METRONIDAZOLE 500 MG: 500 INJECTION, SOLUTION INTRAVENOUS at 13:07

## 2020-02-04 RX ADMIN — FAMOTIDINE 20 MG: 20 TABLET ORAL at 08:28

## 2020-02-04 RX ADMIN — OXYCODONE HYDROCHLORIDE AND ACETAMINOPHEN 1 TABLET: 5; 325 TABLET ORAL at 09:47

## 2020-02-04 RX ADMIN — LABETALOL HYDROCHLORIDE 200 MG: 100 TABLET, FILM COATED ORAL at 21:23

## 2020-02-04 RX ADMIN — METRONIDAZOLE 500 MG: 500 INJECTION, SOLUTION INTRAVENOUS at 21:24

## 2020-02-04 RX ADMIN — FAMOTIDINE 20 MG: 20 TABLET ORAL at 21:23

## 2020-02-04 NOTE — PLAN OF CARE
Problem: Patient Care Overview  Goal: Plan of Care Review  Flowsheets  Taken 2/4/2020 0515 by Pallavi Mac, RN  Progress: improving  Outcome Summary: vss, antibiotic therapy continues, up ad amparo, incision well approximated with no drainage, no c/o pain  Taken 2/4/2020 7955 by Inocencia Peres, RN  Plan of Care Reviewed With: patient;spouse

## 2020-02-04 NOTE — PAYOR COMM NOTE
"[facility]   &  [facility]  [address]    [address] [place] [address]    [place] [address]    Lila Armstrong  Utilization Review/Room Reservations  Phone: [phone], [phone] or [phone]  Fax: [phone]  Email: [email or web address]  Please call, fax back, or email with authorization or any questions! Thanks!      UPDATED CLINICAL  AUTH#[id number]          This fax contains any of the following:  Face Sheet, H&P, progress notes, consults, orders, meds, lab results, labor record, vitals, delivery worksheet, op note, d/c summary.  Elvi Clemente (19 y.o. Female)     Date of Birth Social Security Number Address Home Phone MRN    [id number]  [id number]  [facility] [address] [phone] [id number]    Muslim Marital Status          Unknown        Admission Date Admission Type Admitting Provider Attending Provider Department, Room/Bed    1/29/20 Elective Tomi Cooney MD Spaulding, Timothy Stewart, MD [facility] OB GYN, 1120/1    Discharge Date Discharge Disposition Discharge Destination                       Attending Provider:  Tomi Cooney MD    Allergies:  No Known Allergies    Isolation:  None   Infection:  None   Code Status:  CPR    Ht:  154.9 cm (61\")   Wt:  67.9 kg (149 lb 11.2 oz)    Admission Cmt:  None   Principal Problem:  Postpartum hemorrhage [O72.1] More...                 Active Insurance as of 1/29/2020     Primary Coverage     Payor Plan Insurance Group Employer/Plan Group    [place] [id number]     Payor Plan Address Payor Plan Phone Number Payor Plan Fax Number Effective Dates    [employer] Claims Office - [address] [address] [phone]  1/1/2020 - None Entered    [facility] [id number]       Subscriber Name Subscriber Birth Date Member ID       ELVI CLEMENTE 2000 [id number]                 Emergency Contacts      " "(Rel.) Home Phone Work Phone Mobile Phone    savage uribe (Spouse) [phone] -- --                      Physician Progress Notes (last 7 days) (Notes from 01/28/20 1758 through 02/04/20 1758)      AbdilashellIfeanyiMaríaSP Wyatt at 02/04/20 0905     Attestation signed by Enriqueta Ybarra MD at 02/04/20 1015    I have reviewed the documentation above and agree.  I have seen and examined the patient myself.  I am concerned that the right side of her hematoma is firmer.  Her fever curve is also trending upward, however, she has not had a clinically significant spike at this time.  We will continue IV antibiotics.  Discussed with case with Dr. Murray.  We will make her n.p.o. after midnight and if she has febrile episodes or changes on exam we will consider CT scan and possible exam under anesthesia and evacuation of superficial hematoma tomorrow.  Enriqueta Ybarra MD                      Patient Care Team:  System, Provider Not In as PCP - General        Chief Complaint:  PPD #6/POD #6    Subjective    Interval History and ROS:     Patient States She feels well. She reports having 3 bowel movements.   Patient Complaints: None.   Patient Denies:  Heavy bleeding, chills, uncontrolled pain.   History taken from: patient chart      Objective    Vital Signs  Temp:  [97.7 °F (36.5 °C)-100 °F (37.8 °C)] 99.1 °F (37.3 °C)  Heart Rate:  [] 107  Resp:  [16-18] 18  BP: (138-154)/(92-98) 146/98    Flowsheet Rows      First Filed Value   Admission Height  154.9 cm (61\") Documented at 01/29/2020 0525   Admission Weight  67.9 kg (149 lb 11.2 oz) Documented at 01/29/2020 0525          Physical Exam:  General Appearance: alert, pleasant, appears stated age, cooperative and pale  Lungs: clear to auscultation, respirations regular, respirations even and respirations unlabored  Abdomen: upper abodmen soft, non tender, bowel sounds present. Hematoma noted and borders are marked with pen. Hematoma moderately firm and warm " to touch. Incision C/D/I.   Extremities: moves extremities well, no edema, no tenderness and Bear's sign negative  Skin: no bleeding, bruising or rash and turgor normal  Neurologic: Mental Status orientated to person, place, time and situation  Psych: normal    Results Review:     I reviewed the patient's new clinical results.    Lab Results (last 24 hours)     Procedure Component Value Units Date/Time    CBC & Differential [[id number]] Collected:  02/04/20 0814    Specimen:  Blood Updated:  02/04/20 0824    Narrative:       The following orders were created for panel order CBC & Differential.  Procedure                               Abnormality         Status                     ---------                               -----------         ------                     CBC Auto Differential[[id number]]        Abnormal            Final result                 Please view results for these tests on the individual orders.    CBC Auto Differential [[id number]]  (Abnormal) Collected:  02/04/20 0814    Specimen:  Blood Updated:  02/04/20 0824     WBC 6.93 10*3/mm3      RBC 2.76 10*6/mm3      Hemoglobin 8.4 g/dL      Hematocrit 24.9 %      MCV 90.2 fL      MCH 30.4 pg      MCHC 33.7 g/dL      RDW 14.6 %      RDW-SD 47.2 fl      MPV 9.6 fL      Platelets 270 10*3/mm3      Neutrophil % 68.6 %      Lymphocyte % 17.9 %      Monocyte % 11.3 %      Eosinophil % 0.9 %      Basophil % 0.1 %      Immature Grans % 1.2 %      Neutrophils, Absolute 4.76 10*3/mm3      Lymphocytes, Absolute 1.24 10*3/mm3      Monocytes, Absolute 0.78 10*3/mm3      Eosinophils, Absolute 0.06 10*3/mm3      Basophils, Absolute 0.01 10*3/mm3      Immature Grans, Absolute 0.08 10*3/mm3      nRBC 0.0 /100 WBC     Urine Culture - Urine, Urine, Clean Catch [[id number]] Collected:  02/03/20 1134    Specimen:  Urine, Clean Catch Updated:  02/03/20 1619    Urinalysis, Microscopic Only - Urine, Clean Catch [[id number]]  (Abnormal) Collected:  02/03/20 1134    Specimen:   Urine, Clean Catch Updated:  02/03/20 1156     RBC, UA 31-50 /HPF      WBC, UA 3-5 /HPF      Bacteria, UA 1+ /HPF      Squamous Epithelial Cells, UA 7-12 /HPF      Hyaline Casts, UA None Seen /LPF      Methodology Manual Light Microscopy    Urinalysis With Microscopic If Indicated (No Culture) - Urine, Clean Catch [[id number]]  (Abnormal) Collected:  02/03/20 1134    Specimen:  Urine, Clean Catch Updated:  02/03/20 1145     Color, UA Dark Yellow     Appearance, UA Slightly Cloudy     pH, UA 8.5     Specific Gravity, UA 1.020     Glucose, UA Negative     Ketones, UA Negative     Bilirubin, UA Negative     Blood, UA Large (3+)     Protein, UA >=300 mg/dL (3+)     Leuk Esterase, UA Trace     Nitrite, UA Positive     Urobilinogen, UA 2.0 E.U./dL          Imaging Results (Last 24 Hours)     ** No results found for the last 24 hours. **          Xray not reviewed personally by physician.      ECG not reviewed personally by physician  ECG/EMG Results (most recent)     None          Medication Review:   I have reviewed the patient's current medication list    Current Facility-Administered Medications:   •  diphenhydrAMINE (BENADRYL) capsule 25 mg, 25 mg, Oral, Q4H PRN **OR** diphenhydrAMINE (BENADRYL) injection 25 mg, 25 mg, Intravenous, Q4H PRN **OR** diphenhydrAMINE (BENADRYL) injection 25 mg, 25 mg, Intramuscular, Q4H PRN, Lizzy Villalobos, CRNA  •  docusate sodium (COLACE) capsule 100 mg, 100 mg, Oral, BID PRN, Riana Murray, DO, 100 mg at 02/01/20 0223  •  famotidine (PEPCID) tablet 20 mg, 20 mg, Oral, BID, Riana Murray DO, 20 mg at 02/04/20 0828  •  ferrous gluconate (FERGON) tablet 324 mg, 324 mg, Oral, Daily With Breakfast, Riana Murray, DO, 324 mg at 02/04/20 0827  •  labetalol (NORMODYNE) tablet 200 mg, 200 mg, Oral, Q12H, Riana Murray DO, 200 mg at 02/04/20 0827  •  lactated ringers infusion, 75 mL/hr, Intravenous, Continuous, Riana Murray DO, Stopped at 02/02/20 2237  •  lactobacillus  acidophilus (RISAQUAD) capsule 1 capsule, 1 capsule, Oral, Daily, María Smith, APRN, 1 capsule at 02/04/20 0827  •  levoFLOXacin (LEVAQUIN) 500 mg/100 mL D5W (premix) (LEVAQUIN) 500 mg, 500 mg, Intravenous, Q24H, Enriqueta Ybarra MD, 500 mg at 02/03/20 1954  •  [COMPLETED] magnesium sulfate bolus from bag 0.04 g/mL 6 g, 6 g, Intravenous, Once, Last Rate: 300 mL/hr at 02/01/20 1805, 6 g at 02/01/20 1805 **FOLLOWED BY** magnesium sulfate 20 GM/500ML infusion, 2 g/hr, Intravenous, Continuous, Riana Murray DO, Stopped at 02/02/20 1805  •  metroNIDAZOLE (FLAGYL) 500 mg/100mL IVPB, 500 mg, Intravenous, Q8H, Enriqueta Ybarra MD, 500 mg at 02/04/20 0537  •  oxyCODONE-acetaminophen (PERCOCET)  MG per tablet 1 tablet, 1 tablet, Oral, Q4H PRN, Riana Murray DO, 1 tablet at 02/03/20 1133  •  oxyCODONE-acetaminophen (PERCOCET) 5-325 MG per tablet 1 tablet, 1 tablet, Oral, Q4H PRN, Riana Murray DO, 1 tablet at 02/03/20 0032  •  simethicone (MYLICON) chewable tablet 80 mg, 80 mg, Oral, 4x Daily PRN, Riana Murray DO, 80 mg at 01/31/20 1553  •  sodium chloride 0.9 % infusion, 50 mL/hr, Intravenous, Continuous, George Hanson MD  •  witch hazel-glycerin (TUCKS) pad, , Topical, PRN, George Hanson MD      Assessment/Plan     1) PPD#6/POD#6: She is ambulating and voiding without difficulty. She is tolerating a regular diet.  She denies pain. She had a bowel movment.      2) Elevated blood pressure/Suspected severe preeclampsia: Has completed 24 hrs magnesium sulfate. 24 hr urine= 385. Labetalol has been increased to 200mg BID.      3) Fever: Etiology of fever is unknown but likely due to the hematoma or UTI. Culture is pending. Temperature was noted to be 100 this AM. Pt denies chills or feeling feverish Pt is getting  IV Flagyl and Levaquin.  Will continue IV antibx d/t fluctuation of temperature.  WBC 6 this morning.     4) Pelvic hematoma: Large pelvic hematoma  "noted on CT scan. The borders of the hematoma appear stable within the border markings. The hematoma is moderately firm and warm to touch.  Patient is asymptomatic with the hematoma.  She denies any pain.  Dr. Ybarra at bedside to assess the hematoma. Will continue to watch the hematoma at this time, if continues to be an area of concern, will repeat CT scan tomorrow.      5) Acute blood loss anemia: In total patient lost approximately 4 L of blood from delivery and the supracervical hysterectomy.  She has received 6 units of packed red blood cells and 4 units of FFP.  Hemoglobin is steady at 8.4g/dL. She is taking oral iron.      6) Thrombocytopenia: Continues to improve. Pt's platelets are 270.     7) Urinary symptoms- UA nitrite +. Culture pending.  Pt on IV Levaquin.      8) Male infant- Breast feeding. Pt is pumping and dumping d/t Flagyl.     Plan for disposition:Possibly later this week, when deemed stable     SP Benavides  02/04/20  9:05 AM      Time: More than 50% of time spent in counseling and coordination of care:  Total face-to-face/floor time 30 min.  Time spent in counseling 20 min. Counseling included the following topics: plan of care, labs, medication, hematoma      Electronically signed by Enriqueta Ybarra MD at 02/04/20 1015     María Smith APRN at 02/03/20 0925          Patient Care Team:  System, Provider Not In as PCP - General        Chief Complaint: PPD/POD #5    Subjective    Interval History and ROS:     Patient States She feel much better. She reports scant bleeding.   Patient Complaints: Minimal abdominal pain. \"Pus\" in her urine.   Patient Denies:  HA, vision changes, uncontrolled pain  History taken from: patient chart    Ms. Clemente reports she is feeling fairly well today. She denies HA, vision changes, or uncontrolled pain. She reports urinating without difficulty. She had a bowel movement yesterday. She is tolerating a regular diet. Her pain is well " "controlled. She has minimal swelling. She is ambulating without difficulty. She denies feeling dizzy or lightheaded when ambulating. She is breastfeeding her infant. She denies uncontrolled abdominal pain.     Objective    Vital Signs  Temp:  [97.7 °F (36.5 °C)-100 °F (37.8 °C)] 98.7 °F (37.1 °C)  Heart Rate:  [] 87  Resp:  [16-18] 16  BP: (123-167)/() 138/95    Flowsheet Rows      First Filed Value   Admission Height  154.9 cm (61\") Documented at 01/29/2020 0525   Admission Weight  67.9 kg (149 lb 11.2 oz) Documented at 01/29/2020 0525          Physical Exam:  General Appearance: alert, pleasant, cooperative and pale  Lungs: clear to auscultation, respirations regular, respirations even and respirations unlabored  Abdomen: normal bowel sounds and soft, hematoma marked and is noted to expand slightly beyond marking. New marking outlining the hematolma placed by RN. Area warm to touch but soft. Incision C/D/I.  Extremities: moves extremities well, no edema, no tenderness and Bear's sign negative  Skin: no bleeding, bruising or rash and turgor normal  Neurologic: Mental Status orientated to person, place, time and situation  Psych: normal    Results Review:     I reviewed the patient's new clinical results.    Lab Results (last 24 hours)     Procedure Component Value Units Date/Time    Urine Culture - Urine, Urine, Clean Catch [[id number]] Collected:  02/03/20 1134    Specimen:  Urine, Clean Catch Updated:  02/03/20 1619    Urinalysis, Microscopic Only - Urine, Clean Catch [[id number]]  (Abnormal) Collected:  02/03/20 1134    Specimen:  Urine, Clean Catch Updated:  02/03/20 1156     RBC, UA 31-50 /HPF      WBC, UA 3-5 /HPF      Bacteria, UA 1+ /HPF      Squamous Epithelial Cells, UA 7-12 /HPF      Hyaline Casts, UA None Seen /LPF      Methodology Manual Light Microscopy    Urinalysis With Microscopic If Indicated (No Culture) - Urine, Clean Catch [[id number]]  (Abnormal) Collected:  02/03/20 1134    " Specimen:  Urine, Clean Catch Updated:  02/03/20 1145     Color, UA Dark Yellow     Appearance, UA Slightly Cloudy     pH, UA 8.5     Specific Gravity, UA 1.020     Glucose, UA Negative     Ketones, UA Negative     Bilirubin, UA Negative     Blood, UA Large (3+)     Protein, UA >=300 mg/dL (3+)     Leuk Esterase, UA Trace     Nitrite, UA Positive     Urobilinogen, UA 2.0 E.U./dL    CBC (No Diff) [[id number]]  (Abnormal) Collected:  02/03/20 0525    Specimen:  Blood Updated:  02/03/20 0556     WBC 6.46 10*3/mm3      RBC 2.48 10*6/mm3      Hemoglobin 7.6 g/dL      Hematocrit 22.3 %      MCV 89.9 fL      MCH 30.6 pg      MCHC 34.1 g/dL      RDW 14.3 %      RDW-SD 45.8 fl      MPV 10.1 fL      Platelets 221 10*3/mm3           Imaging Results (Last 24 Hours)     ** No results found for the last 24 hours. **          Xray not reviewed personally by physician.      ECG not reviewed personally by physician  ECG/EMG Results (most recent)     None          Medication Review:   I have reviewed the patient's current medication list    Current Facility-Administered Medications:   •  diphenhydrAMINE (BENADRYL) capsule 25 mg, 25 mg, Oral, Q4H PRN **OR** diphenhydrAMINE (BENADRYL) injection 25 mg, 25 mg, Intravenous, Q4H PRN **OR** diphenhydrAMINE (BENADRYL) injection 25 mg, 25 mg, Intramuscular, Q4H PRN, Lizzy Villalobos, CRNA  •  docusate sodium (COLACE) capsule 100 mg, 100 mg, Oral, BID PRN, Riana Murray, DO, 100 mg at 02/01/20 0223  •  famotidine (PEPCID) tablet 20 mg, 20 mg, Oral, BID, Riana Murray DO, 20 mg at 02/03/20 0850  •  ferrous gluconate (FERGON) tablet 324 mg, 324 mg, Oral, Daily With Breakfast, Riana Murray, , 324 mg at 02/03/20 0850  •  labetalol (NORMODYNE) tablet 200 mg, 200 mg, Oral, Q12H, Riana Murray DO, 200 mg at 02/03/20 0850  •  lactated ringers infusion, 75 mL/hr, Intravenous, Continuous, Riana Murray DO, Stopped at 02/02/20 2237  •  lactobacillus acidophilus (RISAQUAD) capsule 1  capsule, 1 capsule, Oral, Daily, Tingle, María VARGAS, APRN, 1 capsule at 02/03/20 0850  •  levoFLOXacin (LEVAQUIN) 500 mg/100 mL D5W (premix) (LEVAQUIN) 500 mg, 500 mg, Intravenous, Q24H, Enriqueta Ybarra MD, 500 mg at 02/03/20 1954  •  [COMPLETED] magnesium sulfate bolus from bag 0.04 g/mL 6 g, 6 g, Intravenous, Once, Last Rate: 300 mL/hr at 02/01/20 1805, 6 g at 02/01/20 1805 **FOLLOWED BY** magnesium sulfate 20 GM/500ML infusion, 2 g/hr, Intravenous, Continuous, Riana Murray DO, Stopped at 02/02/20 1805  •  metroNIDAZOLE (FLAGYL) 500 mg/100mL IVPB, 500 mg, Intravenous, Q8H, Enriqueta Ybarra MD  •  oxyCODONE-acetaminophen (PERCOCET)  MG per tablet 1 tablet, 1 tablet, Oral, Q4H PRN, Riana Murray DO, 1 tablet at 02/03/20 1133  •  oxyCODONE-acetaminophen (PERCOCET) 5-325 MG per tablet 1 tablet, 1 tablet, Oral, Q4H PRN, Raina Murray DO, 1 tablet at 02/03/20 0032  •  simethicone (MYLICON) chewable tablet 80 mg, 80 mg, Oral, 4x Daily PRN, Riana Murray DO, 80 mg at 01/31/20 1553  •  sodium chloride 0.9 % infusion, 50 mL/hr, Intravenous, Continuous, George Hanson MD  •  witch hazel-glycerin (TUCKS) pad, , Topical, PRN, George Hanson MD      Assessment/Plan     1) PPD#5/POD#5: Tolerating a regular diet. Her pain is controlled. She had a bowel movment. She is voiding without difficulty. She is ambulating.      2) Elevated blood pressure/Suspected severe preeclampsia: Has completed 24 hrs magnesium sulfate. BP still moderately high on Labetalol 100mg BID. 24 hr urine= 385.     3) Fever: Etiology of fever is unknown but likely due to the hematoma.  Pt is getting  IV Flagyl and Levaquin. Will add probiotic for gut health.  Patient's last fever was 100.8 at 23:34 2/1/20. WBC 6K.     4) Pelvic hematoma: Large pelvic hematoma noted on CT scan. The borders of the hematoma have expanded slightly and have been marked. Patient is asymptomatic with the hematoma.  She  "denies any pain.   Dr. Hanson at bedside to assess the hematoma. Will continue to watch the hematoma at this time.     5) Acute blood loss anemia: In total patient lost approximately 4 L of blood from delivery and the supracervical hysterectomy.  She has received 6 units of packed red blood cells and 4 units of FFP.  Hemoglobin is steady at 7.6g/dL.    6) Thrombocytopenia: Continues to improve. Pt's platelets are now 2 21.    7) Urinary symptoms- Check UA.     8) Male infant- Breast feeding. Pt is pumping and dumping d/t Flagyl.     Plan for disposition: Possibly later this week when deemed stable    SP Benavides  02/03/20  8:25 PM      Time: More than 50% of time spent in counseling and coordination of care:  Total face-to-face/floor time 30 min.  Time spent in counseling 15 min. Counseling included the following topics: plan of care, medication, labs, etc.      Electronically signed by María Smith APRN at 02/04/20 0905     Riana Murray DO at 02/02/20 1140          Patient Care Team:  System, Provider Not In as PCP - General        Chief Complaint:  PPD/POD#4    Subjective  Patient reports that she is feeling better today.  She has been having a headache but she describes it as not too painful.  She reports that she had a similar headache when she was on magnesium sulfate after delivery of her first child.  Otherwise, patient has no complaints.  She is not reporting any abdominal discomfort.  She has not required any pain meds overnight.  She is tolerating a general diet.  She was reporting flatus.  Patient is currently on bedrest with a Andrea catheter in place with SCD boots on.  Patient is breast-feeding.      Objective    Vital Signs  Temp:  [98.1 °F (36.7 °C)-102.5 °F (39.2 °C)] 98.1 °F (36.7 °C)  Heart Rate:  [] 84  Resp:  [18] 18  BP: (117-167)/() 137/91    Flowsheet Rows      First Filed Value   Admission Height  154.9 cm (61\") Documented at 01/29/2020 0525   Admission " Weight  67.9 kg (149 lb 11.2 oz) Documented at 01/29/2020 0525          Physical Exam:  Physical Exam   Constitutional: She is oriented to person, place, and time. She appears well-developed and well-nourished. No distress.   Cardiovascular: Normal rate, regular rhythm and normal heart sounds.   Pulmonary/Chest: Effort normal and breath sounds normal. No stridor. No respiratory distress. She has no wheezes. She has no rales.   Abdominal: Soft. She exhibits no distension. There is no tenderness. There is no guarding.   Area where hematoma was marked is stable. The area is not as firm to palpation as yesterday. Abdominal exam is benign- pt denies tenderness. Incision healing well.   Musculoskeletal: Normal range of motion. She exhibits no edema or tenderness.   Neurological: She is alert and oriented to person, place, and time. No cranial nerve deficit. Coordination normal.   Skin: Skin is warm. She is not diaphoretic. No erythema. No pallor.   Psychiatric: She has a normal mood and affect. Her behavior is normal. Judgment and thought content normal.   Vitals reviewed.      Results Review:     I reviewed the patient's new clinical results.    Lab Results (last 24 hours)     Procedure Component Value Units Date/Time    Magnesium [[id number]]  (Abnormal) Collected:  02/02/20 0757    Specimen:  Blood Updated:  02/02/20 0830     Magnesium 5.4 mg/dL     Hemoglobin & Hematocrit, Blood [[id number]]  (Abnormal) Collected:  02/02/20 0757    Specimen:  Blood Updated:  02/02/20 0812     Hemoglobin 8.5 g/dL      Hematocrit 24.5 %     Platelet Function Test [[id number]]  (Abnormal) Collected:  02/02/20 0358    Specimen:  Blood Updated:  02/02/20 0611     Collagen/Epinephrine 208 Seconds      Collagen/ Seconds     Narrative:       PFA Interpretation    Norm ADP / Norm EPI: Normal Plt Function                                                                                                                  Norm ADP / Abn EPI: Drug  Induced Platelet Dysfunction, most commonly seen with Aspirin                              Abn ADP / Abn EPI: Abnormal Platelet Function, recommend further evaluation    Magnesium [[id number]]  (Abnormal) Collected:  02/02/20 0358    Specimen:  Blood Updated:  02/02/20 0503     Magnesium 5.2 mg/dL     Hemoglobin & Hematocrit, Blood [[id number]]  (Abnormal) Collected:  02/02/20 0358    Specimen:  Blood Updated:  02/02/20 0418     Hemoglobin 8.1 g/dL      Hematocrit 23.7 %     Magnesium [[id number]]  (Abnormal) Collected:  02/01/20 2230    Specimen:  Blood Updated:  02/01/20 2256     Magnesium 4.2 mg/dL     Fibrinogen [[id number]]  (Abnormal) Collected:  02/01/20 2230    Specimen:  Blood Updated:  02/01/20 2253     Fibrinogen 600 mg/dL     CBC (No Diff) [[id number]]  (Abnormal) Collected:  02/01/20 2230    Specimen:  Blood Updated:  02/01/20 2242     WBC 8.19 10*3/mm3      RBC 2.98 10*6/mm3      Hemoglobin 9.0 g/dL      Hematocrit 26.4 %      MCV 88.6 fL      MCH 30.2 pg      MCHC 34.1 g/dL      RDW 13.9 %      RDW-SD 44.2 fl      MPV 10.7 fL      Platelets 202 10*3/mm3     Hemoglobin & Hematocrit, Blood [[id number]]  (Abnormal) Collected:  02/01/20 2230    Specimen:  Blood Updated:  02/01/20 2242     Hemoglobin 9.0 g/dL      Hematocrit 26.4 %     Magnesium [[id number]]  (Abnormal) Collected:  02/01/20 1849    Specimen:  Blood Updated:  02/01/20 1919     Magnesium 4.4 mg/dL     aPTT [[id number]]  (Normal) Collected:  02/01/20 1849    Specimen:  Blood Updated:  02/01/20 1918     PTT 27.0 seconds     Narrative:       PTT = The equivalent PTT values for the therapeutic range of heparin levels at 0.1 to 0.7 U/ml are 53 to 110 seconds.      Protime-INR [[id number]]  (Normal) Collected:  02/01/20 1849    Specimen:  Blood Updated:  02/01/20 1918     Protime 12.4 Seconds      INR 0.95    Narrative:       Therapeutic Ranges for INR: 2.0-3.0 (PT 20-30)                              2.5-3.5 (PT 25-34)    CBC & Differential [[id number]]  Collected:  02/01/20 1523    Specimen:  Blood Updated:  02/01/20 1543    Narrative:       The following orders were created for panel order CBC & Differential.  Procedure                               Abnormality         Status                     ---------                               -----------         ------                     CBC Auto Differential[[id number]]        Abnormal            Final result                 Please view results for these tests on the individual orders.    CBC Auto Differential [[id number]]  (Abnormal) Collected:  02/01/20 1523    Specimen:  Blood Updated:  02/01/20 1543     WBC 8.48 10*3/mm3      RBC 2.82 10*6/mm3      Hemoglobin 8.7 g/dL      Hematocrit 25.1 %      MCV 89.0 fL      MCH 30.9 pg      MCHC 34.7 g/dL      RDW 14.0 %      RDW-SD 44.7 fl      MPV 10.9 fL      Platelets 158 10*3/mm3      Neutrophil % 83.3 %      Lymphocyte % 9.1 %      Monocyte % 5.9 %      Eosinophil % 0.8 %      Basophil % 0.2 %      Immature Grans % 0.7 %      Neutrophils, Absolute 7.06 10*3/mm3      Lymphocytes, Absolute 0.77 10*3/mm3      Monocytes, Absolute 0.50 10*3/mm3      Eosinophils, Absolute 0.07 10*3/mm3      Basophils, Absolute 0.02 10*3/mm3      Immature Grans, Absolute 0.06 10*3/mm3      nRBC 0.0 /100 WBC           Imaging Results (Last 24 Hours)     Procedure Component Value Units Date/Time    CT Abdomen Pelvis Without Contrast [[id number]] Collected:  02/01/20 1626     Updated:  02/01/20 1628    Narrative:       CT Abdomen Pelvis WO 2/1/2020    INDICATION:   3 days postpartum with right lower quadrant abdominal pain and bruising. Uterine rupture, postpartum hemorrhage followed by hysterectomy.    TECHNIQUE:   CT of the abdomen and pelvis without IV contrast. Coronal and sagittal reconstructions were obtained.  Radiation dose reduction techniques included automated exposure control or exposure modulation based on body size. Count of known CT and cardiac nuc  med studies performed in previous 12  months: 0.     COMPARISON:   None available.    FINDINGS:  Abdomen: The liver, spleen, pancreas, gallbladder and biliary tree and adrenal glands are normal. There are nonobstructing renal stones bilaterally.    Pelvis: The gut is normal in appearance. The appendix is not definitely visualized. There are postoperative changes of recent hysterectomy with hyperdense stranding in the subcutaneous fat of the lower anterior abdominal and pelvic wall characteristic of  hematoma. There is gas seen within the subcutaneous fat of the anterior pelvic wall and in the peritoneal cavity in the prevesical fat from recent surgery. There is a large hematoma within the pelvis measuring approximately 14.4 cm x 5.9 cm x 11 cm  located along the posterior aspect of the urinary bladder and running anterior to the distal colon and rectum. The hematoma extends into the adnexal regions bilaterally. There is mass effect on the urinary bladder posteriorly. Hematoma also infiltrates  the lower anterior abdominal wall musculature measuring approximately 12.6 cm x 2 cm by approximately 13.8 cm. The findings were called to the patient's nurse at 4:20 PM on 2/1/2020. Images of the lung bases demonstrate small bilateral pleural effusions  with by basilar atelectasis. There is a small hiatal hernia.      Impression:         1. Abnormal examination demonstrating large hematoma within the pelvic cul-de-sac extending into the adnexal regions bilaterally measuring 14.4 cm x 11 cm x 5.9 cm. Hematoma also infiltrates the lower anterior abdominal wall musculature measuring 12.6 cm  x 2 cm x 13.8 cm.  2. Postoperative changes of recent abdominal surgery with hematoma stranding in the subcutaneous fat of the lower anterior abdominal and pelvic wall as well as gas in the subcutaneous fat of the lower anterior and pelvic abdominal wall as well as in the  prevesical fat.  3. Nonobstructing renal stones bilaterally.  4. Small bilateral pleural effusions with  bibasilar atelectasis.          Signer Name: rTey Fuentes MD   Signed: 2/1/2020 4:26 PM   Workstation Name: [id number]    [facility]          ECG/EMG Results (most recent)     None          Medication Review:   I have reviewed the patient's current medication list    Current Facility-Administered Medications:   •  diphenhydrAMINE (BENADRYL) capsule 25 mg, 25 mg, Oral, Q4H PRN **OR** diphenhydrAMINE (BENADRYL) injection 25 mg, 25 mg, Intravenous, Q4H PRN **OR** diphenhydrAMINE (BENADRYL) injection 25 mg, 25 mg, Intramuscular, Q4H PRN, Lizzy Villalobos, CRNA  •  docusate sodium (COLACE) capsule 100 mg, 100 mg, Oral, BID PRN, Riana Murray DO, 100 mg at 02/01/20 0223  •  famotidine (PEPCID) tablet 20 mg, 20 mg, Oral, BID, Riana Murray DO, 20 mg at 02/02/20 0805  •  ferrous gluconate (FERGON) tablet 324 mg, 324 mg, Oral, Daily With Breakfast, Riana Murray DO, 324 mg at 02/02/20 0805  •  labetalol (NORMODYNE) tablet 100 mg, 100 mg, Oral, Q12H, Riana Murray DO, 100 mg at 02/02/20 0805  •  lactated ringers infusion, 75 mL/hr, Intravenous, Continuous, Riana Murray DO, Last Rate: 75 mL/hr at 02/02/20 1040, 75 mL/hr at 02/02/20 1040  •  levoFLOXacin (LEVAQUIN) 500 mg/100 mL D5W (premix) (LEVAQUIN) 500 mg, 500 mg, Intravenous, Q24H, Riana Murray DO, Stopped at 02/01/20 2016  •  [COMPLETED] magnesium sulfate bolus from bag 0.04 g/mL 6 g, 6 g, Intravenous, Once, Last Rate: 300 mL/hr at 02/01/20 1805, 6 g at 02/01/20 1805 **FOLLOWED BY** magnesium sulfate 20 GM/500ML infusion, 2 g/hr, Intravenous, Continuous, Riana Murray , Last Rate: 50 mL/hr at 02/02/20 0134, 2 g/hr at 02/02/20 0134  •  metroNIDAZOLE (FLAGYL) 500 mg/100mL IVPB, 500 mg, Intravenous, Q8H, Riana Murray DO, 500 mg at 02/02/20 1124  •  oxyCODONE-acetaminophen (PERCOCET)  MG per tablet 1 tablet, 1 tablet, Oral, Q4H PRN, Riana Murray DO, 1 tablet at 02/02/20 1129  •  oxyCODONE-acetaminophen  (PERCOCET) 5-325 MG per tablet 1 tablet, 1 tablet, Oral, Q4H PRN, Riana Murray, DO, 1 tablet at 20 1451  •  simethicone (MYLICON) chewable tablet 80 mg, 80 mg, Oral, 4x Daily PRN, Riana Murray, DO, 80 mg at 20 1553  •  sodium chloride 0.9 % infusion, 50 mL/hr, Intravenous, Continuous, George Hanson MD  •  witch hazel-glycerin (TUCKS) pad, , Topical, PRN, George Hanson MD      Assessment/Plan     19-year-old -1-0-2 status post  followed by uterine rupture and subsequent diagnostic laparoscopy with exploratory laparotomy and supracervical hysterectomy now with elevated BP, fever and large pelvic hematoma     1) PPD#4/POD#4:  Tolerating a general diet. Her pain is controlled. Andrea catheter in place. Bed rest with SCDs     2) Elevated blood pressure/Suspected severe preeclampsia:   Patient's blood pressures are much improved after being started on magnesium sulfate.  Patient's magnesium sulfate will continue until 6 PM tonight which is 24 hours of magnesium sulfate therapy.  Her magnesium is therapeutic at a level of 5.4.  Patient has been started on labetalol 100 mg p.o. twice daily and that will be continued even after the magnesium is stopped.  24-hour urine will be pending to confirm diagnosis.     3) fever: Etiology of fever is unknown but likely due to the hematoma.     IV Flagyl and Levaquin have been started.  Patient's last fever was 100.8 at 23:34 20. WBC 8K.    4) pelvic hematoma: Large pelvic hematoma noted on CT scan today.  Patient is asymptomatic with the hematoma.  She denies any pain.  Serial H&H's were performed throughout the night and her hemoglobin has remained stable.  Hematology was consulted last night and they recommended a PFA-100 test to evaluate the patient's platelet function.  That test has returned and is mildly abnormal but not abnormal enough for patient to need a platelet transfusion.  Ibuprofen has been discontinued.   Fibrinogen, PT/PTT are within normal limits.  Patient's platelets last night were 202.  On physical exam today the hematoma does not appear to be expanding.  The hematoma appears to not be as firm as yesterday and the area is softening to palpation.  The patient denies any pain.     5) Acute blood loss anemia: In total patient lost approximately 4 L of blood from delivery and the supracervical hysterectomy.  She has received 6 units of packed red blood cells and 4 units of FFP.  Hemoglobin has improved to 9.0     6) thrombocytopenia: Improving. Pt's platelets are now 202.     The plan for the patient was thoroughly explained to patient and father of the baby via  again this am. I explained that pt is improving compared to yesterday and that at this point we will continue with magnesium sulfate for 24 hrs and then discontinue. Pt will stay on IV antibiotics until at least 24hrs afebrile.  All questions were answered.       Riana Murray DO  20  11:41 AM          Electronically signed by Riana Murray DO at 20 1151     Riana Murray DO at 20 1817          Patient Care Team:  System, Provider Not In as PCP - General        Chief Complaint:  Pelvic hematoma, elevated BP, fever    Subjective  19-year-old status post  that was complicated by uterine rupture and followed by diagnostic laparoscopy with exploratory laparotomy and supracervical hysterectomy- POD#3.  Patient called nursing staff into her room complaining of feeling hot.  Patient's temperature was found to be 102.5.  On physical exam the nursing staff noted some firmness superior to the patient's incision as well as some bruising.  I was called about this change in status and I ordered a stat CBC which revealed a hemoglobin that was stable at 8.7 and platelets 158.  A stat CT scan was ordered and reveals a large hematoma within the pelvic cul-de-sac extending into the adnexal regions measuring 14.4 cm x 11 cm x  "5.9 cm.  Hematoma also infiltrates the lower anterior abdominal wall musculature measuring 12.6 cm x 2 cm x 13.8 cm.  Additionally, patient's blood pressures were noted to be very elevated in the 150s to 160s over 100s.  Additionally heart rate tachycardic in the 110s to 120s.  With this change in status I presented to evaluate the patient myself.  Patient reports that she feels good.  She denies any abdominal pain or tenderness.  She reports flatus and she reports bowel movements.  She denies any nausea or vomiting.  She denies any headache or visual changes.      Objective    Vital Signs  Temp:  [98 °F (36.7 °C)-102.5 °F (39.2 °C)] 99.6 °F (37.6 °C)  Heart Rate:  [] 103  Resp:  [18] 18  BP: (130-167)/() 139/93    Flowsheet Rows      First Filed Value   Admission Height  154.9 cm (61\") Documented at 01/29/2020 0525   Admission Weight  67.9 kg (149 lb 11.2 oz) Documented at 01/29/2020 0525          Physical Exam:  Physical Exam   Constitutional: She is oriented to person, place, and time. She appears well-developed and well-nourished. No distress.   Cardiovascular: Regular rhythm and normal heart sounds.   tachycardic   Pulmonary/Chest: Breath sounds normal. No stridor. No respiratory distress.   Abdominal: Soft. She exhibits no distension. There is no tenderness. There is no guarding.   Abdomen soft except for 5-6cm superior to abdominal incision where firmness and ecchymosis is noted. +BS. No acute abdomen. Incision well healing.   Musculoskeletal: Normal range of motion. She exhibits no edema or tenderness.   +2DTRs of UE bilaterally.   Neurological: She is alert and oriented to person, place, and time. No cranial nerve deficit. Coordination normal.   Skin: She is not diaphoretic.   Psychiatric: She has a normal mood and affect. Her behavior is normal. Judgment and thought content normal.   Vitals reviewed.      Results Review:     I reviewed the patient's new clinical results.    Lab Results (last 24 " hours)     Procedure Component Value Units Date/Time    CBC & Differential [[id number]] Collected:  02/01/20 1523    Specimen:  Blood Updated:  02/01/20 1543    Narrative:       The following orders were created for panel order CBC & Differential.  Procedure                               Abnormality         Status                     ---------                               -----------         ------                     CBC Auto Differential[[id number]]        Abnormal            Final result                 Please view results for these tests on the individual orders.    CBC Auto Differential [[id number]]  (Abnormal) Collected:  02/01/20 1523    Specimen:  Blood Updated:  02/01/20 1543     WBC 8.48 10*3/mm3      RBC 2.82 10*6/mm3      Hemoglobin 8.7 g/dL      Hematocrit 25.1 %      MCV 89.0 fL      MCH 30.9 pg      MCHC 34.7 g/dL      RDW 14.0 %      RDW-SD 44.7 fl      MPV 10.9 fL      Platelets 158 10*3/mm3      Neutrophil % 83.3 %      Lymphocyte % 9.1 %      Monocyte % 5.9 %      Eosinophil % 0.8 %      Basophil % 0.2 %      Immature Grans % 0.7 %      Neutrophils, Absolute 7.06 10*3/mm3      Lymphocytes, Absolute 0.77 10*3/mm3      Monocytes, Absolute 0.50 10*3/mm3      Eosinophils, Absolute 0.07 10*3/mm3      Basophils, Absolute 0.02 10*3/mm3      Immature Grans, Absolute 0.06 10*3/mm3      nRBC 0.0 /100 WBC     Comprehensive Metabolic Panel [[id number]]  (Abnormal) Collected:  02/01/20 0558    Specimen:  Blood Updated:  02/01/20 0645     Glucose 89 mg/dL      BUN 5 mg/dL      Creatinine 0.40 mg/dL      Sodium 140 mmol/L      Potassium 3.5 mmol/L      Chloride 107 mmol/L      CO2 24.9 mmol/L      Calcium 10.1 mg/dL      Total Protein 5.0 g/dL      Albumin 2.40 g/dL      ALT (SGPT) 10 U/L      AST (SGOT) 18 U/L      Alkaline Phosphatase 85 U/L      Total Bilirubin 0.2 mg/dL      eGFR Non African Amer >150 mL/min/1.73      Globulin 2.6 gm/dL      A/G Ratio 0.9 g/dL      BUN/Creatinine Ratio 12.5     Anion Gap 8.1  mmol/L     Narrative:       GFR Normal >60  Chronic Kidney Disease <60  Kidney Failure <15      CBC (No Diff) [[id number]]  (Abnormal) Collected:  02/01/20 0558    Specimen:  Blood Updated:  02/01/20 0611     WBC 8.02 10*3/mm3      RBC 2.45 10*6/mm3      Hemoglobin 7.7 g/dL      Hematocrit 22.0 %      MCV 89.8 fL      MCH 31.4 pg      MCHC 35.0 g/dL      RDW 14.0 %      RDW-SD 45.5 fl      MPV 10.9 fL      Platelets 114 10*3/mm3           Imaging Results (Last 24 Hours)     Procedure Component Value Units Date/Time    CT Abdomen Pelvis Without Contrast [[id number]] Collected:  02/01/20 1626     Updated:  02/01/20 1628    Narrative:       CT Abdomen Pelvis WO 2/1/2020    INDICATION:   3 days postpartum with right lower quadrant abdominal pain and bruising. Uterine rupture, postpartum hemorrhage followed by hysterectomy.    TECHNIQUE:   CT of the abdomen and pelvis without IV contrast. Coronal and sagittal reconstructions were obtained.  Radiation dose reduction techniques included automated exposure control or exposure modulation based on body size. Count of known CT and cardiac nuc  med studies performed in previous 12 months: 0.     COMPARISON:   None available.    FINDINGS:  Abdomen: The liver, spleen, pancreas, gallbladder and biliary tree and adrenal glands are normal. There are nonobstructing renal stones bilaterally.    Pelvis: The gut is normal in appearance. The appendix is not definitely visualized. There are postoperative changes of recent hysterectomy with hyperdense stranding in the subcutaneous fat of the lower anterior abdominal and pelvic wall characteristic of  hematoma. There is gas seen within the subcutaneous fat of the anterior pelvic wall and in the peritoneal cavity in the prevesical fat from recent surgery. There is a large hematoma within the pelvis measuring approximately 14.4 cm x 5.9 cm x 11 cm  located along the posterior aspect of the urinary bladder and running anterior to the distal  colon and rectum. The hematoma extends into the adnexal regions bilaterally. There is mass effect on the urinary bladder posteriorly. Hematoma also infiltrates  the lower anterior abdominal wall musculature measuring approximately 12.6 cm x 2 cm by approximately 13.8 cm. The findings were called to the patient's nurse at 4:20 PM on 2/1/2020. Images of the lung bases demonstrate small bilateral pleural effusions  with by basilar atelectasis. There is a small hiatal hernia.      Impression:         1. Abnormal examination demonstrating large hematoma within the pelvic cul-de-sac extending into the adnexal regions bilaterally measuring 14.4 cm x 11 cm x 5.9 cm. Hematoma also infiltrates the lower anterior abdominal wall musculature measuring 12.6 cm  x 2 cm x 13.8 cm.  2. Postoperative changes of recent abdominal surgery with hematoma stranding in the subcutaneous fat of the lower anterior abdominal and pelvic wall as well as gas in the subcutaneous fat of the lower anterior and pelvic abdominal wall as well as in the  prevesical fat.  3. Nonobstructing renal stones bilaterally.  4. Small bilateral pleural effusions with bibasilar atelectasis.          Signer Name: Trey Fuentes MD   Signed: 2/1/2020 4:26 PM   Workstation Name: [id number]    [facility]          ECG/EMG Results (most recent)     None          Medication Review:   I have reviewed the patient's current medication list    Current Facility-Administered Medications:   •  diphenhydrAMINE (BENADRYL) capsule 25 mg, 25 mg, Oral, Q4H PRN **OR** diphenhydrAMINE (BENADRYL) injection 25 mg, 25 mg, Intravenous, Q4H PRN **OR** diphenhydrAMINE (BENADRYL) injection 25 mg, 25 mg, Intramuscular, Q4H PRN, Lizzy Villalobos, CRNA  •  docusate sodium (COLACE) capsule 100 mg, 100 mg, Oral, BID PRN, Riana Murray, DO, 100 mg at 02/01/20 0223  •  famotidine (PEPCID) tablet 20 mg, 20 mg, Oral, BID, Riana Murray, DO, 20 mg at 02/01/20 1026  •  ferrous  gluconate (FERGON) tablet 324 mg, 324 mg, Oral, Daily With Breakfast, Riana Murray DO, 324 mg at 20 1026  •  labetalol (NORMODYNE) tablet 100 mg, 100 mg, Oral, Q12H, Riana Murray DO  •  lactated ringers infusion, 150 mL/hr, Intravenous, Continuous, George Hanson MD, Last Rate: 150 mL/hr at 20 1804, 150 mL/hr at 20 1804  •  levoFLOXacin (LEVAQUIN) 500 mg/100 mL D5W (premix) (LEVAQUIN) 500 mg, 500 mg, Intravenous, Q24H, Riana Murray DO  •  magnesium sulfate bolus from bag 0.04 g/mL 6 g, 6 g, Intravenous, Once **FOLLOWED BY** magnesium sulfate 20 GM/500ML infusion, 2 g/hr, Intravenous, Continuous, Riana Murray DO, Last Rate: 50 mL/hr at 20 180, 2 g/hr at 20 180  •  metroNIDAZOLE (FLAGYL) 500 mg/100mL IVPB, 500 mg, Intravenous, Q8H, Riana Murray DO  •  oxyCODONE-acetaminophen (PERCOCET)  MG per tablet 1 tablet, 1 tablet, Oral, Q4H PRN, Riana Murray DO, 1 tablet at 20 2056  •  oxyCODONE-acetaminophen (PERCOCET) 5-325 MG per tablet 1 tablet, 1 tablet, Oral, Q4H PRN, Riana Murray DO, 1 tablet at 20 1451  •  simethicone (MYLICON) chewable tablet 80 mg, 80 mg, Oral, 4x Daily PRN, Riana Murray DO, 80 mg at 20 1553  •  sodium chloride 0.9 % infusion, 50 mL/hr, Intravenous, Continuous, George Hanson MD  •  witch hazel-glycerin (TUCKS) pad, , Topical, PRN, George Hanson MD      Assessment/Plan     19-year-old -1-0-2 status post  followed by uterine rupture and subsequent diagnostic laparoscopy with exploratory laparotomy and supracervical hysterectomy now with elevated BP, fever and large pelvic hematoma     1) PPD#3/POD#3:  She is ambulating and tolerating a general diet.  Pt is voiding freely today. Her pain is controlled.    2) Elevated blood pressure: She has blood pressure severely elevated in the 150s to 160s over 100s.  Patient has a history of severe preeclampsia with her prior  pregnancy.  Due to this abrupt change in blood pressure will start magnesium sulfate with a presumptive diagnosis of severe preeclampsia.  Andrea catheter will be inserted to monitor I's and O's and a 24-hour urine collection will be started.  Magnesium 6 g bolus followed by 2 g/h.  Will check magnesium level 4 hours after administration.  Patient was given a dose of hydralazine 5 mg and her blood pressures came down well to the 130s over 80s.  Will start labetalol 100 mg p.o. twice daily.    3) fever: Etiology of fever is unknown but likely due to the hematoma.  Will start IV Flagyl and Levaquin.  White blood cell count is normal at 8K.    4) pelvic hematoma: Large pelvic hematoma noted on CT scan today.  Patient is asymptomatic with a hematoma.  She denies any pain.  It is unknown at this time if the hematoma is expanding in size.  We will follow serial CBCs to monitor her hemoglobin.  Currently patient's hemoglobin has improved from 7.7 this morning to 8.7 now.  We will check PT PTT and fibrinogen level.  Will discontinue ibuprofen.  If hematoma appears to be expanding or patient's blood counts begin to decrease then will consider exploratory laparotomy with evacuation of hematoma.     5) Acute blood loss anemia: In total patient lost approximately 4 L of blood from delivery and the supracervical hysterectomy.  She has received 6 units of packed red blood cells and 4 units of FFP.  Hemoglobin has improved to 8.7.    6) thrombocytopenia: Improving. Pt's platelets are now 158.    The plan for the patient was thoroughly explained to patient and father of the baby via .  All questions were answered.  Patient will be brought over to the labor and delivery unit so she can be closely monitored.    Riana Murray DO  02/01/20  6:17 PM          Electronically signed by Riana Murray DO at 02/01/20 6761     Riana Murray DO at 02/01/20 4420          Patient Care Team:  System, Provider Not In as  "PCP - General        Chief Complaint:  PPD/POD#3    Subjective    19-year-old status post  that was complicated by uterine rupture and followed by diagnostic laparoscopy with exploratory laparotomy and supracervical hysterectomy.   Pt reports that she feels well.  She does not have any lightheadedness or dizziness.  She denies any nausea or vomiting.  Her hemoglobin is noted to be 7.7 today.  Her pain is well controlled.  She is ambulating without difficulty. She is voiding freely. She is tolerating a general diet.  Patient is breast-feeding.     Objective    Vital Signs  Temp:  [98 °F (36.7 °C)-99.6 °F (37.6 °C)] 99.6 °F (37.6 °C)  Heart Rate:  [] 105  Resp:  [18] 18  BP: (130-148)/() 136/92    Flowsheet Rows      First Filed Value   Admission Height  154.9 cm (61\") Documented at 2020   Admission Weight  67.9 kg (149 lb 11.2 oz) Documented at 2020          Physical Exam:  Physical Exam   Constitutional: She is oriented to person, place, and time. She appears well-developed and well-nourished. No distress.   Cardiovascular: Normal rate, regular rhythm and normal heart sounds.   Pulmonary/Chest: Effort normal and breath sounds normal. No stridor. No respiratory distress. She has no wheezes.   Abdominal: Soft. She exhibits no distension and no mass. There is no tenderness. There is no guarding.   Incision healing well.   Neurological: She is alert and oriented to person, place, and time. No cranial nerve deficit. Coordination normal.   Skin: She is not diaphoretic.   Psychiatric: She has a normal mood and affect. Her behavior is normal. Judgment and thought content normal.   Vitals reviewed.      Results Review:     I reviewed the patient's new clinical results.    Lab Results (last 24 hours)     Procedure Component Value Units Date/Time    Comprehensive Metabolic Panel [[id number]]  (Abnormal) Collected:  2058    Specimen:  Blood Updated:  20 0645     Glucose 89 " mg/dL      BUN 5 mg/dL      Creatinine 0.40 mg/dL      Sodium 140 mmol/L      Potassium 3.5 mmol/L      Chloride 107 mmol/L      CO2 24.9 mmol/L      Calcium 10.1 mg/dL      Total Protein 5.0 g/dL      Albumin 2.40 g/dL      ALT (SGPT) 10 U/L      AST (SGOT) 18 U/L      Alkaline Phosphatase 85 U/L      Total Bilirubin 0.2 mg/dL      eGFR Non African Amer >150 mL/min/1.73      Globulin 2.6 gm/dL      A/G Ratio 0.9 g/dL      BUN/Creatinine Ratio 12.5     Anion Gap 8.1 mmol/L     Narrative:       GFR Normal >60  Chronic Kidney Disease <60  Kidney Failure <15      CBC (No Diff) [[id number]]  (Abnormal) Collected:  02/01/20 0558    Specimen:  Blood Updated:  02/01/20 0611     WBC 8.02 10*3/mm3      RBC 2.45 10*6/mm3      Hemoglobin 7.7 g/dL      Hematocrit 22.0 %      MCV 89.8 fL      MCH 31.4 pg      MCHC 35.0 g/dL      RDW 14.0 %      RDW-SD 45.5 fl      MPV 10.9 fL      Platelets 114 10*3/mm3     Tissue Pathology Exam [[id number]] Collected:  01/29/20 1722    Specimen:  Tissue from Uterus Updated:  01/31/20 1454     Case Report --     Surgical Pathology Report                         Case: [id number]                                  Authorizing Provider:  Riana Murray DO       Collected:           01/29/2020 05:22 PM          Ordering Location:     [facility]   Received:            01/29/2020 06:17 PM                                 OR                                                                           Pathologist:           Angelo Teixeira MD                                                         Specimen:    Uterus                                                                                      Final Diagnosis --     1. Uterus and Endocervix, and Fetal Membranes, Hysterectomy:   A. Endocervix:    1. Focal mucosal erosion and decidual change.    2. No dysplasia nor malignancy identified.    B. Endometrium:     1. Predominantly slough denuded hemorrhagic endometrial stromal with diffuse  "decidual effect.    2. No atypical endometrial hyperplasia, malignancy nor definitive retained products of conception         identified.    C. Myometrium:    1. Within normal limits.    2. No malignancy identified.   D. Serosa:     1. Within  Normal limits.    2. No endometriosis nor malignancy identified.   E. Separately submitted fetal membranes:    1. Scattered mild to moderate meconium staining.    2. No significant active inflammation nor viral inclusions identified.   F. Blood clot:     1. Organizng blood clot with retained fragments of myometrial and parametrial tissue, 7.0 x 5.5 x 3.0 cm.    2. No associated active vasculitis nor arterial dissection identified.    jat/pkm        Gross Description --     1. Received in formalin labeled \"uterus\" is a 1,017 gram, 16.0 x 15.0 x 8.5 cm uterus and cervix.  There is minimal visible ectocervix which appears to be on the anterior aspect which measures 4.0 x 1.6 cm.  The os is ovoid and patent measuring 7 cm in diameter.  The endocervical canal measures 5.5 cm in length and 6 cm in circumference and has a marked amount of adherent clotted blood.  On the right lateral aspect surrounding the parametria is a 7.0 x 5.5 x 3.0 cm focus of hemorrhage and adherent clotted blood which is adjacent to the parametrial vessels.  There appears to be no placental tissue within the cervix or this area within the parametria.  The endometrial cavity is triangular measuring 9.5 cm from cornu to cornu and 10 cm in length.  The endometrium is hemorrhagic and averages 0.4 cm in thickness.  No residual placenta is grossly identified within the endometrial cavity as well.  The myometrium is tan pink and trabeculated, averaging 4.8 cm in thickness.  There are no discrete lesions or masses throughout the uterus and cervix.  Also received in the same container is a 7.0 x 5.0 x 3.0 cm aggregate of tan green placental membranes which is semitransparent.  No placental tissue is grossly identified " within the specimen.  Representative sections are submitted as follows:   1A-1B - anterior endo-ectocervix.   1C-1D - posterior cervix with no grossly visible ectocervix.  1E-1F - anterior endomyometrium.  1G-1H - posterior endomyometrium.    II-1J - placental membrane received separately in the same container.   1K-1P - sections from the hemorrhage in the right lateral parametrial area.     JAP/USO/JAB/brb         Microscopic Description --     Performed, incorporated in diagnosis.            Imaging Results (Last 24 Hours)     ** No results found for the last 24 hours. **          ECG/EMG Results (most recent)     None          Medication Review:   I have reviewed the patient's current medication list    Current Facility-Administered Medications:   •  diphenhydrAMINE (BENADRYL) capsule 25 mg, 25 mg, Oral, Q4H PRN **OR** diphenhydrAMINE (BENADRYL) injection 25 mg, 25 mg, Intravenous, Q4H PRN **OR** diphenhydrAMINE (BENADRYL) injection 25 mg, 25 mg, Intramuscular, Q4H PRN, Lizzy Villalobos, CRNA  •  docusate sodium (COLACE) capsule 100 mg, 100 mg, Oral, BID PRN, Riana Murray DO, 100 mg at 02/01/20 0223  •  famotidine (PEPCID) tablet 20 mg, 20 mg, Oral, BID, Riana Murray DO, 20 mg at 02/01/20 1026  •  ferrous gluconate (FERGON) tablet 324 mg, 324 mg, Oral, Daily With Breakfast, Riana Murray DO, 324 mg at 02/01/20 1026  •  ibuprofen (ADVIL,MOTRIN) tablet 800 mg, 800 mg, Oral, TID PRN, Riana Murray DO, 800 mg at 02/01/20 0221  •  lactated ringers infusion, 150 mL/hr, Intravenous, Continuous, George Hanson MD, Stopped at 01/30/20 1730  •  oxyCODONE-acetaminophen (PERCOCET)  MG per tablet 1 tablet, 1 tablet, Oral, Q4H PRN, Riana Murray DO, 1 tablet at 01/31/20 2056  •  oxyCODONE-acetaminophen (PERCOCET) 5-325 MG per tablet 1 tablet, 1 tablet, Oral, Q4H PRN, Riana Murray, DO, 1 tablet at 02/01/20 0221  •  simethicone (MYLICON) chewable tablet 80 mg, 80 mg, Oral, 4x Daily PRN,  Riana Murray DO, 80 mg at 20 1553  •  sodium chloride 0.9 % infusion, 50 mL/hr, Intravenous, Continuous, George Hanson MD  •  witch hazel-glycerin (TUCKS) pad, , Topical, PRN, George Hanson MD      Assessment/Plan     19-year-old -1-0-2 status post  followed by uterine rupture and subsequent diagnostic laparoscopy with exploratory laparotomy and supracervical hysterectomy.     1) PPD#3/POD#3: Pt is stable and progressing.  She is ambulating and tolerating a general diet.   Pt is voiding freely today. Her pain is controlled.     2) Acute blood loss anemia: In total patient lost approximately 4 L of blood from delivery and the supracervical hysterectomy.  She  has received 6 units of packed red blood cells and 4 units of FFP. Her hemoglobin is 7.7 this am which is essentially stable from yesterday.   Patient denies any dizziness or lightheadedness.  Patient will need to be maintained on iron the next 3 months.  We will check another CBC in the a.m.     3) thrombocytopenia: Improving. Pt's platelets improved from 84 to 114. Will repeat in am.    4) Elevated BP: Pt had a couple elevated BP's overnight. She has a hx of severe pre eclampsia. Her BP's are only mildly elevated. Will continue to monitor overnight.    5) Dispo: Suspect pt will be discharged tomorrow if CBC is stable and BP's are not elevating. Plan reviewed with pt and FOB.      Riana Murray DO  20  12:17 PM          Electronically signed by Riana Murray DO at 20 1223     Riana Murray DO at 20 1009          Patient Care Team:  System, Provider Not In as PCP - General        Chief Complaint:  PPD/POD#2    Subjective    19-year-old status post  that was complicated by uterine rupture and followed by diagnostic laparoscopy with exploratory laparotomy and supracervical hysterectomy.  Patient had acute blood loss anemia and she has had 6 units of packed red blood cells as well as 4  "units of FFP.  Patient reports that she is doing well.  She does not have any lightheadedness or dizziness.  She denies any nausea or vomiting.  Her hemoglobin is noted to be 8 today.  Her pain is well controlled.  She is ambulating without difficulty.  She is tolerating a general diet.  She had to be straight catheterized this morning with 700 cc urine output because she could not void after Andrea was removed.  We are waiting for void now and patient is aware that if she does not void that she will end up with a Andrea catheter today.  Patient is breast-feeding. She is asking about when she gets to go home.      Objective    Vital Signs  Temp:  [98.4 °F (36.9 °C)-100.2 °F (37.9 °C)] 98.6 °F (37 °C)  Heart Rate:  [] 101  Resp:  [15-20] 16  BP: (101-135)/(58-92) 123/66    Flowsheet Rows      First Filed Value   Admission Height  154.9 cm (61\") Documented at 01/29/2020 0525   Admission Weight  67.9 kg (149 lb 11.2 oz) Documented at 01/29/2020 0525          Physical Exam:  Physical Exam   Constitutional: She is oriented to person, place, and time. She appears well-developed and well-nourished. No distress.   Cardiovascular: Normal rate, regular rhythm and normal heart sounds.   Pulmonary/Chest: Effort normal and breath sounds normal. No stridor. No respiratory distress. She has no wheezes. She has no rales.   Abdominal: Soft. Bowel sounds are normal. She exhibits no distension. There is no tenderness. There is no guarding.   Vertical skin incision C/D/I   Neurological: She is alert and oriented to person, place, and time. No cranial nerve deficit. Coordination normal.   Skin: Skin is warm. She is not diaphoretic. No erythema. There is pallor.   Psychiatric: She has a normal mood and affect. Her behavior is normal. Judgment and thought content normal.   Vitals reviewed.      Results Review:     I reviewed the patient's new clinical results.    Lab Results (last 24 hours)     Procedure Component Value Units " Date/Time    Comprehensive Metabolic Panel [[id number]]  (Abnormal) Collected:  01/31/20 0552    Specimen:  Blood Updated:  01/31/20 0644     Glucose 87 mg/dL      BUN 5 mg/dL      Creatinine 0.38 mg/dL      Sodium 138 mmol/L      Potassium 3.6 mmol/L      Chloride 108 mmol/L      CO2 24.4 mmol/L      Calcium 9.7 mg/dL      Total Protein 5.1 g/dL      Albumin 2.60 g/dL      ALT (SGPT) 11 U/L      AST (SGOT) 25 U/L      Alkaline Phosphatase 82 U/L      Total Bilirubin 0.3 mg/dL      eGFR Non African Amer >150 mL/min/1.73      Globulin 2.5 gm/dL      A/G Ratio 1.0 g/dL      BUN/Creatinine Ratio 13.2     Anion Gap 5.6 mmol/L     Narrative:       GFR Normal >60  Chronic Kidney Disease <60  Kidney Failure <15      CBC & Differential [[id number]] Collected:  01/31/20 0552    Specimen:  Blood Updated:  01/31/20 0620    Narrative:       The following orders were created for panel order CBC & Differential.  Procedure                               Abnormality         Status                     ---------                               -----------         ------                     CBC Auto Differential[[id number]]        Abnormal            Final result                 Please view results for these tests on the individual orders.    CBC Auto Differential [[id number]]  (Abnormal) Collected:  01/31/20 0552    Specimen:  Blood Updated:  01/31/20 0620     WBC 9.53 10*3/mm3      RBC 2.59 10*6/mm3      Hemoglobin 8.1 g/dL      Hematocrit 23.2 %      MCV 89.6 fL      MCH 31.3 pg      MCHC 34.9 g/dL      RDW 14.4 %      RDW-SD 46.3 fl      MPV 11.7 fL      Platelets 84 10*3/mm3      Neutrophil % 83.5 %      Lymphocyte % 8.7 %      Monocyte % 6.5 %      Eosinophil % 0.4 %      Basophil % 0.2 %      Immature Grans % 0.7 %      Neutrophils, Absolute 7.95 10*3/mm3      Lymphocytes, Absolute 0.83 10*3/mm3      Monocytes, Absolute 0.62 10*3/mm3      Eosinophils, Absolute 0.04 10*3/mm3      Basophils, Absolute 0.02 10*3/mm3      Immature Grans,  Absolute 0.07 10*3/mm3      nRBC 0.0 /100 WBC     Urinalysis With Microscopic If Indicated (No Culture) - Urine, Catheter [[id number]]  (Normal) Collected:  01/30/20 2146    Specimen:  Urine, Catheter Updated:  01/30/20 2155     Color, UA Yellow     Appearance, UA Clear     pH, UA 6.0     Specific Gravity, UA 1.010     Glucose, UA Negative     Ketones, UA Negative     Bilirubin, UA Negative     Blood, UA Negative     Protein, UA Negative     Leuk Esterase, UA Negative     Nitrite, UA Negative     Urobilinogen, UA 1.0 E.U./dL    Narrative:       Urine microscopic not indicated.    CBC & Differential [[id number]] Collected:  01/30/20 2146    Specimen:  Blood Updated:  01/30/20 2152    Narrative:       The following orders were created for panel order CBC & Differential.  Procedure                               Abnormality         Status                     ---------                               -----------         ------                     CBC Auto Differential[[id number]]        Abnormal            Final result                 Please view results for these tests on the individual orders.    CBC Auto Differential [[id number]]  (Abnormal) Collected:  01/30/20 2146    Specimen:  Blood Updated:  01/30/20 2152     WBC 9.58 10*3/mm3      RBC 2.56 10*6/mm3      Hemoglobin 8.0 g/dL      Hematocrit 22.9 %      MCV 89.5 fL      MCH 31.3 pg      MCHC 34.9 g/dL      RDW 14.3 %      RDW-SD 46.3 fl      MPV 11.7 fL      Platelets 86 10*3/mm3      Neutrophil % 75.4 %      Lymphocyte % 16.2 %      Monocyte % 7.3 %      Eosinophil % 0.3 %      Basophil % 0.1 %      Immature Grans % 0.7 %      Neutrophils, Absolute 7.22 10*3/mm3      Lymphocytes, Absolute 1.55 10*3/mm3      Monocytes, Absolute 0.70 10*3/mm3      Eosinophils, Absolute 0.03 10*3/mm3      Basophils, Absolute 0.01 10*3/mm3      Immature Grans, Absolute 0.07 10*3/mm3      nRBC 0.0 /100 WBC           Imaging Results (Last 24 Hours)     ** No results found for the last 24  hours. **          ECG/EMG Results (most recent)     None          Medication Review:   I have reviewed the patient's current medication list    Current Facility-Administered Medications:   •  diphenhydrAMINE (BENADRYL) capsule 25 mg, 25 mg, Oral, Q4H PRN **OR** diphenhydrAMINE (BENADRYL) injection 25 mg, 25 mg, Intravenous, Q4H PRN **OR** diphenhydrAMINE (BENADRYL) injection 25 mg, 25 mg, Intramuscular, Q4H PRN, Lizzy Villalobos CRNA  •  docusate sodium (COLACE) capsule 100 mg, 100 mg, Oral, BID PRN, Riana Murray DO, 100 mg at 20 0830  •  famotidine (PEPCID) tablet 20 mg, 20 mg, Oral, BID, Riana Murray DO, 20 mg at 20 0830  •  ferrous gluconate (FERGON) tablet 324 mg, 324 mg, Oral, Daily With Breakfast, Riana Murray DO, 324 mg at 20 0830  •  ibuprofen (ADVIL,MOTRIN) tablet 800 mg, 800 mg, Oral, TID PRN, Riana Murray DO, 800 mg at 20 0637  •  lactated ringers infusion, 150 mL/hr, Intravenous, Continuous, George Hanson MD, Stopped at 20 1730  •  oxyCODONE-acetaminophen (PERCOCET)  MG per tablet 1 tablet, 1 tablet, Oral, Q4H PRN, Riana Murray DO, 1 tablet at 20 0835  •  oxyCODONE-acetaminophen (PERCOCET) 5-325 MG per tablet 1 tablet, 1 tablet, Oral, Q4H PRN, Riana Murray DO  •  simethicone (MYLICON) chewable tablet 80 mg, 80 mg, Oral, 4x Daily PRN, Riana Murray DO, 80 mg at 20 0356  •  sodium chloride 0.9 % infusion, 50 mL/hr, Intravenous, Continuous, George Hanson MD  •  witch hazel-glycerin (TUCKS) pad, , Topical, PRN, McDanald, George Wills MD      Assessment/Plan     19-year-old -1-0-2 status post  followed by uterine rupture and subsequent diagnostic laparoscopy with exploratory laparotomy and supracervical hysterectomy.    1) PPD#2/POD#2: Pt is stable and progressing.  She is ambulating and tolerating a general diet.  She had to be straight catheterized after Nadrea was removed with 700 cc  "release.  Patient is yet to void since that straight catheterization.  Patient was encouraged to continue with p.o. hydration and we will monitor voiding today.  If patient does not void 6 hours after the last void then another Andrea catheter will be inserted.    2) Acute blood loss anemia: In total patient lost approximately 4 L of blood from delivery and the supracervical hysterectomy.  She is received 6 units of packed red blood cells and 4 units of FFP. Her hemoglobin is stable at 8.  Patient's vital signs are stable and patient denies any dizziness or lightheadedness.  Patient will need to be maintained on iron the next 3 months.  We will check another CBC in the a.m.    3) thrombocytopenia: Pt's platelets are stable at 84. Will repeat in am.        Riana Murray DO  20  10:09 AM        Electronically signed by Riana Murray DO at 20 1019     María Smith APRN at 20 0930     Attestation signed by Tomi Cooney MD at 20    I have reviewed the documentation above and agree.                      Patient Care Team:  System, Provider Not In as PCP - General        Chief Complaint:  POD #1- S/P  followed by DIAGNOSTIC LAPAROSCOPY followed by exploratory laparotomy with supracervical hysterectomy.       Chief Complaint   Patient presents with   • Scheduled Induction     TOLAC         Subjective    Interval History and ROS:     Patient States she feels tired. She has mild pain in her abdomen. She reports flatus.    Patient Complaints: Mild pain in her abdomen, fatigue, weak   Patient Denies:  Uncontrolled pain.   History taken from: patient chart family      Objective    Vital Signs  Temp:  [98 °F (36.7 °C)-99.4 °F (37.4 °C)] 99.4 °F (37.4 °C)  Heart Rate:  [] 80  Resp:  [16-22] 16  BP: ()/() 115/76    Flowsheet Rows      First Filed Value   Admission Height  154.9 cm (61\") Documented at 2020 0525   Admission Weight  67.9 kg (149 lb 11.2 " oz) Documented at 01/29/2020 0525          Physical Exam:  General Appearance: alert, pleasant, interactive, fatigued, cooperative and pale  Lungs: clear to auscultation, respirations regular, respirations even and respirations unlabored  Abdomen: soft, distended, bowel sounds absent and dressing C/D/I  Extremities: moves extremities well, no cyanosis, no redness, no tenderness, Bear's sign negative and trace edema, SCDs in place  Skin: pale   Psych: normal    Results Review:     I reviewed the patient's new clinical results.    Lab Results (last 24 hours)     Procedure Component Value Units Date/Time    Comprehensive Metabolic Panel [[id number]]  (Abnormal) Collected:  01/30/20 0600    Specimen:  Blood Updated:  01/30/20 0634     Glucose 82 mg/dL      BUN 10 mg/dL      Creatinine 0.50 mg/dL      Sodium 130 mmol/L      Potassium 4.2 mmol/L      Chloride 100 mmol/L      CO2 24.7 mmol/L      Calcium 8.7 mg/dL      Total Protein 4.1 g/dL      Albumin 2.30 g/dL      ALT (SGPT) 10 U/L      AST (SGOT) 26 U/L      Alkaline Phosphatase 65 U/L      Total Bilirubin 0.4 mg/dL      eGFR Non African Amer >150 mL/min/1.73      Globulin 1.8 gm/dL      A/G Ratio 1.3 g/dL      BUN/Creatinine Ratio 20.0     Anion Gap 5.3 mmol/L     Narrative:       GFR Normal >60  Chronic Kidney Disease <60  Kidney Failure <15      CBC (No Diff) [[id number]]  (Abnormal) Collected:  01/30/20 0600    Specimen:  Blood Updated:  01/30/20 0622     WBC 9.62 10*3/mm3      RBC 2.05 10*6/mm3      Hemoglobin 6.3 g/dL      Hematocrit 18.9 %      MCV 92.2 fL      MCH 30.7 pg      MCHC 33.3 g/dL      RDW 13.8 %      RDW-SD 46.8 fl      MPV 12.3 fL      Platelets 94 10*3/mm3     CBC (No Diff) [[id number]]  (Abnormal) Collected:  01/29/20 2157    Specimen:  Blood Updated:  01/29/20 2216     WBC 12.36 10*3/mm3      RBC 2.83 10*6/mm3      Hemoglobin 8.9 g/dL      Hematocrit 25.6 %      MCV 90.5 fL      MCH 31.4 pg      MCHC 34.8 g/dL      RDW 13.4 %      RDW-SD 44.3 fl       MPV 12.6 fL      Platelets 102 10*3/mm3     Tissue Pathology Exam [[id number]] Collected:  01/29/20 1722    Specimen:  Tissue from Uterus Updated:  01/29/20 1817    Hemoglobin & Hematocrit, Blood [[id number]]  (Abnormal) Collected:  01/29/20 1553    Specimen:  Blood Updated:  01/29/20 1622     Hemoglobin 9.5 g/dL      Hematocrit 27.6 %           Imaging Results (Last 24 Hours)     ** No results found for the last 24 hours. **          Xray not reviewed personally by physician.      ECG not reviewed personally by physician  ECG/EMG Results (most recent)     None          Medication Review:   I have reviewed the patient's current medication list    Current Facility-Administered Medications:   •  diphenhydrAMINE (BENADRYL) capsule 25 mg, 25 mg, Oral, Q4H PRN **OR** diphenhydrAMINE (BENADRYL) injection 25 mg, 25 mg, Intravenous, Q4H PRN **OR** diphenhydrAMINE (BENADRYL) injection 25 mg, 25 mg, Intramuscular, Q4H PRN, Lizzy Villalobos CRNA  •  docusate sodium (COLACE) capsule 100 mg, 100 mg, Oral, BID PRN, Riana Murray DO, 100 mg at 01/30/20 0958  •  famotidine (PEPCID) tablet 20 mg, 20 mg, Oral, BID, Riana Murray DO, 20 mg at 01/30/20 0958  •  ferrous gluconate (FERGON) tablet 324 mg, 324 mg, Oral, Daily With Breakfast, Riana Murray DO, 324 mg at 01/30/20 0957  •  HYDROcodone-acetaminophen (NORCO) 7.5-325 MG per tablet 1 tablet, 1 tablet, Oral, Q4H PRN, Lizzy Villalobos CRNA, 1 tablet at 01/30/20 0357  •  HYDROmorphone PF (DILAUDID) injection 0.5 mg, 0.5 mg, Intravenous, Q30 Min PRN, Lizzy Villalobos CRNA, 0.5 mg at 01/30/20 0147  •  ibuprofen (ADVIL,MOTRIN) tablet 800 mg, 800 mg, Oral, TID PRN, Riana Murray, DO  •  ketorolac (TORADOL) injection 30 mg, 30 mg, Intravenous, Q6H, Lizzy Villalobos CRNA, 30 mg at 01/30/20 0559  •  lactated ringers infusion, 150 mL/hr, Intravenous, Continuous, George Hanson MD, Last Rate: 150 mL/hr at 01/30/20 0350, 150 mL/hr at 01/30/20 0350  •  naloxone  (NARCAN) 0.4 mg in lactated ringers 1,000 mL infusion, 0.4 mg, Intravenous, Continuous PRN, Lizzy Villalobos CRNA  •  ondansetron (ZOFRAN) injection 4 mg, 4 mg, Intravenous, Once PRN, Lizzy Villalobos, LALA, 4 mg at 20 203  •  oxyCODONE-acetaminophen (PERCOCET)  MG per tablet 1 tablet, 1 tablet, Oral, Q4H PRN, Riana Murray,   •  oxyCODONE-acetaminophen (PERCOCET) 5-325 MG per tablet 1 tablet, 1 tablet, Oral, Q4H PRN, Riana Murray DO  •  oxyCODONE-acetaminophen (PERCOCET) 5-325 MG per tablet 1 tablet, 1 tablet, Oral, Q4H PRN, Lizzy Villalobos CRNA, 1 tablet at 20 1042  •  simethicone (MYLICON) chewable tablet 80 mg, 80 mg, Oral, 4x Daily PRN, Riana Murray DO, 80 mg at 20 0616  •  sodium chloride 0.9 % infusion  - ADS Override Pull, , , ,   •  sodium chloride 0.9 % infusion, 50 mL/hr, Intravenous, Continuous, George Hanson MD  •  witch hazel-glycerin (TUCKS) pad, , Topical, PRN, George Hanson MD    @[email or web address]@    Assessment/Plan     [unfilled]    1) POD #1- S/P , Uterine rupture and DIAGNOSTIC LAPAROSCOPY followed by exploratory laparotomy with supracervical hysterectomy. She is progressing fairly well. She been infused 4 units of PRBCs with the 5th unit currently infusing which will be followed by a 6th unit of PRBCs. She has also rec'd 2 units of FFP. She has SCDs in place. Her vital signs are stable. Her most recent Hgb is 6.3g/dL. She has a ybarra cath in place. >1000cc have been emptied from her ybarra cath since 0700 which indicates her urinary output is adequate. She has tolerated a regular diet. She reports flatus. Plan to D/C IV fluids and ybarra catheter after completion of blood transfusion. Enc IS and ambulation later today. Her incision is covered with a pressure dressing that is C/D/I. Rec abdominal binder when ambulating. Labs, blood products, plan of care, uterine rupture, surgery, etc was disc in detail with patient, partner, and family.  She is resting comfortably in bed at this time. Plan to check CBC and CMP in the AM.     2)  Male infant- Breast and bottle       Plan for disposition: Consider home Saturday or Sunday     María Smith, APRN  01/30/20  0930    Time: More than 50% of time spent in counseling and coordination of care:  Total face-to-face/floor time 30 min.  Time spent in counseling 20 min. Counseling included the following topics: plan of care, labs, medication, surgerys, uterine rupture    Electronically signed by Tomi Cooney MD at 01/30/20 2010         Maternal Vitals (last 7 days)     Date/Time   Temp   Pulse   Resp   BP   SpO2   Weight    02/04/20 1607   99 (37.2)   108   18   148/100   98   --    02/04/20 1606   100.5 (38.1)   --   --   --   --   --    02/04/20 1103   98.5 (36.9)   89   18   133/81   --   --    02/04/20 0838   99.1 (37.3)   --   --   --   --   --    02/04/20 0714   100 (37.8)   107   18   146/98   97   --    02/03/20 2338   98.8 (37.1)   98   16   149/92   --   --    02/03/20 2059   --   102   --   154/93   --   --    02/03/20 1644   98.7 (37.1)   87   16   138/95   --   --    02/03/20 1300   97.7 (36.5)   91   18   140/95   --   --    02/03/20 0850   98.7 (37.1)   101   18   145/87   --   --    02/03/20 0635   --   84   --   147/87   --   --    02/03/20 0535   --   79   --   135/87   --   --    02/03/20 0435   98.3 (36.8)   77   18   149/94   --   --    02/03/20 0359   --   83   --   136/80   --   --    02/03/20 0330   --   88   --   123/73   --   --    02/03/20 0259   --   86   --   140/82   --   --    02/03/20 0230   --   81   --   136/80   --   --    02/03/20 0150   --   86   --   136/81   --   --    02/03/20 0141   --   92   --   137/82   --   --    02/03/20 0130   --   96   --   140/88   --   --    02/03/20 0120   --   94   --   142/84   --   --    02/03/20 0112   --   87   --   140/87   --   --    02/03/20 0106   --   90   --   154/96   --   --    02/03/20 0027   100 (37.8)   104   18    (!) 158/102   --   --    02/03/20 0021   --   110   --   (!) 167/101   --   --    02/02/20 2236   --   97   --   147/89   --   --    02/02/20 2106   --   105   --   157/89   --   --    02/02/20 2010   99.9 (37.7)   100   18   150/97   --   --    02/02/20 1809   --   93   --   --   99   --    02/02/20 1805   97.8 (36.6)   95   14   148/94   --   --    02/02/20 1705   --   95   --   137/93   --   --    02/02/20 1509   --   79   --   --   99   --    02/02/20 1506   --   86   --   141/96   --   --    02/02/20 1504   --   85   --   --   95   --    02/02/20 1500   --   82   --   --   96   --    02/02/20 1408   --   80   --   --   91   --    02/02/20 1406   98.1 (36.7)   78   16   133/81   --   --    02/02/20 1404   --   80   --   --   91   --    02/02/20 1309   --   84   --   --   96   --    02/02/20 1306   --   80   --   138/92   --   --    02/02/20 1304   --   84   --   --   100   --    02/02/20 1300   --   84   --   --   100   --    02/02/20 1230   98.3 (36.8)   --   18   --   --   --    02/02/20 1105   --   84   18   137/91   99   --    02/02/20 1005   --   84   18   137/92   99   --    02/02/20 0936   --   78   18   127/77   --   --    02/02/20 0905   --   --   18   128/75   --   --    02/02/20 0805   98.1 (36.7)   83   18   139/91   97   --    02/02/20 0754   --   87   --   --   --   --    02/02/20 0749   --   82   --   --   --   --    02/02/20 0744   --   91   --   --   --   --    02/02/20 0713   --   93   18   141/93   --   --    02/02/20 0702   --   97   --   145/83   --   --    02/02/20 0602   98.5 (36.9)   85   18   122/80   --   --    02/02/20 0502   --   83   18   131/80   --   --    02/02/20 0402   --   83   18   117/77   --   --    02/02/20 0301   98.3 (36.8)   93   18   123/82   --   --    02/02/20 0109   98.3 (36.8)   99   18   --   --   --    02/02/20 0102   --   92   --   120/72   --   --    02/02/20 0002   --   98   18   121/74   --   --    02/01/20 2334   (!) 100.8 (38.2)   104   18   --   --   --     02/01/20 2302   --   106   18   132/78   --   --    02/01/20 2202   (!) 102.1 (38.9)   110   18   130/84   --   --    02/01/20 2104   --   110   --   --   97   --    02/01/20 2103   99.3 (37.4)   109   18   142/90   --   --    02/01/20 2004   --   --   --   --   99   --    02/01/20 2003   99.3 (37.4)   110   18   144/96   --   --    02/01/20 1900   100.2 (37.9)   101   --   150/98   --   --    02/01/20 1845   --   102   --   142/92   98   --    02/01/20 1830   --   99   --   147/96   --   --    02/01/20 1815   99.6 (37.6)   103   18   139/93   98   --    02/01/20 1740   --   109   --   (!) 157/110   --   --    02/01/20 1735   --   (!) 123   --   (!) 167/111   --   --    02/01/20 1710   --   110   --   (!) 151/106   --   --    02/01/20 1640   --   118   --   (!) 155/102   --   --    02/01/20 1551   --   (!) 129   18   (!) 160/101   --   --    02/01/20 1525   (!) 102.5 (39.2) patient found to be hot to touch. Patient taken to stat ct   --   --   --   --   --    Temp: patient found to be hot to touch. Patient taken to stat ct at 02/01/20 1525    02/01/20 1455   99 (37.2)   115   18   138/89   --   --    02/01/20 1025   99.6 (37.6)   105   18   136/92   --   --    02/01/20 0355   98 (36.7)   90   18   141/97   --   --    02/01/20 0228   --   94   --   (!) 146/102   --   --    01/31/20 2210   --   85   --   130/95   --   --    01/31/20 2209   --   89   --   142/99   --   --    01/31/20 2105   98.2 (36.8)   95   18   (!) 148/104   --   --    01/31/20 1611   98 (36.7)   106   18   131/87   99   --    01/31/20 0800   98.6 (37)   101   16   123/66   --   --    01/31/20 0530   99.2 (37.3)   102   15   107/70   --   --    01/31/20 0431   98.4 (36.9)   105   16   127/79   --   --    01/31/20 0132   98.4 (36.9)   95   16   121/71   --   --    01/30/20 2115   100.2 (37.9)   --   --   --   --   --    01/30/20 2000   99.1 (37.3)   100   18   135/92   99   --    01/30/20 1710   98.6 (37)   105   18   129/78   --   --    01/30/20  1610   98.5 (36.9)   91   20   108/64   --   --    01/30/20 1549   99 (37.2)   99   20   106/65   --   --    01/30/20 1436   98.4 (36.9)   85   16   101/58   --   --    01/30/20 1405   99 (37.2)   96   18   107/61   --   --    01/30/20 1107   99.4 (37.4)   80   16   115/76   --   --    01/30/20 1035   98.4 (36.9)   80   18   126/73   --   --    01/30/20 0756   98.7 (37.1)   73   16   111/72   98   --    01/30/20 0730   99 (37.2)   78   18   115/76   98   --    01/30/20 0726   --   78   --   --   98   --    01/30/20 0721   --   77   --   --   99   --    01/30/20 0716   --   81   --   --   99   --    01/30/20 0711   --   86   --   --   100   --    01/30/20 0706   --   88   --   --   100   --    01/30/20 0701   --   76   --   --   99   --    01/30/20 0656   98.8 (37.1)   85   16   134/75   100   --    01/30/20 0342   --   102   --   --   (!) 87   --    01/30/20 0341   --   94   --   --   100   --    01/30/20 0111   --   81   --   105/58   99   --    01/29/20 2232   --   88   --   105/68   99   --    01/29/20 2115   --   100   --   117/78   100   --    01/29/20 2113   --   87   --   --   91   --    01/29/20 2110   --   96   --   --   100   --    01/29/20 2105   --   107   --   --   100   --    01/29/20 2101   --   103   --   --   --   --    01/29/20 2100   99 (37.2)   106   16   117/77   100   --    01/29/20 2055   --   110   --   --   100   --    01/29/20 2050   --   97   --   --   100   --    01/29/20 2045   --   110   --   111/86   100   --    01/29/20 2040   --   109   --   --   100   --    01/29/20 2035   --   118   --   --   100   --    01/29/20 2030   --   93   --   115/68   100   --    01/29/20 2025   --   (!) 125   --   --   99   --    01/29/20 2020   --   103   --   --   100   --    01/29/20 2015   --   100   --   121/66   99   --    01/29/20 2010   --   102   --   --   95   --    01/29/20 2005   --   97   --   --   97   --    01/29/20 2002   --   107   --   121/58   96   --    01/29/20 2000   --   98   --    --   --   --    01/29/20 1955   --   99   --   --   95   --    01/29/20 1950   --   101   --   99/79   98   --    01/29/20 1948   --   99   --   --   --   --    01/29/20 1946   --   107   --   --   --   --    01/29/20 1945   --   108   --   94/76   96   --    01/29/20 1940   --   109   --   --   (!) 85   --    01/29/20 1920   --   76   --   118/75   --   --    01/29/20 1850   --   76   --   124/90   100   --    01/29/20 1845   --   64   --   121/89   100   --    01/29/20 1840   --   71   18   133/90   99   --    01/29/20 1835   --   88   22   136/90   100   --    01/29/20 1830   --   71   21   133/85   100   --    01/29/20 1825   --   78   18   137/99   100   --    01/29/20 1820   --   72   18   123/84   100   --    01/29/20 1816   --   78   18   132/84   --   --    01/29/20 1810   --   79   18   127/96   100   --    01/29/20 1805   --   83   20   128/92   100   --    01/29/20 1800   --   90   18   126/81   96   --    01/29/20 1757   --   101   18   119/85   94   --    01/29/20 1753   98 (36.7)   107   20   114/85   96   --    01/29/20 1600   --   91   --   --   98   --    01/29/20 1558   --   105   --   (!) 81/42   --   --    01/29/20 1555   --   93   --   --   97   --    01/29/20 1554   --   91   --   (!) 71/38   --   --    01/29/20 1552   --   75   --   (!) 68/38   --   --    01/29/20 1550   --   (!) 129   --   --   100   --    01/29/20 1545   --   95   --   --   98   --    01/29/20 1544   --   100   --   103/59   --   --    01/29/20 1541   --   97   --   94/50   91   --    01/29/20 1540   --   85   --   (!) 84/41   100   --    01/29/20 1535   --   (!) 128   --   108/75   99   --    01/29/20 1530   --   108   --   --   98   --    01/29/20 1529   --   99   --   103/73   --   --    01/29/20 1524   --   101   --   99/54   94   --    01/29/20 1523   --   95   --   96/52   96   --    01/29/20 1521   --   96   --   (!) 84/47   --   --    01/29/20 1426   --   107   --   (!) 148/115   --   --    01/29/20 1411   --   82    --   130/73   --   --    01/29/20 1356   --   81   --   130/78   --   --    01/29/20 1342   --   69   --   122/80   --   --    01/29/20 1325   --   78   --   118/71   --   --    01/29/20 1310   --   73   --   117/76   --   --    01/29/20 1255   --   77   --   123/77   --   --    01/29/20 1240   --   74   --   117/73   --   --    01/29/20 1225   --   68   --   117/76   --   --    01/29/20 1210   --   74   --   116/71   --   --    01/29/20 1200   --   74   --   124/73   --   --    01/29/20 1140   --   80   --   120/75   --   --    01/29/20 1125   --   72   --   139/87   --   --    01/29/20 1110   --   74   --   131/88   --   --    01/29/20 1055   --   75   --   150/95   --   --    01/29/20 1040   --   73   --   136/92   --   --    01/29/20 1025   --   72   --   139/96   --   --    01/29/20 1010   --   90   --   134/89   --   --    01/29/20 0955   --   71   --   132/83   --   --    01/29/20 0940   --   75   --   122/76   --   --    01/29/20 0925   --   63   --   123/75   --   --    01/29/20 0919   --   69   --   125/76   --   --    01/29/20 0913   --   67   --   124/82   --   --    01/29/20 0911   --   63   --   129/85   --   --    01/29/20 0909   --   77   --   118/83   --   --    01/29/20 0907   --   (!) 47   --   118/83   --   --    01/29/20 0905   --   88   --   101/63   --   --    01/29/20 0903   --   109   --   119/73   --   --    01/29/20 0901   --   81   --   124/76   --   --    01/29/20 0859   --   88   --   130/91   --   --    01/29/20 0857   --   95   --   130/76   --   --    01/29/20 0855   --   90   --   128/79   --   --    01/29/20 0854   --   60   --   131/80   --   --    01/29/20 0851   --   71   --   151/98   --   --    01/29/20 0849   --   73   --   151/95   --   --    01/29/20 0847   --   82   --   146/93   --   --    01/29/20 0845   --   72   --   158/97   --   --    01/29/20 0815   --   61   --   144/82   --   --    01/29/20 0745   --   65   --   137/82   --   --    01/29/20 0715   --   64   --    132/84   --   --    01/29/20 0526   --   --   --   --   --   67.9 (149.7)    01/29/20 0525   --   --   --   --   --   67.9 (149.7)    01/29/20 0519   --   63   --   124/83   --   --    01/29/20 0515   98.5 (36.9)   80   18   129/94   --   --

## 2020-02-04 NOTE — NURSING NOTE
Discharge Planning Assessment   Kareen Sanchez     Patient Name: Angela Clemente  MRN: [id number]  Today's Date: 2/4/2020    Admit Date: 1/29/2020    Discharge Needs Assessment     Row Name 02/04/20 1410       Living Environment    Lives With  spouse;child(sam), dependent    Name(s) of Who Lives With Patient  Zach-      Current Living Arrangements  home/apartment/condo    Primary Care Provided by  self    Provides Primary Care For  child(sam)    Family Caregiver if Needed  spouse    Family Caregiver Names  Zach    Quality of Family Relationships  supportive    Able to Return to Prior Arrangements  yes       Resource/Environmental Concerns    Resource/Environmental Concerns  none    Transportation Concerns  car, none       Transition Planning    Patient/Family Anticipates Transition to  home;home with family    Patient/Family Anticipated Services at Transition  none    Transportation Anticipated  family or friend will provide       Discharge Needs Assessment    Readmission Within the Last 30 Days  no previous admission in last 30 days    Concerns to be Addressed  no discharge needs identified    Equipment Currently Used at Home  none    Anticipated Changes Related to Illness  none    Equipment Needed After Discharge  none    Provided post acute provider list?  N/A    N/A Provider List Comment  Pt plans to d/c home with no further needs         Discharge Plan     Row Name 02/04/20 1412       Plan    Plan  d/c home with family    Patient/Family in Agreement with Plan  yes    Plan Comments  Into room to interview pt and her .   device used.  Facesheet verified.  Pt lives in a home with her  and children.  Pt states she has all supplies needed for baby such as crib, carseat,etc.  She has her  to help when she goes home, but his father and mother are coming in from [place] to help as well.  Pt denies any problems getting to pharmacy or paying for medications and denies any d/c needs  or concerns.  Pt's nurse in room to discuss clinical plans and administer abx.  CM will continue to follow.          Destination      Coordination has not been started for this encounter.      Durable Medical Equipment      Coordination has not been started for this encounter.      Dialysis/Infusion      Coordination has not been started for this encounter.      Home Medical Care      Coordination has not been started for this encounter.      Therapy      Coordination has not been started for this encounter.      Community Resources      Coordination has not been started for this encounter.          Demographic Summary     Row Name 02/04/20 1402       General Information    Admission Type  inpatient    Arrived From  home    Referral Source  admission list    Reason for Consult  discharge planning    Preferred Language  English;Uzbek     Used During This Interaction  yes Interview pt and  via  device.          Functional Status     Row Name 02/04/20 1403       Functional Status    Usual Activity Tolerance  excellent    Current Activity Tolerance  moderate       Functional Status, IADL    Medications  independent    Meal Preparation  independent    Housekeeping  independent    Laundry  independent    Shopping  independent       Mental Status    General Appearance WDL  WDL       Mental Status Summary    Recent Changes in Mental Status/Cognitive Functioning  no changes        Psychosocial    No documentation.       Abuse/Neglect    No documentation.       Legal    No documentation.       Substance Abuse    No documentation.       Patient Forms    No documentation.           Cole Mcleod RN

## 2020-02-04 NOTE — PLAN OF CARE
Discharge Planning Assessment   Kareen Sanchez     Patient Name: Angela Clemente  MRN: [id number]  Today's Date: 2/4/2020    Admit Date: 1/29/2020    Discharge Needs Assessment       Row Name 02/04/20 1410       Living Environment    Lives With  spouse;child(sam), dependent    Name(s) of Who Lives With Patient  Zach-      Current Living Arrangements  home/apartment/condo    Primary Care Provided by  self    Provides Primary Care For  child(sam)    Family Caregiver if Needed  spouse    Family Caregiver Names  Zach    Quality of Family Relationships  supportive    Able to Return to Prior Arrangements  yes       Resource/Environmental Concerns    Resource/Environmental Concerns  none    Transportation Concerns  car, none       Transition Planning    Patient/Family Anticipates Transition to  home;home with family    Patient/Family Anticipated Services at Transition  none    Transportation Anticipated  family or friend will provide       Discharge Needs Assessment    Readmission Within the Last 30 Days  no previous admission in last 30 days    Concerns to be Addressed  no discharge needs identified    Equipment Currently Used at Home  none    Anticipated Changes Related to Illness  none    Equipment Needed After Discharge  none    Provided post acute provider list?  N/A    N/A Provider List Comment  Pt plans to d/c home with no further needs             Discharge Plan       Row Name 02/04/20 1412       Plan    Plan  d/c home with family    Patient/Family in Agreement with Plan  yes    Plan Comments  Into room to interview pt and her .   device used.  Facesheet verified.  Pt lives in a home with her  and children.  Pt states she has all supplies needed for baby such as crib, carseat,etc.  She has her  to help when she goes home, but his father and mother are coming in from [place] to help as well.  Pt denies any problems getting to pharmacy or paying for medications and denies any d/c  needs or concerns.  Pt's nurse in room to discuss clinical plans and administer abx.  CM will continue to follow.              Destination        Coordination has not been started for this encounter.          Durable Medical Equipment        Coordination has not been started for this encounter.          Dialysis/Infusion        Coordination has not been started for this encounter.          Home Medical Care        Coordination has not been started for this encounter.          Therapy        Coordination has not been started for this encounter.          Community Resources        Coordination has not been started for this encounter.              Demographic Summary       Row Name 02/04/20 1403       General Information    Admission Type  inpatient    Arrived From  home    Referral Source  admission list    Reason for Consult  discharge planning    Preferred Language  English;Wallisian     Used During This Interaction  yes Interview pt and  via  device.              Functional Status       Row Name 02/04/20 1401       Functional Status    Usual Activity Tolerance  excellent    Current Activity Tolerance  moderate       Functional Status, IADL    Medications  independent    Meal Preparation  independent    Housekeeping  independent    Laundry  independent    Shopping  independent       Mental Status    General Appearance WDL  WDL       Mental Status Summary    Recent Changes in Mental Status/Cognitive Functioning  no changes            Psychosocial    No documentation.         Abuse/Neglect    No documentation.         Legal    No documentation.         Substance Abuse    No documentation.         Patient Forms    No documentation.             Cole Mcleod RN

## 2020-02-04 NOTE — PROGRESS NOTES
"Patient Care Team:  System, Provider Not In as PCP - General        Chief Complaint: PPD/POD #5    Subjective    Interval History and ROS:     Patient States She feel much better. She reports scant bleeding.   Patient Complaints: Minimal abdominal pain. \"Pus\" in her urine.   Patient Denies:  HA, vision changes, uncontrolled pain  History taken from: patient chart    Ms. Clemente reports she is feeling fairly well today. She denies HA, vision changes, or uncontrolled pain. She reports urinating without difficulty. She had a bowel movement yesterday. She is tolerating a regular diet. Her pain is well controlled. She has minimal swelling. She is ambulating without difficulty. She denies feeling dizzy or lightheaded when ambulating. She is breastfeeding her infant. She denies uncontrolled abdominal pain.     Objective    Vital Signs  Temp:  [97.7 °F (36.5 °C)-100 °F (37.8 °C)] 98.7 °F (37.1 °C)  Heart Rate:  [] 87  Resp:  [16-18] 16  BP: (123-167)/() 138/95    Flowsheet Rows      First Filed Value   Admission Height  154.9 cm (61\") Documented at 01/29/2020 0525   Admission Weight  67.9 kg (149 lb 11.2 oz) Documented at 01/29/2020 0525          Physical Exam:  General Appearance: alert, pleasant, cooperative and pale  Lungs: clear to auscultation, respirations regular, respirations even and respirations unlabored  Abdomen: normal bowel sounds and soft, hematoma marked and is noted to expand slightly beyond marking. New marking outlining the hematolma placed by RN. Area warm to touch but soft. Incision C/D/I.  Extremities: moves extremities well, no edema, no tenderness and Bear's sign negative  Skin: no bleeding, bruising or rash and turgor normal  Neurologic: Mental Status orientated to person, place, time and situation  Psych: normal    Results Review:     I reviewed the patient's new clinical results.    Lab Results (last 24 hours)     Procedure Component Value Units Date/Time    Urine Culture - Urine, " Urine, Clean Catch [[id number]] Collected:  02/03/20 1134    Specimen:  Urine, Clean Catch Updated:  02/03/20 1619    Urinalysis, Microscopic Only - Urine, Clean Catch [[id number]]  (Abnormal) Collected:  02/03/20 1134    Specimen:  Urine, Clean Catch Updated:  02/03/20 1156     RBC, UA 31-50 /HPF      WBC, UA 3-5 /HPF      Bacteria, UA 1+ /HPF      Squamous Epithelial Cells, UA 7-12 /HPF      Hyaline Casts, UA None Seen /LPF      Methodology Manual Light Microscopy    Urinalysis With Microscopic If Indicated (No Culture) - Urine, Clean Catch [[id number]]  (Abnormal) Collected:  02/03/20 1134    Specimen:  Urine, Clean Catch Updated:  02/03/20 1145     Color, UA Dark Yellow     Appearance, UA Slightly Cloudy     pH, UA 8.5     Specific Gravity, UA 1.020     Glucose, UA Negative     Ketones, UA Negative     Bilirubin, UA Negative     Blood, UA Large (3+)     Protein, UA >=300 mg/dL (3+)     Leuk Esterase, UA Trace     Nitrite, UA Positive     Urobilinogen, UA 2.0 E.U./dL    CBC (No Diff) [[id number]]  (Abnormal) Collected:  02/03/20 0525    Specimen:  Blood Updated:  02/03/20 0556     WBC 6.46 10*3/mm3      RBC 2.48 10*6/mm3      Hemoglobin 7.6 g/dL      Hematocrit 22.3 %      MCV 89.9 fL      MCH 30.6 pg      MCHC 34.1 g/dL      RDW 14.3 %      RDW-SD 45.8 fl      MPV 10.1 fL      Platelets 221 10*3/mm3           Imaging Results (Last 24 Hours)     ** No results found for the last 24 hours. **          Xray not reviewed personally by physician.      ECG not reviewed personally by physician  ECG/EMG Results (most recent)     None          Medication Review:   I have reviewed the patient's current medication list    Current Facility-Administered Medications:   •  diphenhydrAMINE (BENADRYL) capsule 25 mg, 25 mg, Oral, Q4H PRN **OR** diphenhydrAMINE (BENADRYL) injection 25 mg, 25 mg, Intravenous, Q4H PRN **OR** diphenhydrAMINE (BENADRYL) injection 25 mg, 25 mg, Intramuscular, Q4H PRN, Lizzy Villalobos CRNA  •  docusate  sodium (COLACE) capsule 100 mg, 100 mg, Oral, BID PRN, Riana Murray, , 100 mg at 02/01/20 0223  •  famotidine (PEPCID) tablet 20 mg, 20 mg, Oral, BID, Riana Murray, DO, 20 mg at 02/03/20 0850  •  ferrous gluconate (FERGON) tablet 324 mg, 324 mg, Oral, Daily With Breakfast, Riana Murray DO, 324 mg at 02/03/20 0850  •  labetalol (NORMODYNE) tablet 200 mg, 200 mg, Oral, Q12H, Riana Murray DO, 200 mg at 02/03/20 0850  •  lactated ringers infusion, 75 mL/hr, Intravenous, Continuous, Riana Murray DO, Stopped at 02/02/20 2237  •  lactobacillus acidophilus (RISAQUAD) capsule 1 capsule, 1 capsule, Oral, Daily, Tingle, María VARGAS, APRN, 1 capsule at 02/03/20 0850  •  levoFLOXacin (LEVAQUIN) 500 mg/100 mL D5W (premix) (LEVAQUIN) 500 mg, 500 mg, Intravenous, Q24H, Enriqueta Ybarra MD, 500 mg at 02/03/20 1954  •  [COMPLETED] magnesium sulfate bolus from bag 0.04 g/mL 6 g, 6 g, Intravenous, Once, Last Rate: 300 mL/hr at 02/01/20 1805, 6 g at 02/01/20 1805 **FOLLOWED BY** magnesium sulfate 20 GM/500ML infusion, 2 g/hr, Intravenous, Continuous, Riana Murray DO, Stopped at 02/02/20 1805  •  metroNIDAZOLE (FLAGYL) 500 mg/100mL IVPB, 500 mg, Intravenous, Q8H, Enriqueta Ybarra MD  •  oxyCODONE-acetaminophen (PERCOCET)  MG per tablet 1 tablet, 1 tablet, Oral, Q4H PRN, Riana Murray DO, 1 tablet at 02/03/20 1133  •  oxyCODONE-acetaminophen (PERCOCET) 5-325 MG per tablet 1 tablet, 1 tablet, Oral, Q4H PRN, Riana Murray DO, 1 tablet at 02/03/20 0032  •  simethicone (MYLICON) chewable tablet 80 mg, 80 mg, Oral, 4x Daily PRN, Riana Murray, DO, 80 mg at 01/31/20 1553  •  sodium chloride 0.9 % infusion, 50 mL/hr, Intravenous, Continuous, George Hanson MD  •  witch hazel-glycerin (TUCKS) pad, , Topical, PRN, George Hanson MD      Assessment/Plan     1) PPD#5/POD#5: Tolerating a regular diet. Her pain is controlled. She had a bowel movment. She is voiding  without difficulty. She is ambulating.      2) Elevated blood pressure/Suspected severe preeclampsia: Has completed 24 hrs magnesium sulfate. BP still moderately high on Labetalol 100mg BID. 24 hr urine= 385.     3) Fever: Etiology of fever is unknown but likely due to the hematoma.  Pt is getting  IV Flagyl and Levaquin. Will add probiotic for gut health.  Patient's last fever was 100.8 at 23:34 2/1/20. WBC 6K.     4) Pelvic hematoma: Large pelvic hematoma noted on CT scan. The borders of the hematoma have expanded slightly and have been marked. Patient is asymptomatic with the hematoma.  She denies any pain.  Dr. Hanson at bedside to assess the hematoma. Will continue to watch the hematoma at this time.     5) Acute blood loss anemia: In total patient lost approximately 4 L of blood from delivery and the supracervical hysterectomy.  She has received 6 units of packed red blood cells and 4 units of FFP.  Hemoglobin is steady at 7.6g/dL.    6) Thrombocytopenia: Continues to improve. Pt's platelets are now 221.    7) Urinary symptoms- Check UA.     8) Male infant- Breast feeding. Pt is pumping and dumping d/t Flagyl.     Plan for disposition: Possibly later this week when deemed stable    María Smith, APRN  02/03/20  8:25 PM      Time: More than 50% of time spent in counseling and coordination of care:  Total face-to-face/floor time 30 min.  Time spent in counseling 15 min. Counseling included the following topics: plan of care, medication, labs, etc.

## 2020-02-04 NOTE — PROGRESS NOTES
"Patient Care Team:  System, Provider Not In as PCP - General        Chief Complaint:  PPD #6/POD #6    Subjective    Interval History and ROS:     Patient States She feels well. She reports having 3 bowel movements.   Patient Complaints: None.   Patient Denies:  Heavy bleeding, chills, uncontrolled pain.   History taken from: patient chart      Objective    Vital Signs  Temp:  [97.7 °F (36.5 °C)-100 °F (37.8 °C)] 99.1 °F (37.3 °C)  Heart Rate:  [] 107  Resp:  [16-18] 18  BP: (138-154)/(92-98) 146/98    Flowsheet Rows      First Filed Value   Admission Height  154.9 cm (61\") Documented at 01/29/2020 0525   Admission Weight  67.9 kg (149 lb 11.2 oz) Documented at 01/29/2020 0525          Physical Exam:  General Appearance: alert, pleasant, appears stated age, cooperative and pale  Lungs: clear to auscultation, respirations regular, respirations even and respirations unlabored  Abdomen: upper abodmen soft, non tender, bowel sounds present. Hematoma noted and borders are marked with pen. Hematoma moderately firm and warm to touch. Incision C/D/I.   Extremities: moves extremities well, no edema, no tenderness and Bear's sign negative  Skin: no bleeding, bruising or rash and turgor normal  Neurologic: Mental Status orientated to person, place, time and situation  Psych: normal    Results Review:     I reviewed the patient's new clinical results.    Lab Results (last 24 hours)     Procedure Component Value Units Date/Time    CBC & Differential [[id number]] Collected:  02/04/20 0814    Specimen:  Blood Updated:  02/04/20 0824    Narrative:       The following orders were created for panel order CBC & Differential.  Procedure                               Abnormality         Status                     ---------                               -----------         ------                     CBC Auto Differential[[id number]]        Abnormal            Final result                 Please view results for these tests on the " individual orders.    CBC Auto Differential [[id number]]  (Abnormal) Collected:  02/04/20 0814    Specimen:  Blood Updated:  02/04/20 0824     WBC 6.93 10*3/mm3      RBC 2.76 10*6/mm3      Hemoglobin 8.4 g/dL      Hematocrit 24.9 %      MCV 90.2 fL      MCH 30.4 pg      MCHC 33.7 g/dL      RDW 14.6 %      RDW-SD 47.2 fl      MPV 9.6 fL      Platelets 270 10*3/mm3      Neutrophil % 68.6 %      Lymphocyte % 17.9 %      Monocyte % 11.3 %      Eosinophil % 0.9 %      Basophil % 0.1 %      Immature Grans % 1.2 %      Neutrophils, Absolute 4.76 10*3/mm3      Lymphocytes, Absolute 1.24 10*3/mm3      Monocytes, Absolute 0.78 10*3/mm3      Eosinophils, Absolute 0.06 10*3/mm3      Basophils, Absolute 0.01 10*3/mm3      Immature Grans, Absolute 0.08 10*3/mm3      nRBC 0.0 /100 WBC     Urine Culture - Urine, Urine, Clean Catch [[id number]] Collected:  02/03/20 1134    Specimen:  Urine, Clean Catch Updated:  02/03/20 1619    Urinalysis, Microscopic Only - Urine, Clean Catch [[id number]]  (Abnormal) Collected:  02/03/20 1134    Specimen:  Urine, Clean Catch Updated:  02/03/20 1156     RBC, UA 31-50 /HPF      WBC, UA 3-5 /HPF      Bacteria, UA 1+ /HPF      Squamous Epithelial Cells, UA 7-12 /HPF      Hyaline Casts, UA None Seen /LPF      Methodology Manual Light Microscopy    Urinalysis With Microscopic If Indicated (No Culture) - Urine, Clean Catch [[id number]]  (Abnormal) Collected:  02/03/20 1134    Specimen:  Urine, Clean Catch Updated:  02/03/20 1145     Color, UA Dark Yellow     Appearance, UA Slightly Cloudy     pH, UA 8.5     Specific Gravity, UA 1.020     Glucose, UA Negative     Ketones, UA Negative     Bilirubin, UA Negative     Blood, UA Large (3+)     Protein, UA >=300 mg/dL (3+)     Leuk Esterase, UA Trace     Nitrite, UA Positive     Urobilinogen, UA 2.0 E.U./dL          Imaging Results (Last 24 Hours)     ** No results found for the last 24 hours. **          Xray not reviewed personally by physician.      ECG not  reviewed personally by physician  ECG/EMG Results (most recent)     None          Medication Review:   I have reviewed the patient's current medication list    Current Facility-Administered Medications:   •  diphenhydrAMINE (BENADRYL) capsule 25 mg, 25 mg, Oral, Q4H PRN **OR** diphenhydrAMINE (BENADRYL) injection 25 mg, 25 mg, Intravenous, Q4H PRN **OR** diphenhydrAMINE (BENADRYL) injection 25 mg, 25 mg, Intramuscular, Q4H PRN, Lizzy Villalobos, CRNA  •  docusate sodium (COLACE) capsule 100 mg, 100 mg, Oral, BID PRN, Riana Murray, , 100 mg at 02/01/20 0223  •  famotidine (PEPCID) tablet 20 mg, 20 mg, Oral, BID, Riana Murray DO, 20 mg at 02/04/20 0828  •  ferrous gluconate (FERGON) tablet 324 mg, 324 mg, Oral, Daily With Breakfast, Riana Murray DO, 324 mg at 02/04/20 0827  •  labetalol (NORMODYNE) tablet 200 mg, 200 mg, Oral, Q12H, Riana Murray DO, 200 mg at 02/04/20 0827  •  lactated ringers infusion, 75 mL/hr, Intravenous, Continuous, Riana Murray DO, Stopped at 02/02/20 2237  •  lactobacillus acidophilus (RISAQUAD) capsule 1 capsule, 1 capsule, Oral, Daily, María Smith APRN, 1 capsule at 02/04/20 0827  •  levoFLOXacin (LEVAQUIN) 500 mg/100 mL D5W (premix) (LEVAQUIN) 500 mg, 500 mg, Intravenous, Q24H, Enriqueta Ybarra MD, 500 mg at 02/03/20 1954  •  [COMPLETED] magnesium sulfate bolus from bag 0.04 g/mL 6 g, 6 g, Intravenous, Once, Last Rate: 300 mL/hr at 02/01/20 1805, 6 g at 02/01/20 1805 **FOLLOWED BY** magnesium sulfate 20 GM/500ML infusion, 2 g/hr, Intravenous, Continuous, Riana Murray DO, Stopped at 02/02/20 1805  •  metroNIDAZOLE (FLAGYL) 500 mg/100mL IVPB, 500 mg, Intravenous, Q8H, Enriqueta Ybarra MD, 500 mg at 02/04/20 0537  •  oxyCODONE-acetaminophen (PERCOCET)  MG per tablet 1 tablet, 1 tablet, Oral, Q4H PRN, Riana Murray DO, 1 tablet at 02/03/20 1133  •  oxyCODONE-acetaminophen (PERCOCET) 5-325 MG per tablet 1 tablet, 1 tablet, Oral, Q4H  PRN, Riana Murray, DO, 1 tablet at 02/03/20 0032  •  simethicone (MYLICON) chewable tablet 80 mg, 80 mg, Oral, 4x Daily PRN, Riana Murray, DO, 80 mg at 01/31/20 1553  •  sodium chloride 0.9 % infusion, 50 mL/hr, Intravenous, Continuous, George Hanson MD  •  witch hazel-glycerin (TUCKS) pad, , Topical, PRN, George Hanson MD      Assessment/Plan     1) PPD#6/POD#6: She is ambulating and voiding without difficulty. She is tolerating a regular diet. She denies pain. She had a bowel movment.      2) Elevated blood pressure/Suspected severe preeclampsia: Has completed 24 hrs magnesium sulfate. 24 hr urine= 385. Labetalol has been increased to 200mg BID.      3) Fever: Etiology of fever is unknown but likely due to the hematoma or UTI. Culture is pending. Temperature was noted to be 100 this AM. Pt denies chills or feeling feverish Pt is getting  IV Flagyl and Levaquin. Will continue IV antibx d/t fluctuation of temperature.  WBC 6 this morning.     4) Pelvic hematoma: Large pelvic hematoma noted on CT scan. The borders of the hematoma appear stable within the border markings. The hematoma is moderately firm and warm to touch.  Patient is asymptomatic with the hematoma.  She denies any pain.  Dr. Ybarra at bedside to assess the hematoma. Will continue to watch the hematoma at this time, if continues to be an area of concern, will repeat CT scan tomorrow.      5) Acute blood loss anemia: In total patient lost approximately 4 L of blood from delivery and the supracervical hysterectomy.  She has received 6 units of packed red blood cells and 4 units of FFP.  Hemoglobin is steady at 8.4g/dL. She is taking oral iron.      6) Thrombocytopenia: Continues to improve. Pt's platelets are 270.     7) Urinary symptoms- UA nitrite +. Culture pending.  Pt on IV Levaquin.      8) Male infant- Breast feeding. Pt is pumping and dumping d/t Flagyl.     Plan for disposition:Possibly later this week, when  deemed stable     María Smith, APRN  02/04/20  9:05 AM      Time: More than 50% of time spent in counseling and coordination of care:  Total face-to-face/floor time 30 min.  Time spent in counseling 20 min. Counseling included the following topics: plan of care, labs, medication, hematoma

## 2020-02-04 NOTE — PLAN OF CARE
Problem: Patient Care Overview  Goal: Plan of Care Review  Outcome: Ongoing (interventions implemented as appropriate)  Flowsheets  Taken 2/4/2020 0515 by Pallavi Mac, RN  Progress: improving  Outcome Summary: vss, antibiotic therapy continues, up ad amparo, incision well approximated with no drainage, no c/o pain  Taken 2/3/2020 0900 by Deepthi Peña, RN  Plan of Care Reviewed With: patient;spouse

## 2020-02-05 ENCOUNTER — ANESTHESIA EVENT (OUTPATIENT)
Dept: PERIOP | Facility: HOSPITAL | Age: 20
End: 2020-02-05

## 2020-02-05 ENCOUNTER — ANESTHESIA (OUTPATIENT)
Dept: PERIOP | Facility: HOSPITAL | Age: 20
End: 2020-02-05

## 2020-02-05 ENCOUNTER — PREP FOR SURGERY (OUTPATIENT)
Dept: OTHER | Facility: HOSPITAL | Age: 20
End: 2020-02-05

## 2020-02-05 DIAGNOSIS — T14.8XXA HEMATOMA: Primary | ICD-10-CM

## 2020-02-05 LAB
BACTERIA SPEC AEROBE CULT: NO GROWTH
BASOPHILS # BLD AUTO: 0.02 10*3/MM3 (ref 0–0.2)
BASOPHILS NFR BLD AUTO: 0.3 % (ref 0–1.5)
DEPRECATED RDW RBC AUTO: 48 FL (ref 37–54)
EOSINOPHIL # BLD AUTO: 0.1 10*3/MM3 (ref 0–0.4)
EOSINOPHIL NFR BLD AUTO: 1.6 % (ref 0.3–6.2)
ERYTHROCYTE [DISTWIDTH] IN BLOOD BY AUTOMATED COUNT: 14.6 % (ref 12.3–15.4)
HCT VFR BLD AUTO: 25.3 % (ref 34–46.6)
HGB BLD-MCNC: 8.4 G/DL (ref 12–15.9)
IMM GRANULOCYTES # BLD AUTO: 0.04 10*3/MM3 (ref 0–0.05)
IMM GRANULOCYTES NFR BLD AUTO: 0.6 % (ref 0–0.5)
LYMPHOCYTES # BLD AUTO: 1.06 10*3/MM3 (ref 0.7–3.1)
LYMPHOCYTES NFR BLD AUTO: 16.6 % (ref 19.6–45.3)
MCH RBC QN AUTO: 30.5 PG (ref 26.6–33)
MCHC RBC AUTO-ENTMCNC: 33.2 G/DL (ref 31.5–35.7)
MCV RBC AUTO: 92 FL (ref 79–97)
MONOCYTES # BLD AUTO: 0.57 10*3/MM3 (ref 0.1–0.9)
MONOCYTES NFR BLD AUTO: 8.9 % (ref 5–12)
NEUTROPHILS # BLD AUTO: 4.58 10*3/MM3 (ref 1.7–7)
NEUTROPHILS NFR BLD AUTO: 72 % (ref 42.7–76)
NRBC BLD AUTO-RTO: 0 /100 WBC (ref 0–0.2)
PLATELET # BLD AUTO: 307 10*3/MM3 (ref 140–450)
PMV BLD AUTO: 9.7 FL (ref 6–12)
RBC # BLD AUTO: 2.75 10*6/MM3 (ref 3.77–5.28)
WBC NRBC COR # BLD: 6.37 10*3/MM3 (ref 3.4–10.8)

## 2020-02-05 PROCEDURE — 85025 COMPLETE CBC W/AUTO DIFF WBC: CPT | Performed by: OBSTETRICS & GYNECOLOGY

## 2020-02-05 PROCEDURE — 25010000002 PROPOFOL 10 MG/ML EMULSION: Performed by: ANESTHESIOLOGY

## 2020-02-05 PROCEDURE — 10140 I&D HMTMA SEROMA/FLUID COLLJ: CPT | Performed by: OBSTETRICS & GYNECOLOGY

## 2020-02-05 PROCEDURE — 25010000002 ONDANSETRON PER 1 MG: Performed by: ANESTHESIOLOGY

## 2020-02-05 PROCEDURE — S0260 H&P FOR SURGERY: HCPCS | Performed by: OBSTETRICS & GYNECOLOGY

## 2020-02-05 PROCEDURE — 25010000002 HYDROMORPHONE PER 4 MG: Performed by: ANESTHESIOLOGY

## 2020-02-05 PROCEDURE — 25010000002 FENTANYL CITRATE (PF) 100 MCG/2ML SOLUTION: Performed by: ANESTHESIOLOGY

## 2020-02-05 PROCEDURE — 0JC80ZZ EXTIRPATION OF MATTER FROM ABDOMEN SUBCUTANEOUS TISSUE AND FASCIA, OPEN APPROACH: ICD-10-PCS | Performed by: OBSTETRICS & GYNECOLOGY

## 2020-02-05 RX ORDER — ONDANSETRON 2 MG/ML
4 INJECTION INTRAMUSCULAR; INTRAVENOUS ONCE AS NEEDED
Status: DISCONTINUED | OUTPATIENT
Start: 2020-02-05 | End: 2020-02-05 | Stop reason: HOSPADM

## 2020-02-05 RX ORDER — HYDROMORPHONE HYDROCHLORIDE 1 MG/ML
0.5 INJECTION, SOLUTION INTRAMUSCULAR; INTRAVENOUS; SUBCUTANEOUS AS NEEDED
Status: DISCONTINUED | OUTPATIENT
Start: 2020-02-05 | End: 2020-02-05 | Stop reason: HOSPADM

## 2020-02-05 RX ORDER — SODIUM CHLORIDE 0.9 % (FLUSH) 0.9 %
1-10 SYRINGE (ML) INJECTION AS NEEDED
Status: DISCONTINUED | OUTPATIENT
Start: 2020-02-05 | End: 2020-02-05 | Stop reason: HOSPADM

## 2020-02-05 RX ORDER — KETAMINE HYDROCHLORIDE 10 MG/ML
INJECTION INTRAMUSCULAR; INTRAVENOUS AS NEEDED
Status: DISCONTINUED | OUTPATIENT
Start: 2020-02-05 | End: 2020-02-05 | Stop reason: SURG

## 2020-02-05 RX ORDER — SODIUM CHLORIDE 9 MG/ML
40 INJECTION, SOLUTION INTRAVENOUS AS NEEDED
Status: CANCELLED | OUTPATIENT
Start: 2020-02-05

## 2020-02-05 RX ORDER — HYDROMORPHONE HYDROCHLORIDE 1 MG/ML
1 INJECTION, SOLUTION INTRAMUSCULAR; INTRAVENOUS; SUBCUTANEOUS AS NEEDED
Status: DISCONTINUED | OUTPATIENT
Start: 2020-02-05 | End: 2020-02-05 | Stop reason: HOSPADM

## 2020-02-05 RX ORDER — FENTANYL CITRATE 50 UG/ML
INJECTION, SOLUTION INTRAMUSCULAR; INTRAVENOUS AS NEEDED
Status: DISCONTINUED | OUTPATIENT
Start: 2020-02-05 | End: 2020-02-05 | Stop reason: SURG

## 2020-02-05 RX ORDER — LEVOFLOXACIN 500 MG/1
500 TABLET, FILM COATED ORAL EVERY 24 HOURS
Status: DISCONTINUED | OUTPATIENT
Start: 2020-02-05 | End: 2020-02-07 | Stop reason: HOSPADM

## 2020-02-05 RX ORDER — SODIUM CHLORIDE 9 MG/ML
40 INJECTION, SOLUTION INTRAVENOUS AS NEEDED
Status: DISCONTINUED | OUTPATIENT
Start: 2020-02-05 | End: 2020-02-05 | Stop reason: HOSPADM

## 2020-02-05 RX ORDER — SODIUM CHLORIDE, SODIUM LACTATE, POTASSIUM CHLORIDE, CALCIUM CHLORIDE 600; 310; 30; 20 MG/100ML; MG/100ML; MG/100ML; MG/100ML
100 INJECTION, SOLUTION INTRAVENOUS CONTINUOUS
Status: DISCONTINUED | OUTPATIENT
Start: 2020-02-05 | End: 2020-02-06

## 2020-02-05 RX ORDER — PROPOFOL 10 MG/ML
VIAL (ML) INTRAVENOUS AS NEEDED
Status: DISCONTINUED | OUTPATIENT
Start: 2020-02-05 | End: 2020-02-05 | Stop reason: SURG

## 2020-02-05 RX ORDER — MIDAZOLAM HYDROCHLORIDE 2 MG/2ML
1 INJECTION, SOLUTION INTRAMUSCULAR; INTRAVENOUS
Status: DISCONTINUED | OUTPATIENT
Start: 2020-02-05 | End: 2020-02-05 | Stop reason: HOSPADM

## 2020-02-05 RX ORDER — METRONIDAZOLE 500 MG/1
500 TABLET ORAL EVERY 12 HOURS
Status: DISCONTINUED | OUTPATIENT
Start: 2020-02-05 | End: 2020-02-07 | Stop reason: HOSPADM

## 2020-02-05 RX ORDER — MAGNESIUM HYDROXIDE 1200 MG/15ML
LIQUID ORAL AS NEEDED
Status: DISCONTINUED | OUTPATIENT
Start: 2020-02-05 | End: 2020-02-05 | Stop reason: HOSPADM

## 2020-02-05 RX ORDER — MIDAZOLAM HYDROCHLORIDE 2 MG/2ML
2 INJECTION, SOLUTION INTRAMUSCULAR; INTRAVENOUS
Status: DISCONTINUED | OUTPATIENT
Start: 2020-02-05 | End: 2020-02-05 | Stop reason: HOSPADM

## 2020-02-05 RX ORDER — ONDANSETRON 2 MG/ML
4 INJECTION INTRAMUSCULAR; INTRAVENOUS ONCE AS NEEDED
Status: COMPLETED | OUTPATIENT
Start: 2020-02-05 | End: 2020-02-05

## 2020-02-05 RX ORDER — BUPIVACAINE HYDROCHLORIDE 2.5 MG/ML
INJECTION, SOLUTION EPIDURAL; INFILTRATION; INTRACAUDAL AS NEEDED
Status: DISCONTINUED | OUTPATIENT
Start: 2020-02-05 | End: 2020-02-05 | Stop reason: HOSPADM

## 2020-02-05 RX ORDER — METRONIDAZOLE 500 MG/1
500 TABLET ORAL EVERY 12 HOURS SCHEDULED
Status: DISCONTINUED | OUTPATIENT
Start: 2020-02-05 | End: 2020-02-05

## 2020-02-05 RX ORDER — MEPERIDINE HYDROCHLORIDE 25 MG/ML
12.5 INJECTION INTRAMUSCULAR; INTRAVENOUS; SUBCUTANEOUS
Status: DISCONTINUED | OUTPATIENT
Start: 2020-02-05 | End: 2020-02-05 | Stop reason: HOSPADM

## 2020-02-05 RX ORDER — SODIUM CHLORIDE 0.9 % (FLUSH) 0.9 %
3 SYRINGE (ML) INJECTION EVERY 12 HOURS SCHEDULED
Status: CANCELLED | OUTPATIENT
Start: 2020-02-05

## 2020-02-05 RX ORDER — SODIUM CHLORIDE 0.9 % (FLUSH) 0.9 %
1-10 SYRINGE (ML) INJECTION AS NEEDED
Status: CANCELLED | OUTPATIENT
Start: 2020-02-05

## 2020-02-05 RX ORDER — LIDOCAINE HYDROCHLORIDE 20 MG/ML
INJECTION, SOLUTION INFILTRATION; PERINEURAL AS NEEDED
Status: DISCONTINUED | OUTPATIENT
Start: 2020-02-05 | End: 2020-02-05 | Stop reason: SURG

## 2020-02-05 RX ORDER — SODIUM CHLORIDE 0.9 % (FLUSH) 0.9 %
3 SYRINGE (ML) INJECTION EVERY 12 HOURS SCHEDULED
Status: DISCONTINUED | OUTPATIENT
Start: 2020-02-05 | End: 2020-02-05 | Stop reason: HOSPADM

## 2020-02-05 RX ORDER — SODIUM CHLORIDE, SODIUM LACTATE, POTASSIUM CHLORIDE, CALCIUM CHLORIDE 600; 310; 30; 20 MG/100ML; MG/100ML; MG/100ML; MG/100ML
9 INJECTION, SOLUTION INTRAVENOUS CONTINUOUS PRN
Status: DISCONTINUED | OUTPATIENT
Start: 2020-02-05 | End: 2020-02-05 | Stop reason: HOSPADM

## 2020-02-05 RX ADMIN — ONDANSETRON 4 MG: 2 INJECTION, SOLUTION INTRAMUSCULAR; INTRAVENOUS at 10:22

## 2020-02-05 RX ADMIN — FAMOTIDINE 20 MG: 20 TABLET ORAL at 08:14

## 2020-02-05 RX ADMIN — FERROUS GLUCONATE TAB 324 MG (37.5 MG ELEMENTAL IRON) 324 MG: 324 (37.5 FE) TAB at 08:14

## 2020-02-05 RX ADMIN — KETAMINE HYDROCHLORIDE 20 MG: 10 INJECTION, SOLUTION INTRAMUSCULAR; INTRAVENOUS at 10:34

## 2020-02-05 RX ADMIN — Medication 1 CAPSULE: at 08:14

## 2020-02-05 RX ADMIN — SODIUM CHLORIDE, POTASSIUM CHLORIDE, SODIUM LACTATE AND CALCIUM CHLORIDE 100 ML/HR: 600; 310; 30; 20 INJECTION, SOLUTION INTRAVENOUS at 12:34

## 2020-02-05 RX ADMIN — METRONIDAZOLE 500 MG: 500 TABLET ORAL at 17:45

## 2020-02-05 RX ADMIN — LEVOFLOXACIN 500 MG: 500 TABLET, FILM COATED ORAL at 21:12

## 2020-02-05 RX ADMIN — LIDOCAINE HYDROCHLORIDE 100 MG: 20 INJECTION, SOLUTION INFILTRATION; PERINEURAL at 10:32

## 2020-02-05 RX ADMIN — OXYCODONE HYDROCHLORIDE AND ACETAMINOPHEN 1 TABLET: 5; 325 TABLET ORAL at 21:14

## 2020-02-05 RX ADMIN — LABETALOL HYDROCHLORIDE 200 MG: 100 TABLET, FILM COATED ORAL at 21:12

## 2020-02-05 RX ADMIN — FENTANYL CITRATE 25 MCG: 50 INJECTION, SOLUTION INTRAMUSCULAR; INTRAVENOUS at 10:34

## 2020-02-05 RX ADMIN — SODIUM CHLORIDE, POTASSIUM CHLORIDE, SODIUM LACTATE AND CALCIUM CHLORIDE 9 ML/HR: 600; 310; 30; 20 INJECTION, SOLUTION INTRAVENOUS at 10:20

## 2020-02-05 RX ADMIN — METRONIDAZOLE 500 MG: 500 INJECTION, SOLUTION INTRAVENOUS at 05:32

## 2020-02-05 RX ADMIN — HYDROMORPHONE HYDROCHLORIDE 0.5 MG: 1 INJECTION, SOLUTION INTRAMUSCULAR; INTRAVENOUS; SUBCUTANEOUS at 11:44

## 2020-02-05 RX ADMIN — PROPOFOL 150 MG: 10 INJECTION, EMULSION INTRAVENOUS at 10:32

## 2020-02-05 RX ADMIN — LABETALOL HYDROCHLORIDE 200 MG: 100 TABLET, FILM COATED ORAL at 08:14

## 2020-02-05 RX ADMIN — FENTANYL CITRATE 25 MCG: 50 INJECTION, SOLUTION INTRAMUSCULAR; INTRAVENOUS at 10:30

## 2020-02-05 NOTE — PLAN OF CARE
Problem: Patient Care Overview  Goal: Plan of Care Review  Outcome: Ongoing (interventions implemented as appropriate)  Flowsheets  Taken 2/4/2020 0515 by Pallavi Mac, RN  Progress: improving  Taken 2/4/2020 1924 by Hazel Thompson RN  Plan of Care Reviewed With: patient;spouse;family  Taken 2/5/2020 0640 by Hazel Thompson, RN  Outcome Summary: VSS, IV antibiotics continued, up ad amparo, incision well approximated w/no drainage, hematoma has increased in size, firm and warm to the touch, patient denies pain, cbc drawn in AM, planned CT scan in the AM,

## 2020-02-05 NOTE — ANESTHESIA POSTPROCEDURE EVALUATION
Patient: Angela Clemente    Procedure Summary     Date:  02/05/20 Room / Location:  [facility] OR 4 /  [facility] OR    Anesthesia Start:  1026 Anesthesia Stop:  1109    Procedure:  WOUND EXPLORATION TRUNK with evacutaion of hematoma (N/A ) Diagnosis:       Hematoma      (Hematoma [T14.8XXA])    Surgeon:  Riana Murray DO Provider:  Petrona Parrish MD    Anesthesia Type:  general ASA Status:  2          Anesthesia Type: general    Vitals  Vitals Value Taken Time   /108 2/5/2020 11:55 AM   Temp     Pulse 80 2/5/2020 11:57 AM   Resp 18 2/5/2020 11:45 AM   SpO2 96 % 2/5/2020 11:57 AM   Vitals shown include unvalidated device data.        Post Anesthesia Care and Evaluation    Patient location during evaluation: PHASE II  Patient participation: complete - patient participated  Level of consciousness: awake and alert  Pain score: 2  Pain management: satisfactory to patient  Airway patency: patent  Anesthetic complications: No anesthetic complications  PONV Status: none  Cardiovascular status: acceptable  Respiratory status: acceptable  Hydration status: acceptable

## 2020-02-05 NOTE — ANESTHESIA PREPROCEDURE EVALUATION
Anesthesia Evaluation     Patient summary reviewed and Nursing notes reviewed   NPO Solid Status: N/A  NPO Liquid Status: N/A           Airway   Mallampati: I  TM distance: >3 FB  Neck ROM: full  No difficulty expected  Dental - normal exam     Pulmonary - normal exam    breath sounds clear to auscultation  Cardiovascular - normal exam    Rhythm: regular  Rate: normal    (+) hypertension well controlled less than 2 medications,       Neuro/Psych  GI/Hepatic/Renal/Endo    GERD: resolved since delivery.    Musculoskeletal (-) negative ROS    Abdominal  - normal exam   Substance History - negative use     OB/GYN    Gestational age approximate: delivered.        Other - negative ROS                       Anesthesia Plan    ASA 2     general     intravenous induction     Anesthetic plan, all risks, benefits, and alternatives have been provided, discussed and informed consent has been obtained with: patient ([employer], [employer] ([id number]).).

## 2020-02-05 NOTE — OP NOTE
Subjective     Date of Service:  02/05/20  Time of Service:  11:21 AM    Surgical Staff: Surgeon(s) and Role:     * Riana Murray, DO - Primary   Additional Staff: none   Pre-operative diagnosis(es): Pre-Op Diagnosis Codes:     * Hematoma [T14.8XXA]     Post-operative diagnosis(es): Post-Op Diagnosis Codes:     * Hematoma [T14.8XXA]   Procedure(s): Procedure(s):  WOUND EXPLORATION TRUNK with evacutaion of hematoma     Antibiotics: Pt on Levo/Flagyl     Anesthesia: Type: Choice  ASA:  II     Objective      Operative findings: Old blood in subcutaneous area of entire length of wound.  Old blood was not organized into a clot.  The blood was noted to be liquid and easily expressed when the incision was opened.  The area of induration noted on the abdomen from inflammation.   Specimens removed: None   Fluid Intake: 100mL   Output: Documented Output  Est. Blood Loss 5mL      I/O this shift:  In: 100 [I.V.:100]  Out: 5 [Blood:5]     Blood products used: No   Drains: [REMOVED] Urethral Catheter Silicone 16 Fr. (Removed)       [REMOVED] Urethral Catheter Silicone 16 Fr. (Removed)   Daily Indications Selected surgeries ( tract, abdomen) 1/30/2020  7:00 AM   Site Assessment Clean;Skin intact 1/30/2020  7:00 AM   Collection Container Standard drainage bag 1/30/2020  7:00 AM   Securement Method Securing device 1/30/2020  7:00 AM   Catheter care complete Yes 1/30/2020  7:00 AM   Output (mL) 1000 mL 1/30/2020  5:30 PM       [REMOVED] Urethral Catheter Silicone 16 Fr. (Removed)   Daily Indications Selected surgeries ( tract, abdomen) 2/2/2020  4:05 PM   Site Assessment Clean;Skin intact 2/2/2020  4:05 PM   Collection Container Standard drainage bag 2/2/2020  4:05 PM   Securement Method Securing device 2/2/2020  4:05 PM   Catheter care complete Yes 2/2/2020  4:05 PM   Output (mL) 350 mL 2/2/2020  2:05 PM      Implant Information: Nothing was implanted during the procedure   Complications: None apparent   Intraoperative  consult(s):    Condition: stable   Disposition: to PACU and then admit to  PP floor         Assessment/Plan     19-year-old -1-0-2 status post  complicated by uterine rupture and subsequent exploratory laparotomy with supracervical hysterectomy.  Patient's postoperative course has been complicated by 6 units of packed red blood cells and 2 units of FFP.  Additionally, patient has had severe preeclampsia and is status post magnesium sulfate for 24 hours and is currently on labetalol 100 mg p.o. twice daily.  Patient diagnosed several days ago with 2 large hematomas.  When hematoma is in her abdominal cavity measuring approximately 14 cm and another hematoma was noted to be in the subcutaneous area measuring approximately 12 cm.  Patient has done well on IV antibiotics and is afebrile.  Her white blood cell count is normal at 6K.  The hematoma in the subcutaneous area is very firm to palpation and although stable in size is not improving after several days on antibiotics.  Discussed with patient that we would open up the skin incision and remove the hematoma in the subcutaneous area only.  Patient is not having any pain and states she is not febrile will not explore intra-abdominally.  Plan reviewed with the patient and her  via .  After all questions were answered, the patient was taken to the operating room.  Placed under general anesthesia.  An LMA was placed.  The patient remained in supine position and was prepped and draped in normal sterile fashion.  Patient's been receiving Levaquin and Flagyl on a regular basis and therefore no additional IV antibiotics were administered.  Patient had SCDs on the lower extremities bilaterally.  Using a scalpel the previous exploratory laparotomy incision was incised.  Once it was incised a couple centimeters a copious amount of old blood was expressed.  The blood was of liquid form and not in an organized clot.  The rest of the incision  easily opened with manual dissection.  The blood was filling the entire length and depth of the incision.  Once the blood was removed the subcutaneous tissue was noted to be healthy.  The fascia was intact.  The areas of induration which is previously palpated on the abdomen was noted to be inflammation.  The area was irrigated.  The subcutaneous tissue was oozing from multiple areas.  Juan Jose was applied to the subcutaneous area which aided in hemostasis.  At this point the procedure was deemed over and the skin was reapproximated with staples.  Patient tolerated the procedure well.  There were no apparent complications.  Patient will return to the postpartum floor.  She will be started on oral antibiotics and IV antibiotics will be discontinued.  Patient is currently in stable condition.

## 2020-02-05 NOTE — ANESTHESIA PROCEDURE NOTES
Airway  Urgency: elective    Date/Time: 2/5/2020 10:33 AM  Airway not difficult    General Information and Staff    Patient location during procedure: OR  Anesthesiologist: Petrona Parrish MD    Indications and Patient Condition  Indications for airway management: airway protection    Preoxygenated: yes  MILS maintained throughout  Mask difficulty assessment: 1 - vent by mask    Final Airway Details  Final airway type: supraglottic airway      Successful airway: unique  Size 3    Number of attempts at approach: 1  Assessment: lips, teeth, and gum same as pre-op and atraumatic intubation

## 2020-02-05 NOTE — PROGRESS NOTES
"Adult Nutrition  Assessment/PES    Patient Name:  Angela Clemente  YOB: 2000  MRN: [id number]  Admit Date:  2020    Assessment Date:  2020    Comments:  Pt. tolerating diet with good intake.    Reason for Assessment     Row Name 20          Reason for Assessment    Reason For Assessment  other (see comments) LOS     Diagnosis  -- status post  complicated by uterine rupture         Nutrition/Diet History     Row Name 20 1743          Nutrition/Diet History    Factors Affecting Nutritional Intake  -- reports very good appetite, eating well, no problems chewing/swallowing         Anthropometrics     Row Name 20          Anthropometrics    Height  155.9 cm (61.38\")     Weight  -- 149.7# 20        Ideal Body Weight (IBW)    Ideal Body Weight (IBW) (kg)  49.01        Usual Body Weight (UBW)    Usual Body Weight  -- does not know pre-pregnancy weight but wt noted as 121.7# on 19 Weight gain appears to be at least 28#        Labs/Tests/Procedures/Meds     Row Name 20          Labs/Procedures/Meds    Lab Results Reviewed  reviewed        Medications    Pertinent Medications Reviewed  reviewed           Estimated/Assessed Needs     Row Name 20 180          Calculation Measurements    Height  155.9 cm (61.38\")         Nutrition Prescription Ordered     Row Name 20          Nutrition Prescription PO    Current PO Diet  Regular         Evaluation of Received Nutrient/Fluid Intake     Row Name 20          Fluid Intake Evaluation    Oral Fluid (mL)  -- insufficient data        PO Evaluation    % PO Intake  -- per nursing eating % of meals               Problem/Interventions:  Problem 1     Row Name 20          Nutrition Diagnoses Problem 1    Problem 1  No Nutrition Diagnosis at this Time               Intervention Goal     Row Name 20 180          Intervention Goal    PO  Maintain intake;PO intake (%)  "    PO Intake %  75 % or greater of meals         Nutrition Intervention     Row Name 02/05/20 1807          Nutrition Intervention    RD/Tech Action  Encourage intake;Interview for preference;Follow Tx progress           Education/Evaluation     Row Name 02/05/20 1807          Education    Education  -- no education needs identified        Monitor/Evaluation    Monitor  I&O;PO intake;Pertinent labs;Weight;Skin status           Electronically signed by:  Lizzy Villarreal RD  02/05/20 6:07 PM

## 2020-02-05 NOTE — H&P
Patient Care Team:  System, Provider Not In as PCP - General    Chief complaint wounds hematoma       HPI: 19-year-old -1-0-2 status post  complicated by uterine rupture and subsequent exploratory laparotomy with supracervical hysterectomy.  Patient's postoperative course has been complicated by 6 units of packed red blood cells and 2 units of FFP.  Additionally, patient has had severe preeclampsia and is status post magnesium sulfate for 24 hours and is currently on labetalol 100 mg p.o. twice daily.  Patient diagnosed several days ago with 2 large hematomas.  When hematoma is in her abdominal cavity measuring approximately 14 cm and another hematoma was noted to be in the subcutaneous area measuring approximately 12 cm.  Patient has done well on IV antibiotics and is afebrile.  Her white blood cell count is normal at 6K.  The hematoma in the subcutaneous area is very firm to palpation and although stable in size is not improving after several days on antibiotics.  Discussed with patient that we would open up the skin incision and remove the hematoma in the subcutaneous area only.  Patient is not having any pain and states she is not febrile will not explore intra-abdominally.  Plan reviewed with the patient and her  via .  All questions answered.    Debilities: None    Emotional Behavior: Appropriate    PMH:   Past Medical History:   Diagnosis Date   • Gestational hypertension     previous pregnancy   • History of migraine 2019         PSH:   Past Surgical History:   Procedure Laterality Date   •  SECTION     • EXPLORATORY LAPAROTOMY N/A 2020    Procedure: DIAGNOSTIC LAPAROSCOPY WITH TOTAL ABDOMINAL HYSTERECTOMY;  Surgeon: Riana Murray DO;  Location: [facility];  Service: Obstetrics/Gynecology       SoHx:   Social History     Socioeconomic History   • Marital status:      Spouse name: Not on file   • Number of children: Not on file   • Years of  education: Not on file   • Highest education level: Not on file   Tobacco Use   • Smoking status: Never Smoker   • Smokeless tobacco: Never Used   Substance and Sexual Activity   • Alcohol use: No     Frequency: Never   • Drug use: No   • Sexual activity: Yes     Partners: Male       FHx: History reviewed. No pertinent family history.      Allergies: Patient has no known allergies.    Medications:   No current facility-administered medications on file prior to encounter.      Current Outpatient Medications on File Prior to Encounter   Medication Sig Dispense Refill   • aspirin 81 MG tablet Take 1 tablet by mouth Daily. 30 tablet 6   • [facility] ASPIRIN ADULT LOW DOSE 81 MG chewable tablet      • ferrous sulfate 325 (65 FE) MG tablet Take 1 tablet by mouth Daily With Breakfast. 90 tablet 1   • Prenatal Vit-Fe Fumarate-FA (PRENATAL VITAMIN) 27-0.8 MG tablet Take  by mouth.     • famotidine (PEPCID) 20 MG tablet Take 20 mg by mouth 2 (Two) Times a Day.     • ondansetron (ZOFRAN) 4 MG tablet Take 1 tablet by mouth Daily As Needed for Nausea or Vomiting. 30 tablet 1   • pseudoephedrine-guaifenesin (MUCINEX D)  MG per 12 hr tablet Take 1 tablet by mouth Every 12 (Twelve) Hours. 14 tablet 0             Vital Signs  Temp:  [98.5 °F (36.9 °C)-100.5 °F (38.1 °C)] 98.8 °F (37.1 °C)  Heart Rate:  [] 84  Resp:  [18] 18  BP: (133-174)/() 149/103    Physical Exam:  General: NAD  Heart:: RRR  Lungs: clear  Abdomen: large firn ecchymotic area on abdomen measuring approx 15 x15cm. NT to palpation  Genitalia: deferred   Extremities: no calf tenderness  Psychological: AAOx3      Labs: Hgb 8.4      Assessment/Plan:  -year-old -1-0-2 status post  with uterine rupture and now status post supracervical hysterectomy with wound hematoma.  Proceed with exploration of wound with evacuation of hematoma.        I discussed the patients findings and my recommendations with patient and family.     Riana Murray,  DO  02/05/20  9:56 AM

## 2020-02-05 NOTE — PLAN OF CARE
Problem: Patient Care Overview  Goal: Plan of Care Review  Outcome: Ongoing (interventions implemented as appropriate)  Flowsheets (Taken 2/5/2020 4666)  Progress: improving  Plan of Care Reviewed With: patient  Outcome Summary: hematoma removal done today, pain stable     Problem: Postpartum (Vaginal Delivery) (Adult,Obstetrics,Pediatric)  Goal: Signs and Symptoms of Listed Potential Problems Will be Absent, Minimized or Managed (Postpartum)  Outcome: Ongoing (interventions implemented as appropriate)  Flowsheets (Taken 2/5/2020 1151)  Problems Assessed (Postpartum Vaginal Delivery): all  Problems Present (Postpartum Vag Deliv): pain

## 2020-02-05 NOTE — NURSING NOTE
Spoke to Dr. Murray regarding patient status. She stated to notify her if b/p is greater than 160/105. Orders to take out IV when patient is tolerating oral fluids and food.

## 2020-02-06 PROCEDURE — 99024 POSTOP FOLLOW-UP VISIT: CPT | Performed by: NURSE PRACTITIONER

## 2020-02-06 PROCEDURE — 94799 UNLISTED PULMONARY SVC/PX: CPT

## 2020-02-06 RX ADMIN — LABETALOL HYDROCHLORIDE 200 MG: 100 TABLET, FILM COATED ORAL at 20:30

## 2020-02-06 RX ADMIN — Medication 1 CAPSULE: at 08:46

## 2020-02-06 RX ADMIN — METRONIDAZOLE 500 MG: 500 TABLET ORAL at 05:57

## 2020-02-06 RX ADMIN — FERROUS GLUCONATE TAB 324 MG (37.5 MG ELEMENTAL IRON) 324 MG: 324 (37.5 FE) TAB at 08:46

## 2020-02-06 RX ADMIN — LEVOFLOXACIN 500 MG: 500 TABLET, FILM COATED ORAL at 20:30

## 2020-02-06 RX ADMIN — LABETALOL HYDROCHLORIDE 200 MG: 100 TABLET, FILM COATED ORAL at 08:46

## 2020-02-06 RX ADMIN — OXYCODONE HYDROCHLORIDE AND ACETAMINOPHEN 1 TABLET: 5; 325 TABLET ORAL at 22:26

## 2020-02-06 RX ADMIN — METRONIDAZOLE 500 MG: 500 TABLET ORAL at 18:20

## 2020-02-06 NOTE — PLAN OF CARE
Problem: Patient Care Overview  Goal: Plan of Care Review  Outcome: Ongoing (interventions implemented as appropriate)  Flowsheets (Taken 2/6/2020 3573)  Progress: improving  Plan of Care Reviewed With: patient  Outcome Summary: VS trended 130-150's/80-90's, minimal pain controlled with PO meds, PO antibiotics, afebrile, up ad amparo

## 2020-02-06 NOTE — PROGRESS NOTES
Patient: Angela Clemente  Procedure(s):  WOUND EXPLORATION TRUNK with evacutaion of hematoma  Anesthesia type: general    Patient location: Labor and Delivery  Last vitals:   Vitals:    02/06/20 0749   BP: 138/86   Pulse: 93   Resp: 16   Temp: 98.7 °F (37.1 °C)   SpO2: 98%     Level of consciousness: awake, alert and oriented    Post-anesthesia pain: adequate analgesia  Airway patency: patent  Respiratory: unassisted  Cardiovascular: stable and blood pressure at baseline  Hydration: euvolemic    Anesthetic complications: no

## 2020-02-06 NOTE — PLAN OF CARE
Problem: Patient Care Overview  Goal: Plan of Care Review  Outcome: Ongoing (interventions implemented as appropriate)  Flowsheets (Taken 2/6/2020 7406)  Progress: improving  Plan of Care Reviewed With: patient  Outcome Summary: VSS, pain controlled with prn med  Goal: Individualization and Mutuality  Outcome: Ongoing (interventions implemented as appropriate)  Goal: Discharge Needs Assessment  Outcome: Ongoing (interventions implemented as appropriate)  Goal: Interprofessional Rounds/Family Conf  Outcome: Ongoing (interventions implemented as appropriate)     Problem: Postpartum (Vaginal Delivery) (Adult,Obstetrics,Pediatric)  Goal: Signs and Symptoms of Listed Potential Problems Will be Absent, Minimized or Managed (Postpartum)  Outcome: Ongoing (interventions implemented as appropriate)     Problem: Hysterectomy (Adult)  Goal: Signs and Symptoms of Listed Potential Problems Will be Absent, Minimized or Managed (Hysterectomy)  Outcome: Ongoing (interventions implemented as appropriate)     Problem: Skin Injury Risk (Adult)  Goal: Identify Related Risk Factors and Signs and Symptoms  Outcome: Ongoing (interventions implemented as appropriate)  Goal: Skin Health and Integrity  Outcome: Ongoing (interventions implemented as appropriate)     Problem: Fall Risk (Adult)  Goal: Identify Related Risk Factors and Signs and Symptoms  Outcome: Ongoing (interventions implemented as appropriate)  Goal: Absence of Fall  Outcome: Ongoing (interventions implemented as appropriate)     Problem: Hypertensive Disorders in Pregnancy (Adult,Obstetrics,Pediatric)  Goal: Signs and Symptoms of Listed Potential Problems Will be Absent, Minimized or Managed (Hypertensive Disorders in Pregnancy)  Outcome: Ongoing (interventions implemented as appropriate)     Problem: Depression (Adult,Obstetrics,Pediatric)  Goal: Identify Related Risk Factors and Signs and Symptoms  Outcome: Ongoing (interventions implemented as appropriate)  Goal:  Establish/Maintain Self-Care  Outcome: Ongoing (interventions implemented as appropriate)  Goal: Improved/Stable Mood  Outcome: Ongoing (interventions implemented as appropriate)

## 2020-02-06 NOTE — PROGRESS NOTES
"  Patient Care Team:  System, Provider Not In as PCP - General        Chief Complaint:  PPD#8, Postop day #8 from uterine rupture with exploratory lap and supracervical hysterectomy, Postop day #1 WOUND EXPLORATION TRUNK with evacutaion of hematoma      Chief Complaint   Patient presents with   • Scheduled Induction     TOLAC         Subjective    Interval History and ROS:     Patient States she feels well.  She denies pain.  She is eating a regular diet.  She is voiding without difficulty.  She has had a bowel movement.  Patient Complaints: She is ready to go home.  Patient Denies: Uncontrolled pain or fever. Headache, vision changes, swelling or epigastric pain.  History taken from: patient chart family    Ms. Clemente reports she is feeling very good today. She denies HA, vision changes, or uncontrolled pain. She reports urinating without difficulty. She reports flatus and BM. She is tolerating a regular diet. Her pain is well controlled. She is ambulating without difficulty. She denies feeling dizzy or lightheaded when ambulating.  Her dressing is clean dry and intact this morning recommend that she remove her dressing later today.  She states she is ready to go home.  She is inquiring about travel to [place] to stay with her mother for care and help with the .  She is pumping her breast.     Objective    Vital Signs  Temp:  [98.3 °F (36.8 °C)-99.2 °F (37.3 °C)] 98.7 °F (37.1 °C)  Heart Rate:  [] 93  Resp:  [12-22] 16  BP: (124-154)/() 138/86    Flowsheet Rows      First Filed Value   Admission Height  154.9 cm (61\") Documented at 2020   Admission Weight  67.9 kg (149 lb 11.2 oz) Documented at 2020 0525          Physical Exam:  General Appearance: alert, pleasant, appears stated age and cooperative  Lungs: clear to auscultation, respirations regular, respirations even and respirations unlabored  Abdomen: normal bowel sounds, soft non-tender and [place] C/D/I.   Extremities: moves " extremities well, no edema, no tenderness and Bear's sign negative  Skin: no bleeding, bruising or rash  Psych: normal    Results Review:     I reviewed the patient's new clinical results.    Lab Results (last 24 hours)     Procedure Component Value Units Date/Time    Urine Culture - Urine, Urine, Clean Catch [[id number]]  (Normal) Collected:  02/03/20 1134    Specimen:  Urine, Clean Catch Updated:  02/05/20 1000     Urine Culture No growth          Imaging Results (Last 24 Hours)     ** No results found for the last 24 hours. **          Xray not reviewed personally by physician.      ECG not reviewed personally by physician  ECG/EMG Results (most recent)     None          Medication Review:   I have reviewed the patient's current medication list    Current Facility-Administered Medications:   •  diphenhydrAMINE (BENADRYL) capsule 25 mg, 25 mg, Oral, Q4H PRN **OR** diphenhydrAMINE (BENADRYL) injection 25 mg, 25 mg, Intravenous, Q4H PRN **OR** diphenhydrAMINE (BENADRYL) injection 25 mg, 25 mg, Intramuscular, Q4H PRN, Riana Murray DO  •  docusate sodium (COLACE) capsule 100 mg, 100 mg, Oral, BID PRN, Riana Murray DO, 100 mg at 02/01/20 0223  •  ferrous gluconate tablet 324 mg, 324 mg, Oral, Daily With Breakfast, Riana Murray DO, 324 mg at 02/06/20 0846  •  labetalol (NORMODYNE) tablet 200 mg, 200 mg, Oral, Q12H, Riana Murray DO, 200 mg at 02/06/20 0846  •  lactated ringers infusion, 75 mL/hr, Intravenous, Continuous, Riana Murray DO, Stopped at 02/02/20 2237  •  lactated ringers infusion, 100 mL/hr, Intravenous, Continuous, Petrona Parrish MD, Stopped at 02/05/20 1345  •  lactobacillus acidophilus (RISAQUAD) capsule 1 capsule, 1 capsule, Oral, Daily, Riana Murray DO, 1 capsule at 02/06/20 0846  •  levoFLOXacin (LEVAQUIN) tablet 500 mg, 500 mg, Oral, Q24H, Riana Murray DO, 500 mg at 02/05/20 2112  •  metroNIDAZOLE (FLAGYL) tablet 500 mg, 500 mg, Oral, Q12H, Tomi Cooney,  MD, 500 mg at 20 0557  •  oxyCODONE-acetaminophen (PERCOCET)  MG per tablet 1 tablet, 1 tablet, Oral, Q4H PRN, Riana Murray, DO, 1 tablet at 20 1133  •  oxyCODONE-acetaminophen (PERCOCET) 5-325 MG per tablet 1 tablet, 1 tablet, Oral, Q4H PRN, Riana Murray DO, 1 tablet at 20 2114  •  simethicone (MYLICON) chewable tablet 80 mg, 80 mg, Oral, 4x Daily PRN, Riana Murray DO, 80 mg at 20 1553  •  witch hazel-glycerin (TUCKS) pad, , Topical, PRN, Riana Murray DO    @DXC@    Assessment/Plan     [unfilled]    1) PPD#8/POD#8 c-hyst/POD#1 wound exploration with evacuation of hematoma: Tolerating a regular diet. She denies pain. She is having bowel movments. She is voiding without difficulty. She is ambulating. Dressing is C/D/I. Please remove dressing today.      2) Elevated blood pressure/Suspected severe preeclampsia: Has completed 24 hrs magnesium sulfate. BP stable Labetalol  200mg BID. 24 hr urine= 385. She denies HA, vision changes, swelling, etc. her  plans to buy a blood pressure cuff so they can monitor her BP at home.     3) Fever: Highest temp overnight was 99.2.  She is on oral Levaquin and Flagyl.  She is tolerating oral medications well.     4) Pelvic hematoma: She is postop day #1 from wound exploration with hematoma removal.  She is doing well.  She has no pain.  Her dressing is clean, dry, and intact.   Plan to remove dressing later today.     5) Acute blood loss anemia: Her last hemoglobin was stable at 8.4 g/dL.  She is hemodynamically stable.     6) Thrombocytopenia: Has resolved.  Last platelet count was 307 on 20.    7) Urinary symptoms-negative urine culture that resulted on 20.      8) Male infant- Breast feeding. Pt is pumping and dumping d/t Flagyl.      9) social-patient was planning on traveling to [place] to stay with her mother so her mother could provide care to her in the .  Discussed with patient that it was not  recommended nor safe for her to travel that far of a distance in the next month.  Recommended that patient's mother travel to her.  Patient has checked with immigration and reports that they will not allow her mother to enter the country unless the patient is gravely ill.  Discussed that possibly in the next 4 to 5 weeks patient would be in the clear for travel.    Plan for disposition: Consider home tomorrow.    SP Benavides  02/06/20  9:58 AM  9:58 AM  01/27/2020        Time: More than 50% of time spent in counseling and coordination of care:  Total face-to-face/floor time 30 min.  Time spent in counseling 20 min. Counseling included the following topics: plan of care, medicaiton, travel, discharge

## 2020-02-07 VITALS
HEART RATE: 79 BPM | OXYGEN SATURATION: 98 % | DIASTOLIC BLOOD PRESSURE: 87 MMHG | HEIGHT: 61 IN | BODY MASS INDEX: 28.26 KG/M2 | RESPIRATION RATE: 18 BRPM | TEMPERATURE: 98.8 F | SYSTOLIC BLOOD PRESSURE: 130 MMHG | WEIGHT: 149.7 LBS

## 2020-02-07 PROBLEM — Z34.90 PREGNANCY: Status: RESOLVED | Noted: 2019-09-16 | Resolved: 2020-02-07

## 2020-02-07 PROCEDURE — 99024 POSTOP FOLLOW-UP VISIT: CPT | Performed by: OBSTETRICS & GYNECOLOGY

## 2020-02-07 RX ORDER — LEVOFLOXACIN 500 MG/1
500 TABLET, FILM COATED ORAL EVERY 24 HOURS
Qty: 8 TABLET | Refills: 0 | Status: SHIPPED | OUTPATIENT
Start: 2020-02-07 | End: 2020-02-15

## 2020-02-07 RX ORDER — METRONIDAZOLE 500 MG/1
500 TABLET ORAL EVERY 12 HOURS
Qty: 16 TABLET | Refills: 0 | Status: SHIPPED | OUTPATIENT
Start: 2020-02-07 | End: 2020-02-15

## 2020-02-07 RX ORDER — LABETALOL 200 MG/1
200 TABLET, FILM COATED ORAL EVERY 12 HOURS SCHEDULED
Qty: 90 TABLET | Refills: 2 | Status: SHIPPED | OUTPATIENT
Start: 2020-02-07

## 2020-02-07 RX ORDER — OXYCODONE HYDROCHLORIDE AND ACETAMINOPHEN 5; 325 MG/1; MG/1
2 TABLET ORAL EVERY 4 HOURS PRN
Qty: 30 TABLET | Refills: 0 | Status: SHIPPED | OUTPATIENT
Start: 2020-02-07 | End: 2020-02-17

## 2020-02-07 RX ORDER — PSEUDOEPHEDRINE HCL 30 MG
100 TABLET ORAL 2 TIMES DAILY PRN
Qty: 30 EACH | Refills: 0 | Status: SHIPPED | OUTPATIENT
Start: 2020-02-07

## 2020-02-07 RX ORDER — FERROUS GLUCONATE 324(37.5)
324 TABLET ORAL
Qty: 100 TABLET | Refills: 0 | Status: SHIPPED | OUTPATIENT
Start: 2020-02-08

## 2020-02-07 RX ADMIN — METRONIDAZOLE 500 MG: 500 TABLET ORAL at 05:15

## 2020-02-07 RX ADMIN — LABETALOL HYDROCHLORIDE 200 MG: 100 TABLET, FILM COATED ORAL at 08:20

## 2020-02-07 RX ADMIN — Medication 1 CAPSULE: at 08:20

## 2020-02-07 RX ADMIN — FERROUS GLUCONATE TAB 324 MG (37.5 MG ELEMENTAL IRON) 324 MG: 324 (37.5 FE) TAB at 08:20

## 2020-02-07 NOTE — PLAN OF CARE
Problem: Patient Care Overview  Goal: Plan of Care Review  Outcome: Outcome(s) achieved  Flowsheets (Taken 2/7/2020 4206)  Progress: improving  Plan of Care Reviewed With: patient; significant other  Outcome Summary: vss, pain well controlled, incision well approximated CDI, ready for discharge

## 2020-02-07 NOTE — DISCHARGE SUMMARY
Date of Discharge:  2020    Discharge Diagnosis:   S/p successful , pp hemorrhage, pp supracervical hysterectomy, drainage of postop extrafascial hematoma.      Problem List:    Postpartum hemorrhage    Uses Maltese as primary spoken language    Desires  (vaginal birth after ) trial    Uterine rupture during labor    S/P abdominal supracervical subtotal hysterectomy    Hematoma      Presenting Problem/History of Present Illness  Desires  (vaginal birth after ) trial [O34.219]    Pt is desiring to go home.  Pt is postop day #9 from pp supracervical hysterectomy.  Pt has no major complaints.  Tolerating diet well.  Ambulating well.       ROS:   Review of Systems   Constitutional: Negative.    Respiratory: Negative.    Cardiovascular: Negative.    Gastrointestinal: Negative.    Genitourinary: Negative.    Neurological: Negative.    Psychiatric/Behavioral: Negative.        Hospital Course  Patient is a 19 y.o. female presented with desire for TOLAC.  Pt delivered vaginally and developed a pp hemorrhage that was attributed to a uterine rupture.  Pt underwent a supracervical hysterectomy with blood loss of around 2500cc.  Postop course was eventful for development of postop hematomas.  Pt underwent a I&D of an incisional hematoma and recovered well.  Pt was experience normal bowel function, bladder function and tolerating diet well the day of discharge.  Pt was discharged home with instructions and precuations.       Procedures Performed  Procedure(s):  WOUND EXPLORATION TRUNK with evacutaion of hematoma     Vaginal, Spontaneous     Consults:   Consults     No orders found from 2019 to 2020.          Pertinent Test Results:   Lab Results (last 24 hours)     ** No results found for the last 24 hours. **          Condition on Discharge:  satis    Vital Signs  Temp:  [98.3 °F (36.8 °C)-98.8 °F (37.1 °C)] 98.8 °F (37.1 °C)  Heart Rate:  [] 79  Resp:  [18] 18  BP:  (126-143)/(84-99) 130/87    Physical Exam:  Physical Exam   Constitutional: She is oriented to person, place, and time. She appears well-developed and well-nourished.   HENT:   Head: Normocephalic and atraumatic.   Eyes: EOM are normal.   Neck: Normal range of motion.   Pulmonary/Chest: Effort normal.   Abdominal: Soft. Bowel sounds are normal. She exhibits no distension and no mass. There is no tenderness. There is no rebound and no guarding. No hernia.   Incision clean dry and intact, staples left in for removal next week.  Minor ecchymosis.  Some induration.     Musculoskeletal: Normal range of motion.   Neurological: She is alert and oriented to person, place, and time.   Skin: Skin is warm and dry.   Psychiatric: She has a normal mood and affect. Her behavior is normal. Judgment and thought content normal.   Nursing note and vitals reviewed.        MEDICAL DECISION MAKING:     Discharge Disposition  Home or Self Care    Discharge Medications     Discharge Medications      New Medications      Instructions Start Date   docusate sodium 100 MG capsule   100 mg, Oral, 2 Times Daily PRN      ferrous gluconate 324 (37.5 Fe) MG tablet tablet  Replaces:  ferrous sulfate 325 (65 FE) MG tablet   324 mg, Oral, Daily With Breakfast   Start Date:  February 8, 2020     labetalol 200 MG tablet  Commonly known as:  NORMODYNE   200 mg, Oral, Every 12 Hours Scheduled      levoFLOXacin 500 MG tablet  Commonly known as:  LEVAQUIN   500 mg, Oral, Every 24 Hours      metroNIDAZOLE 500 MG tablet  Commonly known as:  FLAGYL   500 mg, Oral, Every 12 Hours      oxyCODONE-acetaminophen 5-325 MG per tablet  Commonly known as:  PERCOCET   2 tablets, Oral, Every 4 Hours PRN         Continue These Medications      Instructions Start Date   Prenatal Vitamin 27-0.8 MG tablet   Oral         Stop These Medications    aspirin 81 MG tablet     [facility] ASPIRIN ADULT LOW DOSE 81 MG chewable tablet  Generic drug:  aspirin     famotidine 20 MG  tablet  Commonly known as:  PEPCID     ferrous sulfate 325 (65 FE) MG tablet  Replaced by:  ferrous gluconate 324 (37.5 Fe) MG tablet tablet     ondansetron 4 MG tablet  Commonly known as:  ZOFRAN     pseudoephedrine-guaifenesin  MG per 12 hr tablet  Commonly known as:  MUCINEX D            Discharge Diet   Diet Instructions     Diet: Regular      Discharge Diet:  Regular          Activity at Discharge  Activity Instructions     Activity as Tolerated      Pelvic Rest            Follow-up Appointments  Future Appointments   Date Time Provider Department Center   2/10/2020 11:30 AM Enriqueta Ybarra MD [facility] None     Additional Instructions for the Follow-ups that You Need to Schedule     Discharge Follow-up with Specified Provider: any provider   As directed      To:  any provider    Follow Up Details:  bp check and staple removal               Time: More than 50% of time spent in counseling and/or coordination of care:  Total face-to-face/floor time 45 min.  Time spent in counseling 30 min. Counseling included the following topics with prognosis, differential diagnosis, risks, benefits of treatment, instructions, compliance and/or risk reduction and alternatives: postop and postpartum care.  Postpartum depression.  Wound care and need for staple removal soon.         Tomi Cooney MD  10:37 AM  2/7/2020

## 2020-02-07 NOTE — NURSING NOTE
Case Management Discharge Note      Final Note: d/c home with family    Provided post acute provider list?: N/A         Final Discharge Disposition Code: 01 - home or self-care

## 2020-02-07 NOTE — PAYOR COMM NOTE
"[facility]  [address] [place] [address]  Facility NPI: [id number]    Valentina Bell  Fax: [phone]  Phone: [phone] (Floridalma: [phone], Joanne: [phone])  Email: [email or web address]    Date: 2020  To: [employer]TH MEDICAID  Fax: [phone]  Subject: ADDITIONAL CLINICALS AND DISCHARGE SUMMARY   Reference #: [id number]  Please don't hesitate to contact me with any questions or concerns.    Elvi Clemente (19 y.o. Female)     Date of Birth Social Security Number Address Home Phone MRN    [id number]  [id number]  [facility] [address] [phone] [id number]    Confucianist Marital Status          Unknown        Admission Date Admission Type Admitting Provider Attending Provider Department, Room/Bed    20 [place], Tomi Santana MD  [facility],     Discharge Date Discharge Disposition Discharge Destination        2020 Home or Self Care              Attending Provider:  (none)   Allergies:  No Known Allergies    Isolation:  None   Infection:  None   Code Status:  Prior    Ht:  155.9 cm (61.38\")   Wt:  67.9 kg (149 lb 11.2 oz)    Admission Cmt:  None   Principal Problem:  Postpartum hemorrhage [O72.1] More...                 Active Insurance as of 2020     Primary Coverage     Payor Plan Insurance Group Employer/Plan Group    [place] [id number]     Payor Plan Address Payor Plan Phone Number Payor Plan Fax Number Effective Dates    [employer] Claims Office - [address] [address] [phone]  2020 - None Entered    [facility] [id number]       Subscriber Name Subscriber Birth Date Member ID       ELVI CLEMENTE 2000 [id number]                    History & Physical         Patient Care Team:  System, Provider Not In as PCP - General    Chief complaint wounds hematoma       HPI: 19-year-old -1-0-2 status post  complicated by uterine rupture and subsequent exploratory laparotomy with supracervical " hysterectomy.  Patient's postoperative course has been complicated by 6 units of packed red blood cells and 2 units of FFP.  Additionally, patient has had severe preeclampsia and is status post magnesium sulfate for 24 hours and is currently on labetalol 100 mg p.o. twice daily.  Patient diagnosed several days ago with 2 large hematomas.  When hematoma is in her abdominal cavity measuring approximately 14 cm and another hematoma was noted to be in the subcutaneous area measuring approximately 12 cm.  Patient has done well on IV antibiotics and is afebrile.  Her white blood cell count is normal at 6K.  The hematoma in the subcutaneous area is very firm to palpation and although stable in size is not improving after several days on antibiotics.  Discussed with patient that we would open up the skin incision and remove the hematoma in the subcutaneous area only.  Patient is not having any pain and states she is not febrile will not explore intra-abdominally.  Plan reviewed with the patient and her  via .  All questions answered.    Debilities: None    Emotional Behavior: Appropriate    PMH:   Past Medical History:   Diagnosis Date   • Gestational hypertension     previous pregnancy   • History of migraine 2019         PSH:   Past Surgical History:   Procedure Laterality Date   •  SECTION     • EXPLORATORY LAPAROTOMY N/A 2020    Procedure: DIAGNOSTIC LAPAROSCOPY WITH TOTAL ABDOMINAL HYSTERECTOMY;  Surgeon: Riana Murray DO;  Location: [facility];  Service: Obstetrics/Gynecology     FHx: History reviewed. No pertinent family history.      Allergies: Patient has no known allergies.    Medications:   No current facility-administered medications on file prior to encounter.      Current Outpatient Medications on File Prior to Encounter   Medication Sig Dispense Refill   • aspirin 81 MG tablet Take 1 tablet by mouth Daily. 30 tablet 6   • [facility] ASPIRIN ADULT LOW DOSE 81 MG chewable  tablet      • ferrous sulfate 325 (65 FE) MG tablet Take 1 tablet by mouth Daily With Breakfast. 90 tablet 1   • Prenatal Vit-Fe Fumarate-FA (PRENATAL VITAMIN) 27-0.8 MG tablet Take  by mouth.     • famotidine (PEPCID) 20 MG tablet Take 20 mg by mouth 2 (Two) Times a Day.     • ondansetron (ZOFRAN) 4 MG tablet Take 1 tablet by mouth Daily As Needed for Nausea or Vomiting. 30 tablet 1   • pseudoephedrine-guaifenesin (MUCINEX D)  MG per 12 hr tablet Take 1 tablet by mouth Every 12 (Twelve) Hours. 14 tablet 0             Vital Signs  Temp:  [98.5 °F (36.9 °C)-100.5 °F (38.1 °C)] 98.8 °F (37.1 °C)  Heart Rate:  [] 84  Resp:  [18] 18  BP: (133-174)/() 149/103    Physical Exam:  General: NAD  Heart:: RRR  Lungs: clear  Abdomen: large firn ecchymotic area on abdomen measuring approx 15 x15cm. NT to palpation  Genitalia: deferred   Extremities: no calf tenderness  Psychological: AAOx3      Labs: Hgb 8.4      Assessment/Plan:  -year-old -1-0-2 status post  with uterine rupture and now status post supracervical hysterectomy with wound hematoma.  Proceed with exploration of wound with evacuation of hematoma.        I discussed the patients findings and my recommendations with patient and family.     Riana Murray DO  20  9:56 AM    Patient Care Team:  System, Provider Not In as PCP - General    Chief complaint suspected uterine rupture       HPI: 19-year-old  status post a  with suspected uterine rupture.  Please see delivery note for further description.  She had an uncomplicated delivery of a baby boy.  She then began to have clots from the lower uterine segment that continued despite Methergine and evacuation of those clots.  Patient began to have some hypotensive episodes and a uterine rupture was suspected.  Patient was consented via  for exploratory laparotomy and possible hysterectomy.    Debilities: None    Emotional Behavior: Appropriate    PMH:   Past  Medical History:   Diagnosis Date   • Gestational hypertension     previous pregnancy   • History of migraine 2019         PSH:   Past Surgical History:   Procedure Laterality Date   •  SECTION         SoHx:   Social History     Socioeconomic History   • Marital status:      Spouse name: Not on file   • Number of children: Not on file   • Years of education: Not on file   • Highest education level: Not on file   Tobacco Use   • Smoking status: Never Smoker   • Smokeless tobacco: Never Used   Substance and Sexual Activity   • Alcohol use: No     Frequency: Never   • Drug use: No   • Sexual activity: Yes     Partners: Male       FHx: History reviewed. No pertinent family history.        Allergies: Patient has no known allergies.    Medications:   No current facility-administered medications on file prior to encounter.      Current Outpatient Medications on File Prior to Encounter   Medication Sig Dispense Refill   • aspirin 81 MG tablet Take 1 tablet by mouth Daily. 30 tablet 6   • [facility] ASPIRIN ADULT LOW DOSE 81 MG chewable tablet      • ferrous sulfate 325 (65 FE) MG tablet Take 1 tablet by mouth Daily With Breakfast. 90 tablet 1   • Prenatal Vit-Fe Fumarate-FA (PRENATAL VITAMIN) 27-0.8 MG tablet Take  by mouth.     • famotidine (PEPCID) 20 MG tablet Take 20 mg by mouth 2 (Two) Times a Day.     • ondansetron (ZOFRAN) 4 MG tablet Take 1 tablet by mouth Daily As Needed for Nausea or Vomiting. 30 tablet 1   • pseudoephedrine-guaifenesin (MUCINEX D)  MG per 12 hr tablet Take 1 tablet by mouth Every 12 (Twelve) Hours. 14 tablet 0             Vital Signs  Temp:  [98.5 °F (36.9 °C)] 98.5 °F (36.9 °C)  Heart Rate:  [] 91  Resp:  [18] 18  BP: ()/() 81/42    Physical Exam:  General: NAD. Pale appearing but alert  Heart:: no tachy  Lungs: clear  Abdomen: uterus firm and below umbilicus. Suspected rupture at the right side of uterus- thin feeling on palpation but no obvious  defect.  Genitalia: repair of vaginal and perineal lacerations  Extremities: no calf tenderness  Psychological: AAOx3      Labs: Hgb 12.3 to 9.3      Assessment/Plan: 18yo  s/p  with suspected uterine rupture.  Proceed to the OR in an emergent fashion for exploratory laparotomy with possible hysterectomy.        I discussed the patients findings and my recommendations with patient and family.     Riana Murray DO  20  6:09 PM        Electronically signed by Riana Murray DO at 20 1813     Riana Murray DO at 20 1204        19-year-old -1-0-1 presents at 39 weeks and 1 day for induction of labor.  Patient has a history of a prior  section at 33 weeks due to severe preeclampsia.  Patient had failure to progress and had a  section when she was 6 cm dilated.  Patient has been consented for .  She has been previously counseled as to the risk of uterine rupture.  Patient's prenatal care is also complicated by history of severe preeclampsia for which her baseline 24-hour urine was 156 mg of protein.  She is been taking aspirin daily.  There is been no evidence of preeclampsia during this pregnancy.  Patient has had anemia throughout the pregnancy and has been taking iron daily.  Her admission hemoglobin as 12.3.  Patient was found to be 3 cm dilated on her exam this morning.  Pitocin was started per protocol.  Patient received her epidural.  She was noted to be GBBS negative and artificial rupture membranes was performed with a copious amount of clear fluid noted.  Patient is presently 5 cm dilated and 70% effaced.  NST is reactive.  Patient is in stable condition.    Electronically signed by Riana Murray DO at 20 1207       Vital Signs (last 3 days) before discharge     Date/Time   Temp   Temp src   Pulse   Resp   BP   Patient Position   SpO2    20 0819   98.8 (37.1)   Oral   79   18   130/87   Sitting   --    20 0515   98.7 (37.1)    Oral   97   18   126/84   Lying   --    02/06/20 2030   98.3 (36.8)   Axillary   101   18   141/97   Lying   --    02/06/20 1616   98.4 (36.9)   Oral   89   18   143/99   Lying   98    02/06/20 0749   98.7 (37.1)   Oral   93   16   138/86   Lying   98    02/06/20 0625   --   --   80   --   132/88   --   --    02/06/20 0525   --   --   79   --   150/94   --   --    02/06/20 0425   99.2 (37.3)   Oral   77   12   134/94   Lying   --    02/06/20 0325   --   --   77   --   137/83   --   --    02/06/20 0225   --   --   78   --   138/95   --   --    02/06/20 0125   --   --   78   --   124/80   --   --    02/06/20 0025   --   --   83   --   128/85   --   --    02/05/20 2325   --   --   90   --   137/87   --   --    02/05/20 2225   --   --   98   --   147/93   --   --    02/05/20 2125   --   --   94   --   148/95   --   --    02/05/20 2044   98.5 (36.9)   Oral   101   12   147/99   Sitting   100    02/05/20 2025   --   --   96   --   146/94   --   --    02/05/20 1925   --   --   91   --   146/92   --   --    02/05/20 1825   --   --   97   --   150/100   --   --    02/05/20 1812   --   --   --   15   --   --   100    02/05/20 1732   --   --   --   16   --   --   100    02/05/20 1725   --   --   86   --   (!) 153/101   --   --    02/05/20 1700   --   --   --   22   --   --   --    02/05/20 1630   --   --   --   20   --   --   100    02/05/20 1625   --   --   83   --   154/96   --   --    02/05/20 1525   98.7 (37.1)   Oral   79   --   147/96   Lying   97    02/05/20 1522   --   --   --   20   --   --   --    02/05/20 1447   --   --   83   --   134/98   --   --    02/05/20 1445   --   --   83   19   134/98   --   100    02/05/20 1417   --   --   91   --   (!) 150/101   Lying   --    02/05/20 1414   --   --   --   19   --   --   99    02/05/20 1404   --   --   --   --   --   --   98    02/05/20 1347   --   --   90   18   137/87   --   --    02/05/20 1340   --   --   --   18   --   --   --    02/05/20 1315   --   --   85   --    140/92   Lying   --    02/05/20 1313   --   --   --   16   --   --   98    02/05/20 1259   --   --   82   14   (!) 151/101   Lying   100    02/05/20 1245   --   --   81   13   145/100   Lying   100    02/05/20 1233   --   --   76   19   (!) 151/101   Lying   99    02/05/20 1214   98.3 (36.8)   Oral   --   --   --   --   --    02/05/20 1212   --   --   80   14   --   --   100    02/05/20 1209   --   --   --   --   145/97   Lying   --    02/05/20 1145   --   --   80   18   (!) 140/101   --   96    02/05/20 1140   --   --   74   16   (!) 140/101   --   97    02/05/20 1135   --   --   80   20   139/99   --   97    02/05/20 1130   --   --   87   16   139/100   --   98    02/05/20 1125   --   --   81   20   (!) 136/101   --   97    02/05/20 1120   --   --   93   18   139/99   --   98    02/05/20 1114   --   --   88   18   (!) 137/108   --   96    02/05/20 1111   --   --   92   16   --   Lying   98    02/05/20 0957   --   --   82   16   --   Lying   98    02/05/20 0809   --   --   84   --   (!) 149/103   --   --    02/05/20 0711   98.8 (37.1)   Oral   97   18   142/91   Sitting   98    02/05/20 0534   98.8 (37.1)   Oral   89   18   147/93   Lying   98    02/05/20 0009   99.2 (37.3)   Oral   87   18   154/97   Lying   97    02/04/20 2224   --   --   95   --   145/91   --   --    02/04/20 2124   --   --   82   --   (!) 174/116   --   --    02/04/20 2123   --   --   82   --   (!) 174/116   --   --    02/04/20 1924   98.8 (37.1)   Oral   106   18   149/99   Sitting   99    02/04/20 1607   99 (37.2)   Oral   108   18   148/100   Lying   98    02/04/20 1606   100.5 (38.1)   Oral   --   --   --   --   --    02/04/20 1103   98.5 (36.9)   Oral   89   18   133/81   Lying   --    02/04/20 0838   99.1 (37.3)   Oral   --   --   --   --   --    02/04/20 0714   100 (37.8)   Oral   107   18   146/98   Sitting   97              Prior to Admission Medications     Prescriptions Last Dose Informant Patient Reported? Taking?    Prenatal Vit-Fe  Fumarate-FA (PRENATAL VITAMIN) 27-0.8 MG tablet 1/28/2020  Yes Yes    Take  by mouth.    aspirin 81 MG tablet Past Week  No Yes    Take 1 tablet by mouth Daily.    [facility] ASPIRIN ADULT LOW DOSE 81 MG chewable tablet Past Week  Yes Yes    famotidine (PEPCID) 20 MG tablet More than a month  Yes No    Take 20 mg by mouth 2 (Two) Times a Day.    ferrous sulfate 325 (65 FE) MG tablet 1/28/2020  No Yes    Take 1 tablet by mouth Daily With Breakfast.    ondansetron (ZOFRAN) 4 MG tablet More than a month  No No    Take 1 tablet by mouth Daily As Needed for Nausea or Vomiting.    pseudoephedrine-guaifenesin (MUCINEX D)  MG per 12 hr tablet More than a month  No No    Take 1 tablet by mouth Every 12 (Twelve) Hours.          No current facility-administered medications for this encounter.      Current Outpatient Medications   Medication Sig Dispense Refill   • Prenatal Vit-Fe Fumarate-FA (PRENATAL VITAMIN) 27-0.8 MG tablet Take  by mouth.     • docusate sodium 100 MG capsule Take 100 mg by mouth 2 (Two) Times a Day As Needed for Constipation. 30 each 0   • [START ON 2/8/2020] ferrous gluconate 324 (37.5 Fe) MG tablet tablet Take 1 tablet by mouth Daily With Breakfast. 100 tablet 0   • labetalol (NORMODYNE) 200 MG tablet Take 1 tablet by mouth Every 12 (Twelve) Hours. 90 tablet 2   • levoFLOXacin (LEVAQUIN) 500 MG tablet Take 1 tablet by mouth Daily for 8 doses. Indications: hematoma 8 tablet 0   • metroNIDAZOLE (FLAGYL) 500 MG tablet Take 1 tablet by mouth Every 12 (Twelve) Hours for 16 doses. Indications: hematoma 16 tablet 0   • oxyCODONE-acetaminophen (PERCOCET) 5-325 MG per tablet Take 2 tablets by mouth Every 4 (Four) Hours As Needed for Severe Pain  for up to 10 days. 30 tablet 0     Medication Administration Report for Angela Clemente as of 02/07/20 1528   Legend:    Given Hold Not Given Due Canceled Entry Other Actions    Time Time (Time) Time  Time-Action       Discontinued   Completed   Future   MAR Hold    Linked         Medications 02/05/20 02/06/20 02/07/20   Discontinued Medications    bupivacaine (PF) (MARCAINE) 0.5 % 0.15 %, SUFentanil (SUFENTA) 1 mcg/mL in sodium chloride 0.9 % 100 mL epidural  Rate: 12 mL/hr Dose: 12 mL/hr  Freq: Continuous Route: EP  Start: 01/29/20 0945   End: 01/29/20 1953   Admin Instructions: Anesthesiologist To Adjust Rate As Needed.  SUFentanil 1 mcg/mL added by anesthesia.    Order specific questions:  PCA Dose (mL) 0  Set delay (minutes) 0  Maximum PCA doses/hour 0  Loading dose per anesthesia (mL) 0            bupivacaine (PF) (MARCAINE) 0.5 % 0.15 %, SUFentanil (SUFENTA) 1 mcg/mL in sodium chloride 0.9 % 100 mL epidural  Rate: 12 mL/hr Dose: 12 mL/hr  Freq: Continuous Route: EP  Start: 01/29/20 0945   End: 01/29/20 1953   Admin Instructions: Anesthesiologist To Adjust Rate As Needed.  SUFentanil 1 mcg/mL added by anesthesia.    Order specific questions:  PCA Dose (mL) 0  Set delay (minutes) 0  Maximum PCA doses/hour 0  Loading dose per anesthesia (mL) 0            lactated ringers infusion  Rate: 100 mL/hr Dose: 100 mL/hr  Freq: Continuous Route: IV  Start: 02/05/20 1215   End: 02/06/20 1933    1234     1345              lactated ringers infusion  Rate: 9 mL/hr Dose: 9 mL/hr  Freq: Continuous PRN Route: IV  PRN Comment: Start Prior to Surgery  Start: 02/05/20 1002   End: 02/05/20 1212    1020     1026     1109             lactated ringers infusion  Rate: 75 mL/hr Dose: 75 mL/hr  Freq: Continuous Route: IV  Start: 02/02/20 1130   End: 02/06/20 1933          lactated ringers infusion  Rate: 100 mL/hr Dose: 100 mL/hr  Freq: Continuous Route: IV  Start: 01/29/20 1900   End: 01/29/20 2042          lactated ringers infusion  Rate: 150 mL/hr Dose: 150 mL/hr  Freq: Continuous Route: IV  Start: 01/29/20 1630   End: 02/02/20 1030          lactated ringers infusion  Rate: 125 mL/hr Dose: 125 mL/hr  Freq: Continuous Route: IV  Start: 01/29/20 0600   End: 01/29/20 2042          magnesium sulfate bolus from  bag 0.04 g/mL 6 g  Dose: 6 g  Freq: Once Route: IV  Start: 02/01/20 1845   End: 02/01/20 1835   Admin Instructions: Bolus magnesium over 20-30 minutes from the 40 g/1000 mL bag.          Followed by  magnesium sulfate 20 GM/500ML infusion  Rate: 50 mL/hr Dose: 2 g/hr  Freq: Continuous Route: IV  Start: 02/01/20 1845   End: 02/04/20 1838          naloxone (NARCAN) 0.4 mg in lactated ringers 1,000 mL infusion  Rate: 75 mL/hr Dose: 0.4 mg  Freq: Continuous PRN Route: IV  PRN Comment: itching  Start: 01/29/20 1951   End: 01/30/20 1950          oxytocin (PITOCIN) 30 units in 0.9% sodium chloride 500 mL (premix)  Rate: 2-20 mL/hr Dose: 2-20 alexandra-units/min  Freq: Titrated Route: IV  Start: 01/29/20 0600   End: 01/29/20 1509   Admin Instructions: Start at 2 alexandra units/min and increase by 2 alexandra-units every 30 min to a max of 20 alexandra-units/min. Titrate to maternal / fetal tolerance, with a goal of achieving an adequate labor pattern defined by progressive cervical effacement and cervical dilation of approximately 0.5 cm/hr to 1 cm/hr once active labor is achieved. Contractions should not be more frequent than every 2 minutes. Notify physician for tachysystole           sodium chloride 0.9 % infusion  Rate: 50 mL/hr Dose: 50 mL/hr  Freq: Continuous Route: IV  Start: 01/30/20 0644   End: 02/05/20 1217   Admin Instructions: To run with blood products                   Blood Administration Record (From admission, onward)    Completed transfusions     Ordered     Start    01/30/20 0635  Transfuse Fresh Frozen Plasma, 2 Units  Transfusion     Released Time Blood Unit Number Status   01/30/20 1408   19  [id number]  [id number] Completed 01/30/20 1551   01/30/20 1409   19  [id number]  [id number] Completed 01/30/20 1712       01/30/20 0633    01/30/20 0635  Transfuse RBC, 2 Units Infuse Each Unit Over: 3.5H  Transfusion     Released Time Blood Unit Number Status   01/30/20 0658   19  [id number]  [id number] Completed 01/30/20  1110   01/30/20 1044   20  [id number]  [id number] Completed 01/30/20 1406       01/30/20 0633          Canceled transfusions     Ordered     Start    01/29/20 1559  Transfuse Fresh Frozen Plasma, 2 Units  Transfusion,   Status:  Canceled     Released Time Blood Unit Number Status   01/29/20 1656 Not assigned Ordered (Discontinued)   01/29/20 1657 Not assigned Ordered (Discontinued)       01/29/20 1559    01/29/20 1559  Transfuse RBC, 2 Units Infuse Each Unit Over: 3.5H  Transfusion,   Status:  Canceled     Released Time Blood Unit Number Status   01/29/20 1601 Not assigned Ordered (Discontinued)   01/29/20 1657 Not assigned Ordered (Discontinued)       01/29/20 1558                Lab Results (last 72 hours)     Procedure Component Value Units Date/Time    Urine Culture - Urine, Urine, Clean Catch [[id number]]  (Normal) Collected:  02/03/20 1134    Specimen:  Urine, Clean Catch Updated:  02/05/20 1000     Urine Culture No growth    CBC & Differential [[id number]] Collected:  02/05/20 0539    Specimen:  Blood Updated:  02/05/20 0617    Narrative:       The following orders were created for panel order CBC & Differential.  Procedure                               Abnormality         Status                     ---------                               -----------         ------                     CBC Auto Differential[[id number]]        Abnormal            Final result                 Please view results for these tests on the individual orders.    CBC Auto Differential [[id number]]  (Abnormal) Collected:  02/05/20 0539    Specimen:  Blood Updated:  02/05/20 0617     WBC 6.37 10*3/mm3      RBC 2.75 10*6/mm3      Hemoglobin 8.4 g/dL      Hematocrit 25.3 %      MCV 92.0 fL      MCH 30.5 pg      MCHC 33.2 g/dL      RDW 14.6 %      RDW-SD 48.0 fl      MPV 9.7 fL      Platelets 307 10*3/mm3      Neutrophil % 72.0 %      Lymphocyte % 16.6 %      Monocyte % 8.9 %      Eosinophil % 1.6 %      Basophil % 0.3 %      Immature Grans %  0.6 %      Neutrophils, Absolute 4.58 10*3/mm3      Lymphocytes, Absolute 1.06 10*3/mm3      Monocytes, Absolute 0.57 10*3/mm3      Eosinophils, Absolute 0.10 10*3/mm3      Basophils, Absolute 0.02 10*3/mm3      Immature Grans, Absolute 0.04 10*3/mm3      nRBC 0.0 /100 WBC         Orders (last 72 hrs)      Start     Ordered    20 0000  ferrous gluconate 324 (37.5 Fe) MG tablet tablet  Daily With Breakfast      20 1037    20 1029  Discontinue IV  Once,   Status:  Canceled      20 1037    20 1024  Discharge patient  Once      20 1037    20 0000  labetalol (NORMODYNE) 200 MG tablet  Every 12 Hours Scheduled      20 1037    20 0000  docusate sodium 100 MG capsule  2 Times Daily PRN      20 1037    20 0000  metroNIDAZOLE (FLAGYL) 500 MG tablet  Every 12 Hours      20 1037    20 0000  Discharge Follow-up with Specified Provider: any provider      20 1037    20 0000  Discharge Instructions     Comments:  1. No driving for 2 wks  2. call for temp>101, heavy vaginal bleeding or increasing lower abdominal pain.  3. Continue prenatal vits for AT LEAST 6 weeks or as long as you breastfeed.    4. NOTHING IN THE VAGINA for 6 weeks.    5. For  sections, no heavy lifting for 6 weeks.   6. Bath or shower are fine.    7. Call for any concerns with postpartum depression.  8. Be considering what you will use for contraception in the future.    Our office:  [phone]    20 1037    20 0000  Activity as Tolerated      20 1037    20 0000  Pelvic Rest      20 1037    20 0000  Diet: Regular      20 1037    20 0000  No Dressing Needed      20 1037    20 0000  oxyCODONE-acetaminophen (PERCOCET) 5-325 MG per tablet  Every 4 Hours PRN      20 1037    20 0000  levoFLOXacin (LEVAQUIN) 500 MG tablet  Every 24 Hours      20 1037    20 1800  metroNIDAZOLE (FLAGYL)  tablet 500 mg  Every 12 Hours,   Status:  Discontinued      02/05/20 1435    02/05/20 1400  Incentive Spirometry  Every 2 Hours While Awake,   Status:  Canceled      02/05/20 1216    02/05/20 1315  metroNIDAZOLE (FLAGYL) tablet 500 mg  Every 12 Hours Scheduled,   Status:  Discontinued      02/05/20 1216    02/05/20 1315  levoFLOXacin (LEVAQUIN) tablet 500 mg  Every 24 Hours,   Status:  Discontinued      02/05/20 1216    02/05/20 1251  Notify Provider (Specify)  Until Discontinued,   Status:  Canceled      02/05/20 1250    02/05/20 1217  Code Status and Medical Interventions:  Continuous,   Status:  Canceled      02/05/20 1216    02/05/20 1217  Vital Signs Per Hospital Policy  Per Hospital Policy,   Status:  Canceled      02/05/20 1216    02/05/20 1217  Turn Cough Deep Breathe  Once,   Status:  Canceled      02/05/20 1216    02/05/20 1217  Advance Diet as Tolerated  Until Discontinued,   Status:  Canceled      02/05/20 1216    02/05/20 1217  Diet Regular  Diet Effective Now,   Status:  Canceled      02/05/20 1216    02/05/20 1216  Place Sequential Compression Device  Once      02/05/20 1216    02/05/20 1216  Maintain Sequential Compression Device  Continuous,   Status:  Canceled      02/05/20 1216    02/05/20 1215  lactated ringers infusion  Continuous,   Status:  Discontinued      02/05/20 1125    02/05/20 1159  Pulse Oximetry, Continuous  Continuous,   Status:  Canceled      02/05/20 1158    02/05/20 1159  Anesthesia Follow-up  Once     Provider:  (Not yet assigned)    02/05/20 1158    02/05/20 1126  Vital signs every 5 minutes for 15 minutes, every 15 minutes thereafter.  Once,   Status:  Canceled      02/05/20 1125    02/05/20 1126  Apply warming blanket  Once,   Status:  Canceled      02/05/20 1125    02/05/20 1126  Call Anesthesiologist for additional IV Fluid bolus for Hypotension/Tachycardia  Until Discontinued,   Status:  Canceled      02/05/20 1125    02/05/20 1126  Notify Anesthesia of Any Acute Changes in  Patient Condition  Until Discontinued,   Status:  Canceled      02/05/20 1125    02/05/20 1126  Notify Anesthesia for Unrelieved Pain  Until Discontinued,   Status:  Canceled      02/05/20 1125    02/05/20 1126  Once DC criteria to floor met, follow surgeon's orders.  Until Discontinued,   Status:  Canceled      02/05/20 1125    02/05/20 1126  Discharge patient from PACU when discharge criteria is met.  Until Discontinued,   Status:  Canceled      02/05/20 1125    02/05/20 1125  ondansetron (ZOFRAN) injection 4 mg  Once As Needed,   Status:  Discontinued      02/05/20 1125    02/05/20 1125  meperidine (DEMEROL) injection 12.5 mg  Every 5 Minutes PRN,   Status:  Discontinued      02/05/20 1125    02/05/20 1125  HYDROmorphone PF (DILAUDID) injection 0.5 mg  As Needed,   Status:  Discontinued      02/05/20 1125    02/05/20 1125  HYDROmorphone PF (DILAUDID) injection 1 mg  As Needed,   Status:  Discontinued      02/05/20 1125    02/05/20 1115  sodium chloride 0.9 % flush 3 mL  Every 12 Hours Scheduled,   Status:  Discontinued      02/05/20 1025    02/05/20 1058  bupivacaine (PF) (MARCAINE) 0.25 % injection  As Needed,   Status:  Discontinued      02/05/20 1058    02/05/20 1030  sterile water irrigation solution  As Needed,   Status:  Discontinued      02/05/20 1059    02/05/20 1026  Follow Anesthesia Guidelines / Protocol  Once,   Status:  Canceled      02/05/20 1025    02/05/20 1026  Insert Peripheral IV  Once,   Status:  Canceled      02/05/20 1025    02/05/20 1026  Saline Lock & Maintain IV Access  Continuous,   Status:  Canceled      02/05/20 1025    02/05/20 1026  Obtain informed consent  Once,   Status:  Canceled      02/05/20 1025    02/05/20 1026  Place sequential compression device  Once      02/05/20 1025    02/05/20 1025  sodium chloride 0.9 % flush 1-10 mL  As Needed,   Status:  Discontinued      02/05/20 1025    02/05/20 1025  sodium chloride 0.9 % infusion 40 mL  As Needed,   Status:  Discontinued       02/05/20 1025    02/05/20 1025  Verify / Perform Chlorhexidine Skin Prep  As Needed,   Status:  Canceled     Comments:  Chlorhexidine Skin wipes and instructions for all patients having a procedure requiring an outward incision if not allergic.  If allergic, give antibacterial skin wipes and instructions.  Do not use for facial cases or on any mucus membranes.    02/05/20 1025    02/05/20 1003  Apply warming blanket  Once,   Status:  Canceled      02/05/20 1003    02/05/20 1003  Oxygen Therapy- Nasal Cannula; Titrate for SPO2: equal to or greater than, 90%  Continuous,   Status:  Canceled      02/05/20 1003    02/05/20 1003  Pulse Oximetry, Continuous  Continuous,   Status:  Canceled      02/05/20 1003    02/05/20 1002  Midazolam HCl (PF) (VERSED) injection 1 mg  Every 5 Minutes PRN,   Status:  Discontinued      02/05/20 1003    02/05/20 1002  Midazolam HCl (PF) (VERSED) injection 2 mg  Every 5 Minutes PRN,   Status:  Discontinued      02/05/20 1003    02/05/20 1002  ondansetron (ZOFRAN) injection 4 mg  Once As Needed      02/05/20 1003    02/05/20 1002  lactated ringers infusion  Continuous PRN,   Status:  Discontinued      02/05/20 1003    02/05/20 1002  Vital Signs - Per Anesthesia Protocol  As Needed,   Status:  Canceled      02/05/20 1003    02/05/20 0800  ferrous gluconate tablet 324 mg  Daily With Breakfast,   Status:  Discontinued      02/04/20 1340    02/05/20 0600  CBC & Differential  Morning Draw      02/04/20 2212    02/05/20 0600  CBC Auto Differential  PROCEDURE ONCE      02/05/20 0003    02/05/20 0001  NPO Diet  Diet Effective Midnight,   Status:  Canceled      02/04/20 0937    02/03/20 2000  levoFLOXacin (LEVAQUIN) 500 mg/100 mL D5W (premix) (LEVAQUIN) 500 mg  Every 24 Hours,   Status:  Discontinued      02/03/20 1747 02/03/20 2000  metroNIDAZOLE (FLAGYL) 500 mg/100mL IVPB  Every 8 Hours,   Status:  Discontinued      02/03/20 1747 02/03/20 0915  lactobacillus acidophilus (RISAQUAD) capsule 1  capsule  Daily,   Status:  Discontinued      02/03/20 0816    02/03/20 0900  labetalol (NORMODYNE) tablet 200 mg  Every 12 Hours Scheduled,   Status:  Discontinued      02/03/20 0050    02/02/20 1130  lactated ringers infusion  Continuous,   Status:  Discontinued      02/02/20 1030    02/01/20 1845  magnesium sulfate 20 GM/500ML infusion  Continuous      02/01/20 1758    01/30/20 0800  ferrous gluconate tablet 324 mg  Daily With Breakfast,   Status:  Discontinued      01/29/20 1952 01/30/20 0729  witch hazel-glycerin (TUCKS) pad  As Needed,   Status:  Discontinued      01/30/20 0729    01/30/20 0644  sodium chloride 0.9 % infusion  Continuous,   Status:  Discontinued      01/30/20 0644    01/30/20 0600  Bladder Scan if Patient Unable to Void 4-6 Hours After Catheter Removal  As Needed,   Status:  Canceled      01/29/20 1952 01/29/20 2100  famotidine (PEPCID) tablet 20 mg  2 Times Daily,   Status:  Discontinued      01/29/20 1952 01/29/20 1952  Straight Cath Every 4-6 Hours As Needed If Patient is Unable to Void After 4-6 Hours, Bladder Scan Volume is Greater Than 500mL & Patient Has Symptoms of Bladder Discomfort / Distention  As Needed,   Status:  Canceled      01/29/20 1952 01/29/20 1952  Consult Pharmacist For Review of Medications That May Cause Urinary Retention - RN To Place Order for Consult it Needed  As Needed,   Status:  Canceled      01/29/20 1952 01/29/20 1952  Schedule / Prompt Voiding For Patients With Urinary Incontinence  As Needed,   Status:  Canceled      01/29/20 1952 01/29/20 1952  oxyCODONE-acetaminophen (PERCOCET) 5-325 MG per tablet 1 tablet  Every 4 Hours PRN,   Status:  Discontinued      01/29/20 1952 01/29/20 1952  oxyCODONE-acetaminophen (PERCOCET)  MG per tablet 1 tablet  Every 4 Hours PRN,   Status:  Discontinued      01/29/20 1952 01/29/20 1952  simethicone (MYLICON) chewable tablet 80 mg  4 Times Daily PRN,   Status:  Discontinued      01/29/20 1952     01/29/20 1952  docusate sodium (COLACE) capsule 100 mg  2 Times Daily PRN,   Status:  Discontinued      01/29/20 1952 01/29/20 1951  diphenhydrAMINE (BENADRYL) capsule 25 mg  Every 4 Hours PRN,   Status:  Discontinued      01/29/20 1951 01/29/20 1951  diphenhydrAMINE (BENADRYL) injection 25 mg  Every 4 Hours PRN,   Status:  Discontinued      01/29/20 1951 01/29/20 1951  diphenhydrAMINE (BENADRYL) injection 25 mg  Every 4 Hours PRN,   Status:  Discontinued      01/29/20 1951 01/29/20 1950  Blood Gas, Arterial  As Needed,   Status:  Canceled      01/29/20 1950                     Operative/Procedure Notes (last 72 hours) (Notes from 02/04/20 1529 through 02/07/20 1529)      Riana Murray DO at 02/05/20 1047          Subjective     Date of Service:  02/05/20  Time of Service:  11:21 AM    Surgical Staff: Surgeon(s) and Role:     * Riana Murray DO - Primary   Additional Staff: none   Pre-operative diagnosis(es): Pre-Op Diagnosis Codes:     * Hematoma [T14.8XXA]     Post-operative diagnosis(es): Post-Op Diagnosis Codes:     * Hematoma [T14.8XXA]   Procedure(s): Procedure(s):  WOUND EXPLORATION TRUNK with evacutaion of hematoma     Antibiotics: Pt on Levo/Flagyl     Anesthesia: Type: Choice  ASA:  II     Objective      Operative findings: Old blood in subcutaneous area of entire length of wound.  Old blood was not organized into a clot.  The blood was noted to be liquid and easily expressed when the incision was opened.  The area of induration noted on the abdomen from inflammation.   Specimens removed: None   Fluid Intake: 100mL   Output: Documented Output  Est. Blood Loss 5mL      I/O this shift:  In: 100 [I.V.:100]  Out: 5 [Blood:5]     Blood products used: No   Drains: [REMOVED] Urethral Catheter Silicone 16 Fr. (Removed)       [REMOVED] Urethral Catheter Silicone 16 Fr. (Removed)   Daily Indications Selected surgeries ( tract, abdomen) 1/30/2020  7:00 AM   Site Assessment Clean;Skin intact  2020  7:00 AM   Collection Container Standard drainage bag 2020  7:00 AM   Securement Method Securing device 2020  7:00 AM   Catheter care complete Yes 2020  7:00 AM   Output (mL) 1000 mL 2020  5:30 PM       [REMOVED] Urethral Catheter Silicone 16 Fr. (Removed)   Daily Indications Selected surgeries ( tract, abdomen) 2020  4:05 PM   Site Assessment Clean;Skin intact 2020  4:05 PM   Collection Container Standard drainage bag 2020  4:05 PM   Securement Method Securing device 2020  4:05 PM   Catheter care complete Yes 2020  4:05 PM   Output (mL) 350 mL 2020  2:05 PM      Implant Information: Nothing was implanted during the procedure   Complications: None apparent   Intraoperative consult(s):    Condition: stable   Disposition: to PACU and then admit to  PP floor         Assessment/Plan     19-year-old -1-0-2 status post  complicated by uterine rupture and subsequent exploratory laparotomy with supracervical hysterectomy.  Patient's postoperative course has been complicated by 6 units of packed red blood cells and 2 units of FFP.  Additionally, patient has had severe preeclampsia and is status post magnesium sulfate for 24 hours and is currently on labetalol 100 mg p.o. twice daily.  Patient diagnosed several days ago with 2 large hematomas.  When hematoma is in her abdominal cavity measuring approximately 14 cm and another hematoma was noted to be in the subcutaneous area measuring approximately 12 cm.  Patient has done well on IV antibiotics and is afebrile.  Her white blood cell count is normal at 6K.  The hematoma in the subcutaneous area is very firm to palpation and although stable in size is not improving after several days on antibiotics.  Discussed with patient that we would open up the skin incision and remove the hematoma in the subcutaneous area only.  Patient is not having any pain and states she is not febrile will not explore intra-abdominally.   Plan reviewed with the patient and her  via .  After all questions were answered, the patient was taken to the operating room.  Placed under general anesthesia.  An LMA was placed.  The patient remained in supine position and was prepped and draped in normal sterile fashion.  Patient's been receiving Levaquin and Flagyl on a regular basis and therefore no additional IV antibiotics were administered.  Patient had SCDs on the lower extremities bilaterally.  Using a scalpel the previous exploratory laparotomy incision was incised.  Once it was incised a couple centimeters a copious amount of old blood was expressed.  The blood was of liquid form and not in an organized clot.  The rest of the incision easily opened with manual dissection.  The blood was filling the entire length and depth of the incision.  Once the blood was removed the subcutaneous tissue was noted to be healthy.  The fascia was intact.  The areas of induration which is previously palpated on the abdomen was noted to be inflammation.  The area was irrigated.  The subcutaneous tissue was oozing from multiple areas.  Juan Jose was applied to the subcutaneous area which aided in hemostasis.  At this point the procedure was deemed over and the skin was reapproximated with staples.  Patient tolerated the procedure well.  There were no apparent complications.  Patient will return to the postpartum floor.  She will be started on oral antibiotics and IV antibiotics will be discontinued.  Patient is currently in stable condition.    Date of Discharge:  2020    Discharge Diagnosis:   S/p successful , pp hemorrhage, pp supracervical hysterectomy, drainage of postop extrafascial hematoma.      Problem List:    Postpartum hemorrhage    Uses Faroese as primary spoken language    Desires  (vaginal birth after ) trial    Uterine rupture during labor    S/P abdominal supracervical subtotal hysterectomy     Hematoma      Presenting Problem/History of Present Illness  Desires  (vaginal birth after ) trial [O34.219]    Pt is desiring to go home.  Pt is postop day #9 from pp supracervical hysterectomy.  Pt has no major complaints.  Tolerating diet well.  Ambulating well.       ROS:   Review of Systems   Constitutional: Negative.    Respiratory: Negative.    Cardiovascular: Negative.    Gastrointestinal: Negative.    Genitourinary: Negative.    Neurological: Negative.    Psychiatric/Behavioral: Negative.        Hospital Course  Patient is a 19 y.o. female presented with desire for TOLAC.  Pt delivered vaginally and developed a pp hemorrhage that was attributed to a uterine rupture.  Pt underwent a supracervical hysterectomy with blood loss of around 2500cc.  Postop course was eventful for development of postop hematomas.  Pt underwent a I&D of an incisional hematoma and recovered well.  Pt was experience normal bowel function, bladder function and tolerating diet well the day of discharge.  Pt was discharged home with instructions and precuations.       Procedures Performed  Procedure(s):  WOUND EXPLORATION TRUNK with evacutaion of hematoma     Vaginal, Spontaneous     Condition on Discharge:  satis    Vital Signs  Temp:  [98.3 °F (36.8 °C)-98.8 °F (37.1 °C)] 98.8 °F (37.1 °C)  Heart Rate:  [] 79  Resp:  [18] 18  BP: (126-143)/(84-99) 130/87    Physical Exam:  Physical Exam   Constitutional: She is oriented to person, place, and time. She appears well-developed and well-nourished.   HENT:   Head: Normocephalic and atraumatic.   Eyes: EOM are normal.   Neck: Normal range of motion.   Pulmonary/Chest: Effort normal.   Abdominal: Soft. Bowel sounds are normal. She exhibits no distension and no mass. There is no tenderness. There is no rebound and no guarding. No hernia.   Incision clean dry and intact, staples left in for removal next week.  Minor ecchymosis.  Some induration.     Musculoskeletal: Normal  range of motion.   Neurological: She is alert and oriented to person, place, and time.   Skin: Skin is warm and dry.   Psychiatric: She has a normal mood and affect. Her behavior is normal. Judgment and thought content normal.   Nursing note and vitals reviewed.        MEDICAL DECISION MAKING:     Discharge Disposition  Home or Self Care    Discharge Medications     Discharge Medications      New Medications      Instructions Start Date   docusate sodium 100 MG capsule   100 mg, Oral, 2 Times Daily PRN      ferrous gluconate 324 (37.5 Fe) MG tablet tablet  Replaces:  ferrous sulfate 325 (65 FE) MG tablet   324 mg, Oral, Daily With Breakfast   Start Date:  February 8, 2020     labetalol 200 MG tablet  Commonly known as:  NORMODYNE   200 mg, Oral, Every 12 Hours Scheduled      levoFLOXacin 500 MG tablet  Commonly known as:  LEVAQUIN   500 mg, Oral, Every 24 Hours      metroNIDAZOLE 500 MG tablet  Commonly known as:  FLAGYL   500 mg, Oral, Every 12 Hours      oxyCODONE-acetaminophen 5-325 MG per tablet  Commonly known as:  PERCOCET   2 tablets, Oral, Every 4 Hours PRN         Continue These Medications      Instructions Start Date   Prenatal Vitamin 27-0.8 MG tablet   Oral         Stop These Medications    aspirin 81 MG tablet     [facility] ASPIRIN ADULT LOW DOSE 81 MG chewable tablet  Generic drug:  aspirin     famotidine 20 MG tablet  Commonly known as:  PEPCID     ferrous sulfate 325 (65 FE) MG tablet  Replaced by:  ferrous gluconate 324 (37.5 Fe) MG tablet tablet     ondansetron 4 MG tablet  Commonly known as:  ZOFRAN     pseudoephedrine-guaifenesin  MG per 12 hr tablet  Commonly known as:  MUCINEX D            Discharge Diet   Diet Instructions     Diet: Regular      Discharge Diet:  Regular          Activity at Discharge  Activity Instructions     Activity as Tolerated      Pelvic Rest            Follow-up Appointments  Future Appointments   Date Time Provider Department Center   2/10/2020 11:30 AM [place]  Enriqueta Almonte MD [facility] None     Additional Instructions for the Follow-ups that You Need to Schedule     Discharge Follow-up with Specified Provider: any provider   As directed      To:  any provider    Follow Up Details:  bp check and staple removal               Time: More than 50% of time spent in counseling and/or coordination of care:  Total face-to-face/floor time 45 min.  Time spent in counseling 30 min. Counseling included the following topics with prognosis, differential diagnosis, risks, benefits of treatment, instructions, compliance and/or risk reduction and alternatives: postop and postpartum care.  Postpartum depression.  Wound care and need for staple removal soon.         Tomi Cooney MD  10:37 AM  2/7/2020        Electronically signed by Tomi Cooney MD at 02/07/20 1046

## 2020-02-10 ENCOUNTER — POSTPARTUM VISIT (OUTPATIENT)
Dept: OBSTETRICS AND GYNECOLOGY | Facility: CLINIC | Age: 20
End: 2020-02-10

## 2020-02-10 ENCOUNTER — TELEPHONE (OUTPATIENT)
Dept: OBSTETRICS AND GYNECOLOGY | Facility: HOSPITAL | Age: 20
End: 2020-02-10

## 2020-02-10 VITALS
DIASTOLIC BLOOD PRESSURE: 82 MMHG | SYSTOLIC BLOOD PRESSURE: 120 MMHG | HEIGHT: 61 IN | BODY MASS INDEX: 24.37 KG/M2 | WEIGHT: 129.1 LBS

## 2020-02-10 DIAGNOSIS — T14.8XXA HEMATOMA: ICD-10-CM

## 2020-02-10 DIAGNOSIS — Z90.711 S/P ABDOMINAL SUPRACERVICAL SUBTOTAL HYSTERECTOMY: ICD-10-CM

## 2020-02-10 DIAGNOSIS — Z13.9 SCREENING FOR CONDITION: Primary | ICD-10-CM

## 2020-02-10 LAB
BILIRUB BLD-MCNC: NEGATIVE MG/DL
CLARITY, POC: CLEAR
COLOR UR: ABNORMAL
GLUCOSE UR STRIP-MCNC: NEGATIVE MG/DL
KETONES UR QL: NEGATIVE
LEUKOCYTE EST, POC: ABNORMAL
NITRITE UR-MCNC: NEGATIVE MG/ML
PH UR: 5 [PH] (ref 5–8)
PROT UR STRIP-MCNC: ABNORMAL MG/DL
RBC # UR STRIP: ABNORMAL /UL
SP GR UR: 1.01 (ref 1–1.03)
UROBILINOGEN UR QL: NORMAL

## 2020-02-10 PROCEDURE — 99024 POSTOP FOLLOW-UP VISIT: CPT | Performed by: OBSTETRICS & GYNECOLOGY

## 2020-02-10 NOTE — PROGRESS NOTES
"      Angela Clemente is a 19 y.o. patient who presents for follow up of   Chief Complaint   Patient presents with   • Postpartum Care     Stitch Removal      19-year-old established patient here for staple removal.  She had a uterine rupture after  and had a supracervical hysterectomy.  She had a prolonged hospitalization which included severe preeclampsia, wound hematoma and pelvic hematoma.  On 2020 she underwent wound exploration with evacuation of superficial hematoma.  She states she is not having any pain.  She is taking her blood pressure medicines and has not noticed any headache or visual changes.  She denies any fevers or chills.        The following portions of the patient's history were reviewed and updated as appropriate: allergies, current medications and problem list.    Review of Systems   Constitutional: Positive for activity change and fatigue. Negative for appetite change, fever and unexpected weight change.   Eyes: Negative for photophobia and visual disturbance.   Respiratory: Negative for cough and shortness of breath.    Cardiovascular: Negative for chest pain and palpitations.   Gastrointestinal: Negative for abdominal distention, abdominal pain, constipation, diarrhea and nausea.   Endocrine: Negative for cold intolerance and heat intolerance.   Genitourinary: Negative for dyspareunia, dysuria, menstrual problem, pelvic pain, vaginal bleeding and vaginal discharge.   Musculoskeletal: Negative for back pain.   Skin: Negative for color change and rash.   Neurological: Negative for headaches.   Hematological: Negative for adenopathy. Does not bruise/bleed easily.   Psychiatric/Behavioral: Positive for sleep disturbance. Negative for dysphoric mood. The patient is not nervous/anxious.        /82   Ht 155.9 cm (61.38\")   Wt 58.6 kg (129 lb 1.6 oz)   LMP 2019 (Exact Date)   Breastfeeding No   BMI 24.09 kg/m²     Physical Exam   Constitutional: She is oriented to person, " place, and time. She appears well-developed and well-nourished.   HENT:   Head: Normocephalic and atraumatic.   Eyes: Conjunctivae are normal. No scleral icterus.   Neck: Neck supple. No thyromegaly present.   Abdominal: Soft. She exhibits distension. She exhibits no mass. There is no tenderness. There is no rebound and no guarding. No hernia.       Neurological: She is alert and oriented to person, place, and time.   Skin: Skin is warm and dry.   Psychiatric: She has a normal mood and affect. Her behavior is normal. Judgment and thought content normal.   Nursing note and vitals reviewed.      A/P:  1. Postop wound exploration evacuation of hematoma- incision examined.  Staples removed.  Patient still has a large area of presumed hematoma on the right side.  This is decreased from her exam before her wound evacuation, however, it is still present.  No evidence of active bleeding or infection.  We will see weekly. Pt to finish her levaquin and Flagyl as ordered.  2. Severe preeclampsia- normotensive on labetalol 200 mg twice daily.  3. S/p BERNY- will need pap 7/2020   4. H/O pp hemorrhage- anemia on iron supplementation.   5. RTO 1 week recheck incision.    Assessment/Plan   Angela was seen today for postpartum care.    Diagnoses and all orders for this visit:    Screening for condition  -     POC Urinalysis Dipstick    Uterine rupture during labor    Other immediate postpartum hemorrhage    S/P abdominal supracervical subtotal hysterectomy    Hematoma                 No follow-ups on file.      Enriqueta Ybarra MD    2/10/2020  12:51 PM

## 2020-02-10 NOTE — TELEPHONE ENCOUNTER
Breast Feeding Follow Phone Call PP Week : 1    Date of Birth:[birth year]  Baby's Name: Jefry              PI ID # [id number]    Ms Clemente is currently breast feeding. She is at a doctors appointment and cannot talk at length right now.

## 2020-02-24 ENCOUNTER — POSTPARTUM VISIT (OUTPATIENT)
Dept: OBSTETRICS AND GYNECOLOGY | Facility: CLINIC | Age: 20
End: 2020-02-24

## 2020-02-24 VITALS
SYSTOLIC BLOOD PRESSURE: 122 MMHG | HEIGHT: 61 IN | WEIGHT: 122.7 LBS | BODY MASS INDEX: 23.17 KG/M2 | DIASTOLIC BLOOD PRESSURE: 88 MMHG

## 2020-02-24 DIAGNOSIS — Z90.711 S/P ABDOMINAL SUPRACERVICAL SUBTOTAL HYSTERECTOMY: ICD-10-CM

## 2020-02-24 DIAGNOSIS — O92.79 INSUFFICIENT LACTATION: ICD-10-CM

## 2020-02-24 DIAGNOSIS — T14.8XXA HEMATOMA: Primary | ICD-10-CM

## 2020-02-24 PROCEDURE — 0503F POSTPARTUM CARE VISIT: CPT | Performed by: OBSTETRICS & GYNECOLOGY

## 2020-02-24 RX ORDER — SERTRALINE HYDROCHLORIDE 25 MG/1
25 TABLET, FILM COATED ORAL DAILY
Qty: 30 TABLET | Refills: 2 | Status: SHIPPED | OUTPATIENT
Start: 2020-02-24

## 2020-02-26 ENCOUNTER — TELEPHONE (OUTPATIENT)
Dept: OBSTETRICS AND GYNECOLOGY | Facility: HOSPITAL | Age: 20
End: 2020-02-26

## 2020-02-26 NOTE — TELEPHONE ENCOUNTER
Breast Feeding Follow Phone Call PP Week :2    Date of Birth:[birth year]  Baby's Name: Jefry            PI ID# [id number]    's  answered. He states that she is not available and does not have her own phone number. He denies that she's having any issues. States that baby got sick and has been in hospital for 15 days. I encouraged him to have her call the hospital if she has breastfeeding or pumping needs.

## 2020-03-09 NOTE — PROGRESS NOTES
"  Angela Clemente is a 19 y.o. patient who presents for follow up of   Chief Complaint   Patient presents with   • Postpartum Care     1 week incision check and her child is in [facility]        19-year-old established patient here for incision check. She had a uterine rupture after  and had a supracervical hysterectomy.  She had a prolonged hospitalization which included severe preeclampsia, wound hematoma and pelvic hematoma.  On 2020 she underwent wound exploration with evacuation of superficial hematoma.  She states she is not having any pain.  She is taking her blood pressure medicines and has not noticed any headache or visual changes.  She denies any fevers or chills.  She is feeling depressed.  We discussed medication and counseling and she would like to go ahead and start medication.  Her infant is currently in [place] for \"low calcium\".  She is stopped her antibiotics and her milk supply is dwindling.  She would like medicine to help improve her milk supply so we called in Reglan regimen for her.      The following portions of the patient's history were reviewed and updated as appropriate: allergies, current medications and problem list.    Review of Systems   Constitutional: Positive for activity change. Negative for appetite change, fatigue, fever and unexpected weight change.   Eyes: Negative for photophobia and visual disturbance.   Respiratory: Negative for cough and shortness of breath.    Cardiovascular: Negative for chest pain and palpitations.   Gastrointestinal: Negative for abdominal distention, abdominal pain, constipation, diarrhea and nausea.   Endocrine: Negative for cold intolerance and heat intolerance.   Genitourinary: Negative for dyspareunia, dysuria, menstrual problem, pelvic pain, vaginal bleeding and vaginal discharge.   Musculoskeletal: Negative for back pain.   Skin: Negative for color change and rash.   Neurological: Negative for headaches.   Hematological: Negative for " "adenopathy. Does not bruise/bleed easily.   Psychiatric/Behavioral: Positive for dysphoric mood and sleep disturbance. The patient is nervous/anxious.        /88   Ht 155.9 cm (61.38\")   Wt 55.7 kg (122 lb 11.2 oz)   LMP  (LMP Unknown)   Breastfeeding No   BMI 22.90 kg/m²     Physical Exam   Constitutional: She is oriented to person, place, and time. She appears well-developed and well-nourished.   HENT:   Head: Normocephalic and atraumatic.   Eyes: Conjunctivae are normal. No scleral icterus.   Neck: Neck supple. No thyromegaly present.   Abdominal: Soft. She exhibits no distension and no mass. There is no tenderness. There is no rebound and no guarding. No hernia.       Neurological: She is alert and oriented to person, place, and time.   Skin: Skin is warm and dry.   Psychiatric: She has a normal mood and affect. Her behavior is normal. Judgment and thought content normal.   Nursing note and vitals reviewed.      A/P:  1. S/P BERNY for uterine rupture and postpartum hemorrhage-hematoma has decreased in size significantly.  Her abdominal exam is trending toward normal.  2. Hematoma- resolving.   3. PP depression- start Zoloft 25 mg QD  4. Mild supply poor-called in Reglan regimen. Enc adequate fluid intake and frequent pumping.  5. Abnormal pap smear- 7/2019- ASCUS + HPV pap, needs pap pp  6 Anemia- on iron, check H&H next visit.   7. Preeclampsia- normotensive on labetalol  8. RTO 2 weeks for pp visit with Dr Murray.     Assessment/Plan   Angela was seen today for postpartum care.    Diagnoses and all orders for this visit:    Hematoma    S/P abdominal supracervical subtotal hysterectomy    Other immediate postpartum hemorrhage    Insufficient lactation    Postpartum depression    Other orders  -     sertraline (ZOLOFT) 25 MG tablet; Take 1 tablet by mouth Daily.                 No follow-ups on file.      Enriqueta Ybarra MD    2/24/2020  2:43 PM    "

## 2020-03-18 ENCOUNTER — TELEPHONE (OUTPATIENT)
Dept: OBSTETRICS AND GYNECOLOGY | Facility: HOSPITAL | Age: 20
End: 2020-03-18

## 2020-03-18 NOTE — TELEPHONE ENCOUNTER
Breast Feeding Follow Phone Call PP Week : 6    Date of Birth: [birth year] Baby's Name: Jefry    ID#[id number]    No answer

## (undated) DEVICE — DRSNG PAD ABD 8X10IN STRL

## (undated) DEVICE — 3M™ MEDIPORE™ H SOFT CLOTH SURGICAL TAPE 2864, 4 INCH X 10 YARD (10CM X 9,14M), 12 ROLLS/CASE: Brand: 3M™ MEDIPORE™

## (undated) DEVICE — PAD,ABDOMINAL,8"X7.5",STERILE,LF,1/PK: Brand: MEDLINE

## (undated) DEVICE — TBG INSUFL W FLTR STRL

## (undated) DEVICE — NDL HYPO SFTY GLD 22G 1 1/2IN

## (undated) DEVICE — DRSNG TELFA PAD NONADH STR 1S 3X8IN

## (undated) DEVICE — GLV SURG NEOLON 2G PF LF 6.5 STRL

## (undated) DEVICE — POOLE SUCTION INSTRUMENT WITH REMOVABLE SHEATH: Brand: POOLE

## (undated) DEVICE — Device: Brand: FOG GUARD ANTI-FOG SOLUTION WITH SPONGE

## (undated) DEVICE — APPL CHLORAPREP W/TINT 26ML ORNG

## (undated) DEVICE — SUT VIC 0 CT1 CR8 18IN J840D

## (undated) DEVICE — TRY SKINPREP DRYPREP

## (undated) DEVICE — SPNG GZ WOVN 4X4IN 12PLY 10/BX STRL

## (undated) DEVICE — SUT VIC 0 TIES 18IN J912G

## (undated) DEVICE — SOL ANTISEP SCRB PVPI 7.5PCT 4OZ

## (undated) DEVICE — PREP SOL POVIDONE/IODINE BT 4OZ

## (undated) DEVICE — SPNG LAP 18X18IN LF STRL PK/5

## (undated) DEVICE — GLV SURG SENSICARE MICRO PF LF 6 STRL

## (undated) DEVICE — UNDYED BRAIDED (POLYGLACTIN 910), SYNTHETIC ABSORBABLE SUTURE: Brand: COATED VICRYL

## (undated) DEVICE — PK PROC MAJ 90

## (undated) DEVICE — SYR CONTRL LUERLOK 10CC

## (undated) DEVICE — SUCTION CANISTER, 1000CC,SAFELINER: Brand: DEROYAL

## (undated) DEVICE — SUT VIC 0 CT1 36IN J946H

## (undated) DEVICE — PROXIMATE RH ROTATING HEAD SKIN STAPLERS (35 WIDE) CONTAINS 35 STAINLESS STEEL STAPLES: Brand: PROXIMATE

## (undated) DEVICE — COVER,MAYO STAND,STERILE: Brand: MEDLINE

## (undated) DEVICE — SOL PVPI ANTISEPT SCRB 4OZ

## (undated) DEVICE — ENDOPATH XCEL BLADELESS TROCARS WITH STABILITY SLEEVES: Brand: ENDOPATH XCEL

## (undated) DEVICE — LEGGINGS, PAIR, CLEAR, STERILE: Brand: MEDLINE